# Patient Record
Sex: FEMALE | Race: WHITE | NOT HISPANIC OR LATINO | Employment: FULL TIME | URBAN - METROPOLITAN AREA
[De-identification: names, ages, dates, MRNs, and addresses within clinical notes are randomized per-mention and may not be internally consistent; named-entity substitution may affect disease eponyms.]

---
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
## 2018-01-29 ENCOUNTER — INITIAL PRENATAL (OUTPATIENT)
Dept: OBSTETRICS AND GYNECOLOGY | Facility: CLINIC | Age: 18
End: 2018-01-29

## 2018-01-29 VITALS — WEIGHT: 153 LBS | SYSTOLIC BLOOD PRESSURE: 107 MMHG | DIASTOLIC BLOOD PRESSURE: 64 MMHG | BODY MASS INDEX: 27.1 KG/M2

## 2018-01-29 DIAGNOSIS — O09.299 HX OF PREECLAMPSIA, PRIOR PREGNANCY, CURRENTLY PREGNANT: Primary | ICD-10-CM

## 2018-01-29 DIAGNOSIS — Z34.90 EARLY STAGE OF PREGNANCY: ICD-10-CM

## 2018-01-29 DIAGNOSIS — Z87.59 HISTORY OF SHOULDER DYSTOCIA IN PRIOR PREGNANCY: ICD-10-CM

## 2018-01-29 PROBLEM — D68.51 HETEROZYGOUS FACTOR V LEIDEN AFFECTING PREGNANCY IN THIRD TRIMESTER, ANTEPARTUM (HCC): Status: RESOLVED | Noted: 2017-05-30 | Resolved: 2018-01-29

## 2018-01-29 PROBLEM — O99.330 TOBACCO USE AFFECTING PREGNANCY, ANTEPARTUM: Status: ACTIVE | Noted: 2018-01-29

## 2018-01-29 PROBLEM — L30.9 DERMATITIS: Status: RESOLVED | Noted: 2017-06-25 | Resolved: 2018-01-29

## 2018-01-29 PROBLEM — O99.113 HETEROZYGOUS FACTOR V LEIDEN AFFECTING PREGNANCY IN THIRD TRIMESTER, ANTEPARTUM (HCC): Status: RESOLVED | Noted: 2017-05-30 | Resolved: 2018-01-29

## 2018-01-29 LAB
ALBUMIN SERPL-MCNC: 4.1 G/DL (ref 3.5–5.2)
ALBUMIN/GLOB SERPL: 1.5 G/DL
ALP SERPL-CCNC: 67 U/L (ref 39–117)
ALT SERPL-CCNC: 19 U/L (ref 1–33)
AST SERPL-CCNC: 14 U/L (ref 1–32)
B-HCG UR QL: POSITIVE
BILIRUB SERPL-MCNC: 0.2 MG/DL (ref 0.1–1.2)
BUN SERPL-MCNC: 5 MG/DL (ref 6–20)
BUN/CREAT SERPL: 10.6 (ref 7–25)
CALCIUM SERPL-MCNC: 9.7 MG/DL (ref 8.6–10.5)
CHLORIDE SERPL-SCNC: 104 MMOL/L (ref 98–107)
CO2 SERPL-SCNC: 20 MMOL/L (ref 22–29)
CREAT SERPL-MCNC: 0.47 MG/DL (ref 0.57–1)
GFR SERPLBLD CREATININE-BSD FMLA CKD-EPI: >150 ML/MIN/1.73
GFR SERPLBLD CREATININE-BSD FMLA CKD-EPI: >150 ML/MIN/1.73
GLOBULIN SER CALC-MCNC: 2.7 GM/DL
GLUCOSE SERPL-MCNC: 92 MG/DL (ref 65–99)
INTERNAL NEGATIVE CONTROL: NEGATIVE
INTERNAL POSITIVE CONTROL: POSITIVE
Lab: ABNORMAL
POTASSIUM SERPL-SCNC: 4 MMOL/L (ref 3.5–5.2)
PROT SERPL-MCNC: 6.8 G/DL (ref 6–8.5)
SODIUM SERPL-SCNC: 139 MMOL/L (ref 136–145)

## 2018-01-29 PROCEDURE — 81025 URINE PREGNANCY TEST: CPT | Performed by: OBSTETRICS & GYNECOLOGY

## 2018-01-29 PROCEDURE — 99214 OFFICE O/P EST MOD 30 MIN: CPT | Performed by: OBSTETRICS & GYNECOLOGY

## 2018-01-29 RX ORDER — PRENATAL WITH FERROUS FUM AND FOLIC ACID 3080; 920; 120; 400; 22; 1.84; 3; 20; 10; 1; 12; 200; 27; 25; 2 [IU]/1; [IU]/1; MG/1; [IU]/1; MG/1; MG/1; MG/1; MG/1; MG/1; MG/1; UG/1; MG/1; MG/1; MG/1; MG/1
1 TABLET ORAL DAILY
Qty: 30 TABLET | Refills: 11 | Status: SHIPPED | OUTPATIENT
Start: 2018-01-29 | End: 2018-02-28

## 2018-01-29 RX ORDER — UREA 10 %
LOTION (ML) TOPICAL
COMMUNITY
Start: 2017-12-28 | End: 2018-02-26

## 2018-01-29 RX ORDER — DULOXETIN HYDROCHLORIDE 30 MG/1
CAPSULE, DELAYED RELEASE ORAL
COMMUNITY
Start: 2018-01-17 | End: 2018-02-26

## 2018-01-29 NOTE — PROGRESS NOTES
CC:  Initial OB visit  Patient presents today for initial OB visit.  Her last delivery was 7 months ago.  Patient was delivered at 38 weeks and was noted to have elevated blood pressures upon presentation to labor and delivery.  She was not diagnosed with preeclampsia during labor and was discharged home on postpartum day #2 on no medications.  Patient then re-presented one day later with pulmonary edema.  It was felt that this was related to postpartum preeclampsia.  An echocardiogram was normal and she was ruled out for postpartum cardiomyopathy.  Patient has a history of factor V Leiden mutation but has no history of a personal blood clot and is heterozygous for the gene.  Her last delivery was also complicated by a shoulder dystocia.  In terms of postpartum preeclampsia, discussed recommendations to start 81 mg of aspirin daily.  Will also obtain baseline labs and a baseline 24-hour urine protein.  Blood pressure today is normal.  As far as a previous history of shoulder dystocia, explained to patient that she is at increased risk for a recurrent shoulder dystocia with each pregnancy.  Discussed with her risks and benefits of trial of labor versus primary  section.  Patient states that she desires a primary  with this delivery.  Patient was not on any anticoagulation with her last pregnancy and per ACOG recommendations, anticoagulation is not required with heterozygous factor V Leiden mutation and no prior history of clot.  Patient is unsure regarding her LMP and a dating ultrasound has been ordered.  A/P:  Supervision of pregnancy with unknown LMP with h/o preeclampsia and shoulder dystocia  --OB labs, CMP, and 24 hour urine protein today  --Dating u/s ordered  --Start 81 mg ASA daily  --Followup in 4 weeks  Counseling was given to patient for the following topics: instructions for management, risk factor reductions, prognosis, patient and family education, risks and benefits of treatment  options and importance of treatment compliance . Total time of the encounter was 25 minutes and 15 minutes was spend counseling.

## 2018-01-30 ENCOUNTER — PROCEDURE VISIT (OUTPATIENT)
Dept: OBSTETRICS AND GYNECOLOGY | Facility: CLINIC | Age: 18
End: 2018-01-30

## 2018-01-30 DIAGNOSIS — Z36.89 ENCOUNTER TO ESTABLISH GESTATIONAL AGE USING ULTRASOUND: Primary | ICD-10-CM

## 2018-01-30 DIAGNOSIS — O99.330 TOBACCO USE AFFECTING PREGNANCY, ANTEPARTUM: ICD-10-CM

## 2018-01-30 DIAGNOSIS — D68.51 FACTOR 5 LEIDEN MUTATION, HETEROZYGOUS (HCC): ICD-10-CM

## 2018-01-30 LAB
ABO GROUP BLD: (no result)
BASOPHILS # BLD AUTO: 0 X10E3/UL (ref 0–0.2)
BASOPHILS NFR BLD AUTO: 0 %
BLD GP AB SCN SERPL QL: NEGATIVE
EOSINOPHIL # BLD AUTO: 0.2 X10E3/UL (ref 0–0.4)
EOSINOPHIL NFR BLD AUTO: 3 %
ERYTHROCYTE [DISTWIDTH] IN BLOOD BY AUTOMATED COUNT: 14.6 % (ref 12.3–15.4)
HBV SURFACE AG SERPL QL IA: NEGATIVE
HCT VFR BLD AUTO: 37.3 % (ref 34–46.6)
HGB BLD-MCNC: 12.5 G/DL (ref 11.1–15.9)
HIV 1+2 AB+HIV1 P24 AG SERPL QL IA: NON REACTIVE
IMM GRANULOCYTES # BLD: 0 X10E3/UL (ref 0–0.1)
IMM GRANULOCYTES NFR BLD: 0 %
LYMPHOCYTES # BLD AUTO: 1.7 X10E3/UL (ref 0.7–3.1)
LYMPHOCYTES NFR BLD AUTO: 27 %
MCH RBC QN AUTO: 27.5 PG (ref 26.6–33)
MCHC RBC AUTO-ENTMCNC: 33.5 G/DL (ref 31.5–35.7)
MCV RBC AUTO: 82 FL (ref 79–97)
MONOCYTES # BLD AUTO: 0.5 X10E3/UL (ref 0.1–0.9)
MONOCYTES NFR BLD AUTO: 7 %
NEUTROPHILS # BLD AUTO: 4.1 X10E3/UL (ref 1.4–7)
NEUTROPHILS NFR BLD AUTO: 63 %
PLATELET # BLD AUTO: 243 X10E3/UL (ref 150–379)
RBC # BLD AUTO: 4.54 X10E6/UL (ref 3.77–5.28)
RH BLD: POSITIVE
RPR SER QL: NON REACTIVE
RUBV IGG SERPL IA-ACNC: 1.34 INDEX
WBC # BLD AUTO: 6.5 X10E3/UL (ref 3.4–10.8)

## 2018-01-30 PROCEDURE — 76817 TRANSVAGINAL US OBSTETRIC: CPT | Performed by: OBSTETRICS & GYNECOLOGY

## 2018-01-31 LAB
BACTERIA UR CULT: NORMAL
BACTERIA UR CULT: NORMAL
C TRACH RRNA SPEC QL NAA+PROBE: NEGATIVE
N GONORRHOEA RRNA SPEC QL NAA+PROBE: NEGATIVE
T VAGINALIS RRNA SPEC QL NAA+PROBE: NEGATIVE

## 2018-02-26 ENCOUNTER — ROUTINE PRENATAL (OUTPATIENT)
Dept: OBSTETRICS AND GYNECOLOGY | Facility: CLINIC | Age: 18
End: 2018-02-26

## 2018-02-26 VITALS — SYSTOLIC BLOOD PRESSURE: 127 MMHG | DIASTOLIC BLOOD PRESSURE: 73 MMHG | WEIGHT: 149.2 LBS | BODY MASS INDEX: 26.43 KG/M2

## 2018-02-26 DIAGNOSIS — O26.892 HEADACHE IN PREGNANCY, ANTEPARTUM, SECOND TRIMESTER: Primary | ICD-10-CM

## 2018-02-26 DIAGNOSIS — Z3A.14 14 WEEKS GESTATION OF PREGNANCY: ICD-10-CM

## 2018-02-26 DIAGNOSIS — R51.9 HEADACHE IN PREGNANCY, ANTEPARTUM, SECOND TRIMESTER: Primary | ICD-10-CM

## 2018-02-26 PROCEDURE — 99213 OFFICE O/P EST LOW 20 MIN: CPT | Performed by: OBSTETRICS & GYNECOLOGY

## 2018-02-26 RX ORDER — MAGNESIUM OXIDE 400 MG/1
400 TABLET ORAL DAILY
Qty: 30 TABLET | Refills: 0 | Status: SHIPPED | OUTPATIENT
Start: 2018-02-26 | End: 2018-05-17

## 2018-03-05 LAB
2ND TRIMESTER 4 SCREEN SERPL-IMP: NORMAL
2ND TRIMESTER 4 SCREEN SERPL-IMP: NORMAL
AFP ADJ MOM SERPL: NORMAL
AFP SERPL-MCNC: 24.2 NG/ML
AGE AT DELIVERY: 20 YEARS
FET TS 18 RISK FROM MAT AGE: NORMAL
FET TS 21 RISK FROM MAT AGE: 1163
GA METHOD: NORMAL
GA: 14.1 WEEKS
HCG ADJ MOM SERPL: NORMAL
HCG SERPL-ACNC: NORMAL MIU/ML
IDDM PATIENT QL: NO
INHIBIN A ADJ MOM SERPL: NORMAL
INHIBIN A SERPL-MCNC: 134.46 PG/ML
LABORATORY COMMENT REPORT: NORMAL
MULTIPLE PREGNANCY: NORMAL
NEURAL TUBE DEFECT RISK FETUS: NORMAL %
RESULT: NORMAL
TS 18 RISK FETUS: NORMAL
TS 21 RISK FETUS: NORMAL
U ESTRIOL ADJ MOM SERPL: NORMAL
U ESTRIOL SERPL-MCNC: 0.4 NG/ML

## 2018-03-07 ENCOUNTER — TELEPHONE (OUTPATIENT)
Dept: OBSTETRICS AND GYNECOLOGY | Facility: CLINIC | Age: 18
End: 2018-03-07

## 2018-03-07 NOTE — TELEPHONE ENCOUNTER
----- Message from Radha Noel MD sent at 3/7/2018  4:34 PM EST -----  Let patient know that she had her blood draw for the genetic screen a little too early but she may now come back and have it repeated at any time or we can repeat it at her next visit.

## 2018-03-13 ENCOUNTER — TELEPHONE (OUTPATIENT)
Dept: OBSTETRICS AND GYNECOLOGY | Facility: CLINIC | Age: 18
End: 2018-03-13

## 2018-03-13 NOTE — TELEPHONE ENCOUNTER
Rx sent    ----- Message from Elisa Pena sent at 3/13/2018 10:31 AM EDT -----  Ms Major believes she has an yeast infection and is requesting RX, please advise.

## 2018-03-26 ENCOUNTER — ROUTINE PRENATAL (OUTPATIENT)
Dept: OBSTETRICS AND GYNECOLOGY | Facility: CLINIC | Age: 18
End: 2018-03-26

## 2018-03-26 VITALS — DIASTOLIC BLOOD PRESSURE: 70 MMHG | BODY MASS INDEX: 20.41 KG/M2 | WEIGHT: 115.2 LBS | SYSTOLIC BLOOD PRESSURE: 121 MMHG

## 2018-03-26 DIAGNOSIS — O99.330 TOBACCO USE AFFECTING PREGNANCY, ANTEPARTUM: Primary | ICD-10-CM

## 2018-03-26 DIAGNOSIS — Z3A.18 18 WEEKS GESTATION OF PREGNANCY: ICD-10-CM

## 2018-03-26 PROCEDURE — 99213 OFFICE O/P EST LOW 20 MIN: CPT | Performed by: OBSTETRICS & GYNECOLOGY
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
## 2018-03-28 LAB
2ND TRIMESTER 4 SCREEN SERPL-IMP: NORMAL
2ND TRIMESTER 4 SCREEN SERPL-IMP: NORMAL
AFP ADJ MOM SERPL: 1.19
AFP SERPL-MCNC: 50.7 NG/ML
AGE AT DELIVERY: 20 YEARS
FET TS 18 RISK FROM MAT AGE: NORMAL
FET TS 21 RISK FROM MAT AGE: 1163
GA METHOD: NORMAL
GA: 18.1 WEEKS
HCG ADJ MOM SERPL: 0.86
HCG SERPL-ACNC: NORMAL MIU/ML
IDDM PATIENT QL: NO
INHIBIN A ADJ MOM SERPL: 0.89
INHIBIN A SERPL-MCNC: 147.25 PG/ML
LABORATORY COMMENT REPORT: NORMAL
MULTIPLE PREGNANCY: NORMAL
NEURAL TUBE DEFECT RISK FETUS: 6704 %
RESULT: NORMAL
TS 18 RISK FETUS: NORMAL
TS 21 RISK FETUS: NORMAL
U ESTRIOL ADJ MOM SERPL: 1.68
U ESTRIOL SERPL-MCNC: 2.14 NG/ML

## 2018-03-29 ENCOUNTER — TELEPHONE (OUTPATIENT)
Dept: OBSTETRICS AND GYNECOLOGY | Facility: CLINIC | Age: 18
End: 2018-03-29

## 2018-03-29 NOTE — TELEPHONE ENCOUNTER
----- Message from Radha Noel MD sent at 3/28/2018 10:57 AM EDT -----  Let patient know her genetic screening results were normal

## 2018-04-09 ENCOUNTER — PROCEDURE VISIT (OUTPATIENT)
Dept: OBSTETRICS AND GYNECOLOGY | Facility: CLINIC | Age: 18
End: 2018-04-09

## 2018-04-09 DIAGNOSIS — Z36.89 ENCOUNTER FOR FETAL ANATOMIC SURVEY: Primary | ICD-10-CM

## 2018-04-09 PROCEDURE — 76805 OB US >/= 14 WKS SNGL FETUS: CPT | Performed by: OBSTETRICS & GYNECOLOGY

## 2018-04-23 ENCOUNTER — ROUTINE PRENATAL (OUTPATIENT)
Dept: OBSTETRICS AND GYNECOLOGY | Facility: CLINIC | Age: 18
End: 2018-04-23

## 2018-04-23 VITALS — SYSTOLIC BLOOD PRESSURE: 114 MMHG | WEIGHT: 163 LBS | BODY MASS INDEX: 28.87 KG/M2 | DIASTOLIC BLOOD PRESSURE: 63 MMHG

## 2018-04-23 DIAGNOSIS — O09.299 HX OF PREECLAMPSIA, PRIOR PREGNANCY, CURRENTLY PREGNANT: ICD-10-CM

## 2018-04-23 DIAGNOSIS — Z3A.22 22 WEEKS GESTATION OF PREGNANCY: Primary | ICD-10-CM

## 2018-04-23 PROCEDURE — 99213 OFFICE O/P EST LOW 20 MIN: CPT | Performed by: OBSTETRICS & GYNECOLOGY

## 2018-04-23 NOTE — PROGRESS NOTES
CC:  Pregnancy  Pt feels well. No problems or concerns.  Patient requests refill on aspirin.  Refill sent.  Reviewed anatomy u/s with patient and ultrasound was normal.  Discussed 1 hr gtt at next visit and instructions given.  A/P:  Supervision of pregnancy at 22 weeks with h/o preE  --Refill sent for ASA 81 mg  --F/U in 4 weeks with 1 hr gtt at next visit  Counseling was given to patient for the following topics: diagnostic results, instructions for management and patient and family education . Total time of the encounter was 15 minutes and 10 minutes was spend counseling.

## 2018-05-02 ENCOUNTER — TELEPHONE (OUTPATIENT)
Dept: OBSTETRICS AND GYNECOLOGY | Facility: CLINIC | Age: 18
End: 2018-05-02

## 2018-05-02 NOTE — TELEPHONE ENCOUNTER
She currently has no medical indication related to pregnancy to be taken off of work.  This pregnancy has so far had no issues.  Leg and abdominal pain can be a normal part of pregnancy and we can discuss at her next visit.    ----- Message from Elisa Pena sent at 5/2/2018 12:13 PM EDT -----  Ms Major states that she been having leg and stomach pain due to work, pt is wanting to know if she needed to be taken off work due to her be high risk.

## 2018-05-10 ENCOUNTER — TELEPHONE (OUTPATIENT)
Dept: OBSTETRICS AND GYNECOLOGY | Facility: CLINIC | Age: 18
End: 2018-05-10

## 2018-05-10 NOTE — TELEPHONE ENCOUNTER
Tell her that I have reviewed them and everything from the ED was normal.  She also needs to only go to [facility] ED while she is pregnant.    ----- Message from Elisa Pena sent at 5/10/2018  2:18 PM EDT -----  Pt is requesting for you review ER visit on 5/8/2018, scanned in chart.

## 2018-05-17 ENCOUNTER — HOSPITAL ENCOUNTER (EMERGENCY)
Facility: HOSPITAL | Age: 18
Discharge: PSYCHIATRIC OR SUBSTANCE USE FACILITY | End: 2018-05-17
Attending: EMERGENCY MEDICINE

## 2018-05-17 ENCOUNTER — HOSPITAL ENCOUNTER (INPATIENT)
Facility: HOSPITAL | Age: 18
LOS: 2 days | Discharge: HOME | End: 2018-05-19
Attending: SPECIALIST | Admitting: SPECIALIST

## 2018-05-17 VITALS
WEIGHT: 163 LBS | HEIGHT: 63 IN | TEMPERATURE: 98.3 F | BODY MASS INDEX: 28.88 KG/M2 | RESPIRATION RATE: 16 BRPM | SYSTOLIC BLOOD PRESSURE: 114 MMHG | DIASTOLIC BLOOD PRESSURE: 64 MMHG | HEART RATE: 98 BPM | OXYGEN SATURATION: 98 %

## 2018-05-17 DIAGNOSIS — N39.0 ACUTE UTI: ICD-10-CM

## 2018-05-17 DIAGNOSIS — Z3A.25 PREGNANCY WITH 25 COMPLETED WEEKS GESTATION: ICD-10-CM

## 2018-05-17 DIAGNOSIS — F60.3 BORDERLINE PERSONALITY DISORDER (HCC): ICD-10-CM

## 2018-05-17 DIAGNOSIS — F33.9 DEPRESSION, RECURRENT (HCC): Primary | ICD-10-CM

## 2018-05-17 PROBLEM — F33.2 SEVERE RECURRENT MAJOR DEPRESSION WITHOUT PSYCHOTIC FEATURES: Status: ACTIVE | Noted: 2018-05-17

## 2018-05-17 LAB
AMPHET+METHAMPHET UR QL: POSITIVE
BACTERIA UR QL AUTO: ABNORMAL /HPF
BARBITURATES UR QL SCN: NEGATIVE
BENZODIAZ UR QL SCN: NEGATIVE
BILIRUB UR QL STRIP: NEGATIVE
CANNABINOIDS SERPL QL: NEGATIVE
CHOLEST SERPL-MCNC: 219 MG/DL (ref 0–200)
CLARITY UR: ABNORMAL
COCAINE UR QL: NEGATIVE
COLOR UR: YELLOW
ETHANOL BLD-MCNC: <10 MG/DL (ref 0–10)
ETHANOL UR QL: <0.01 %
GLUCOSE UR STRIP-MCNC: NEGATIVE MG/DL
HDLC SERPL-MCNC: 53 MG/DL (ref 40–60)
HGB UR QL STRIP.AUTO: NEGATIVE
HOLD SPECIMEN: NORMAL
HOLD SPECIMEN: NORMAL
HYALINE CASTS UR QL AUTO: ABNORMAL /LPF
KETONES UR QL STRIP: ABNORMAL
LDLC SERPL CALC-MCNC: 110 MG/DL (ref 0–100)
LDLC/HDLC SERPL: 2.08 {RATIO}
LEUKOCYTE ESTERASE UR QL STRIP.AUTO: ABNORMAL
METHADONE UR QL SCN: NEGATIVE
MUCOUS THREADS URNS QL MICRO: ABNORMAL /HPF
NITRITE UR QL STRIP: NEGATIVE
OPIATES UR QL: NEGATIVE
OXYCODONE UR QL SCN: NEGATIVE
PH UR STRIP.AUTO: 6 [PH] (ref 5–8)
PROT UR QL STRIP: ABNORMAL
RBC # UR: ABNORMAL /HPF
REF LAB TEST METHOD: ABNORMAL
SP GR UR STRIP: >=1.03 (ref 1–1.03)
SQUAMOUS #/AREA URNS HPF: ABNORMAL /HPF
T4 FREE SERPL-MCNC: 1.15 NG/DL (ref 0.93–1.7)
TRIGL SERPL-MCNC: 278 MG/DL (ref 0–150)
TSH SERPL DL<=0.05 MIU/L-ACNC: 1.88 MIU/ML (ref 0.27–4.2)
UROBILINOGEN UR QL STRIP: ABNORMAL
VLDLC SERPL-MCNC: 55.6 MG/DL (ref 5–40)
WBC UR QL AUTO: ABNORMAL /HPF
WHOLE BLOOD HOLD SPECIMEN: NORMAL
WHOLE BLOOD HOLD SPECIMEN: NORMAL

## 2018-05-17 PROCEDURE — 80307 DRUG TEST PRSMV CHEM ANLYZR: CPT | Performed by: NURSE PRACTITIONER

## 2018-05-17 PROCEDURE — 81001 URINALYSIS AUTO W/SCOPE: CPT | Performed by: NURSE PRACTITIONER

## 2018-05-17 PROCEDURE — 84439 ASSAY OF FREE THYROXINE: CPT | Performed by: SPECIALIST

## 2018-05-17 PROCEDURE — 84443 ASSAY THYROID STIM HORMONE: CPT | Performed by: SPECIALIST

## 2018-05-17 PROCEDURE — 80061 LIPID PANEL: CPT | Performed by: SPECIALIST

## 2018-05-17 PROCEDURE — 90791 PSYCH DIAGNOSTIC EVALUATION: CPT | Performed by: SOCIAL WORKER

## 2018-05-17 RX ORDER — CEPHALEXIN 500 MG/1
500 CAPSULE ORAL ONCE
Status: COMPLETED | OUTPATIENT
Start: 2018-05-17 | End: 2018-05-17

## 2018-05-17 RX ORDER — ALUMINA, MAGNESIA, AND SIMETHICONE 2400; 2400; 240 MG/30ML; MG/30ML; MG/30ML
15 SUSPENSION ORAL EVERY 6 HOURS PRN
Status: DISCONTINUED | OUTPATIENT
Start: 2018-05-17 | End: 2018-05-19 | Stop reason: HOSPADM

## 2018-05-17 RX ORDER — CEPHALEXIN 500 MG/1
500 CAPSULE ORAL 2 TIMES DAILY
Qty: 14 CAPSULE | Refills: 0 | Status: SHIPPED | OUTPATIENT
Start: 2018-05-17 | End: 2018-05-19 | Stop reason: HOSPADM

## 2018-05-17 RX ORDER — ACETAMINOPHEN 325 MG/1
650 TABLET ORAL EVERY 4 HOURS PRN
Status: DISCONTINUED | OUTPATIENT
Start: 2018-05-17 | End: 2018-05-19 | Stop reason: HOSPADM

## 2018-05-17 RX ADMIN — CEPHALEXIN 500 MG: 500 CAPSULE ORAL at 19:38
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
## 2018-05-17 NOTE — ED NOTES
Suicide precautions in place. Garage door down in the pts room (exam room #41). This tech is currently sitting with the patient for patient safety. Awaiting evaluation by an ER Provider.      Josseline Springer  05/17/18 9514

## 2018-05-17 NOTE — ED NOTES
Pt provided with turkey sandwich, potato chips, applesauce, and lucrecia mist after discussing with LINDA Stallworth.     Josseline  Springer  05/17/18 1427

## 2018-05-17 NOTE — ED NOTES
Pt reports she took 3 600mg ibuprofen in an attempt to kill herself. Pt is 25 weeks pregnant. Call placed to L&D charge nurse (Gabbie), she states pt should been seen in ER first.     Yolanda Borrero RN  05/17/18 0759

## 2018-05-17 NOTE — ED PROVIDER NOTES
The patient presents complaining of SI that occurred earlier today. The pt states she took 3 prescription Ibuprofen of an unknown dosage at 1100 at home in the attempts to end her life. The pt states she is overwhelmed by stress because she does not have custody of her daughter. Pt denies SI at this time. The pt is currently 25 weeks pregnant. Pt denies chest pain, shortness of breath, abdominal pain, any pelvic or abdominal trauma, fever, pelvic pain and vaginal bleeding.    Physical Exam:  Patient is nontoxic appearing and in NAD. The pt is alert and oriented X 3. The pt has stable vital signs.  BP- 111/66 HR- 88 Temp- 97.6 °F (36.4 °C) (Oral) O2 sat- 97%    Discussed the plan to have Access evaluate the pt. The pt understands and agrees with the plan.    MD ATTESTATION NOTE    The NICOLA and I have discussed this patient's history, physical exam, and treatment plan.  I have reviewed the documentation and personally had a face to face interaction with the patient. I affirm the documentation and agree with the treatment and plan.  The attached note describes my personal findings.    Documentation assistance provided by jessica Matos for Dr. Duff. Information recorded by the jessica was done at my direction and has been verified and validated by me.                 Mc Matos  05/17/18 1412       Harry Duff MD  05/17/18 2946
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
## 2018-05-17 NOTE — ED NOTES
Per Access center,  Pt to be held in ER till sitter available at 11 pm. Charge JOSE CARLOS Lo notified. No room number assigned in access at this time     Julia Valerio RN  05/17/18 7231

## 2018-05-17 NOTE — ED NOTES
Pt to Ed with c/o SI today, pt is 25 weeks pregnant, saw OB 4 weeks ago for checkup, states everything was normal. Pt denies pain, states took 3 ibuprofen this am attempting to end her life. Pt has hx of SI, lots of stress with home life, problems with , lives with sister, does not have custody of 1 yr old but lives in same household.      Jovanna Rico RN  05/17/18 6999

## 2018-05-17 NOTE — PLAN OF CARE
Problem: Suicidal Behavior (Adult)  Goal: Suicidal Behavior is Absent/Minimized/Managed  Outcome: Ongoing (interventions implemented as appropriate)

## 2018-05-17 NOTE — ED NOTES
Pt provided with turkey sandwich, potato chips, applesauce, and lucrecia mist.      Josseline Springer  05/17/18 8887

## 2018-05-18 ENCOUNTER — DOCUMENTATION (OUTPATIENT)
Dept: LABOR AND DELIVERY | Facility: HOSPITAL | Age: 18
End: 2018-05-18

## 2018-05-18 PROBLEM — Z3A.25 25 WEEKS GESTATION OF PREGNANCY: Status: ACTIVE | Noted: 2018-05-18

## 2018-05-18 LAB
BACTERIA UR QL AUTO: ABNORMAL /HPF
BILIRUB UR QL STRIP: NEGATIVE
CLARITY UR: CLEAR
COLOR UR: YELLOW
GLUCOSE UR STRIP-MCNC: NEGATIVE MG/DL
HGB UR QL STRIP.AUTO: NEGATIVE
HYALINE CASTS UR QL AUTO: ABNORMAL /LPF
KETONES UR QL STRIP: NEGATIVE
LEUKOCYTE ESTERASE UR QL STRIP.AUTO: ABNORMAL
NITRITE UR QL STRIP: NEGATIVE
PH UR STRIP.AUTO: 6.5 [PH] (ref 5–8)
PROT UR QL STRIP: NEGATIVE
RBC # UR: ABNORMAL /HPF
REF LAB TEST METHOD: ABNORMAL
SP GR UR STRIP: 1.02 (ref 1–1.03)
SQUAMOUS #/AREA URNS HPF: ABNORMAL /HPF
UROBILINOGEN UR QL STRIP: ABNORMAL
WBC UR QL AUTO: ABNORMAL /HPF

## 2018-05-18 PROCEDURE — 59025 FETAL NON-STRESS TEST: CPT | Performed by: OBSTETRICS & GYNECOLOGY

## 2018-05-18 PROCEDURE — 99251 PR INITL INPATIENT CONSULT NEW/ESTAB PT 20 MIN: CPT | Performed by: OBSTETRICS & GYNECOLOGY

## 2018-05-18 PROCEDURE — 59025 FETAL NON-STRESS TEST: CPT

## 2018-05-18 PROCEDURE — 81001 URINALYSIS AUTO W/SCOPE: CPT | Performed by: SPECIALIST

## 2018-05-18 RX ORDER — ASPIRIN 81 MG/1
81 TABLET, CHEWABLE ORAL DAILY
Status: DISCONTINUED | OUTPATIENT
Start: 2018-05-18 | End: 2018-05-19 | Stop reason: HOSPADM

## 2018-05-18 RX ORDER — CEPHALEXIN 500 MG/1
500 CAPSULE ORAL EVERY 12 HOURS SCHEDULED
Status: DISCONTINUED | OUTPATIENT
Start: 2018-05-18 | End: 2018-05-19 | Stop reason: HOSPADM

## 2018-05-18 RX ORDER — PRENATAL VIT NO.126/IRON/FOLIC 28MG-0.8MG
1 TABLET ORAL DAILY
Status: DISCONTINUED | OUTPATIENT
Start: 2018-05-18 | End: 2018-05-19 | Stop reason: HOSPADM

## 2018-05-18 RX ADMIN — CEPHALEXIN 500 MG: 500 CAPSULE ORAL at 22:23

## 2018-05-18 RX ADMIN — Medication 1 TABLET: at 12:28

## 2018-05-18 RX ADMIN — ASPIRIN 81 MG: 81 TABLET, CHEWABLE ORAL at 10:44

## 2018-05-18 RX ADMIN — CEPHALEXIN 500 MG: 500 CAPSULE ORAL at 10:44

## 2018-05-18 NOTE — PLAN OF CARE
Problem: Patient Care Overview  Goal: Individualization and Mutuality  Outcome: Ongoing (interventions implemented as appropriate)   05/18/18 1016   Personal Strengths/Vulnerabilities   Patient Personal Strengths independent living skills;resourceful     Goal: Interprofessional Rounds/Family Conf  Outcome: Ongoing (interventions implemented as appropriate)   05/18/18 1016   Interdisciplinary Rounds/Family Conf   Participants ;pharmacy;nursing;psychiatrist;social work   Interdisciplinary Rounds/Family Conf   Summary Treatment team met to discuss plan of care. Plan for LOIDA consult and family session.  to assess for additional discharge needs.     Patient/Guardian Signature: __________________________________            Psychiatrist Signature: ______________________________________             Therapist Signature: ________________________________________         Nurse Signature: ___________________________________________          Staff Signature: ____________________________________________            Staff Signature: ____________________________________________          Staff Signature: ____________________________________________          Staff Signature:                                                                                                      Problem: Suicidal Behavior (Adult)  Goal: Suicidal Behavior is Absent/Minimized/Managed  Outcome: Ongoing (interventions implemented as appropriate)   05/17/18 1954 05/18/18 0315   OTHER   Action Step/Short Term Goal (STG) Established 05/17/18 --    Suicidal Behavior is Absent/Minimized/Managed   Suicidal Behavior Managed/Minimized Time Frame for Action Step (STG) --  3 days   Suicidal Behavior Managed/Minimized Action Step (STG) Outcome --  making progress toward outcome

## 2018-05-18 NOTE — NON STRESS TEST
Sweetie Major, a  at 25w5d with an JAMES of 2018, by Last Menstrual Period, was seen at [facility] for a nonstress test.    Chief Complaint: Suicidal ideation    Patient monitored for NST on CMU from [id number] on 18. Fetal Tracing reactive for gestational age. Tracing is on paper in Labor and Delivery.

## 2018-05-18 NOTE — PROGRESS NOTES
LOIDA Consult: Met with pt 1:1 to assess for any LOIDA needs due to pt's positive UDS for methamphetamine.  Pt states she does not drink any alcohol or use any illegal substances.  She reports using aspirin occasionally for headaches, Vicks vapor rub when congested, and she smoke menthol cigarettes.  Pt also reports she used marijuana in high school but has not used in recent years.  Pt declined need for any LOIDA resources at this time.

## 2018-05-18 NOTE — PROGRESS NOTES
Continued Stay Note   [facility]     Patient Name: Sweetie Major  MRN: [id number]  Today's Date: 5/18/2018    Admit Date: 5/17/2018          Discharge Plan     Row Name 05/18/18 1205       Plan    Plan Pt will return home.  Pt will receive a family session and LOIDA consult.  SW will explore outpt providers for continuity of care.    Patient/Family in Agreement with Plan yes    Plan Comments SW met w/pt to discuss treatment and discharge planning.  Pt was able to verify demographic info.  Pt stated that she currently has providers at [facility]; pt sees Griselda, Arnol and LINDA White.  Pt stated that she completed parenting classes online which was ordered by CPS.                Discharge Codes    No documentation.           MIGUEL Carreon
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
## 2018-05-18 NOTE — CONSULTS
OB Consult Note    SUBJECTIVE:   Sweetie Major is a 17 y.o.  at 25w5d admitted to psychiatry for suicide attempt that occurred 18 in the evening.  After seeing her daughter virginia, she took three 600mg Ibuprofen.  She reports that she is feeling much better now.  Reports normal fetal movement, no contractions or vaginal bleeding.  She was previously on Celexa.      Her prenatal care has been with Dr. Wolfe.  It is complicated by factor V Leiden heterozygous mutation with no personal history of a blood clot.  Her last delivery was in 2017 and complicated by a shoulder dystocia.    Past Medical History:   Diagnosis Date   • Anxiety    • Bipolar disorder    • Depression     meds prior to pregnancy   • Factor V Leiden    • Preeclampsia    • Trichimoniasis    • Urinary tract infection    • Urogenital trichomoniasis      Past Surgical History:   Procedure Laterality Date   • ADENOIDECTOMY     • COLONOSCOPY     • EYE SURGERY     • TONSILLECTOMY       Social History   Substance Use Topics   • Smoking status: Current Every Day Smoker     Packs/day: 0.50     Types: Cigarettes     Last attempt to quit: 10/21/2016   • Smokeless tobacco: Never Used   • Alcohol use No     Family History   Problem Relation Age of Onset   • Diabetes Mother    • Hypertension Mother    • Cervical cancer Sister          Allergies   Allergen Reactions   • Hydrocodone Anaphylaxis       Constitutional: negative for chills, fevers  Eyes: negative  Ears, nose, mouth, throat, and face: negative for hearing loss and nasal congestion  Respiratory: negative for asthma and wheezing  Cardiovascular: negative for chest pain and dyspnea  Gastrointestinal: negative for dyspepsia, dysphagia abdominal pain  Genitourinary:negative for urinary incontinence  Integument/breast: negative for breast lump  Hematologic/lymphatic: negative for bleeding  Musculoskeletal:negative for aches  Neurological: negative for numbness/tingling  Behavioral/Psych:  negative for anhedonia  Allergic/Immunologic: negative for rash, allergy          OBJECTIVE:   Vitals:    18 2317 18 2326 18 0620 18 0626   BP:  105/64 (!) 85/49 101/62   BP Location:  Left arm Left arm Right arm   Patient Position:  Lying Lying Sitting   Pulse:  90 96    Resp:  16 16    Temp:  97.8 °F (36.6 °C) 98.5 °F (36.9 °C)    TempSrc:  Oral Oral    SpO2:  97% 97%    Weight: 73.9 kg (163 lb)         Physical Examination: General appearance - alert, well appearing, and in no distress  Mental status - alert, oriented to person, place, and time  Chest - no tachypnea, retractions or cyanosis  Abdomen - soft, gravid, nontender  Pelvic - examination not indicated  Neurological - alert, oriented, normal speech, no focal findings or movement disorder noted  Musculoskeletal - no joint tenderness, deformity or swelling  Extremities - no pedal edema noted  Skin - normal coloration and turgor, no rashes, no suspicious skin lesions noted    ASSESSMENT:   17 y.o.  at 25w5d with history of bipolar disorder and depression, admitted for suicidal ideation and recent suicide attempt.    PLAN:   IUP at 25w5d:   PNV daily   Prenatal care up to date    Counseled patient that while the overall do not recommend use of nonsteroidal anti-inflammatories and pregnancy their use is sometimes implemented in the second trimester.  I doubt that the ingestion of a one time dose had any fetal implications.    Fetal status    NST daily    Factor V Leiden heterozygote:   Continue ASA, encourage mobilization    Recent suicide attempt:   Management per primary team   Patient does have h/o bipolar disorder, depression and anxiety noted in chart. Patient was previously on Celexa.  This medication has some data in pregnancy.  Overall, it is unlikely that it will cause birth defects especially at this gestational age if it needs to be restarted.  Some babies taking SSRIs may exhibit withdrawal-like symptoms such as  jitteriness, increased muscle tone, breathing problems.  There have been some studies suggesting that babies exposed to SSRIs may have a small increased chance for persistent pulmonary hypertension of the  however other studies have contradicted this.  As with all medications in pregnancy one must weigh the risks and benefits of the medication and the disease.   If pharmacotherapy is indicated, we are happy to discuss specific medication and their use in pregnancy.    Thank you for this consult.      Brigid Smith MD

## 2018-05-18 NOTE — ED NOTES
Called lab, who states that they can add urine culture to earlier urine specimen.     Autumn R Gian  05/17/18 2009 Autumn R Gian  05/17/18 2009

## 2018-05-18 NOTE — ED NOTES
Access center called, once sitter available, pt to go to room # 3320-2     Julia Valerio RN  05/17/18 7942

## 2018-05-18 NOTE — PLAN OF CARE
Problem: Patient Care Overview  Goal: Plan of Care Review  Outcome: Ongoing (interventions implemented as appropriate)   05/18/18 0315   OTHER   Outcome Summary Pt arrived on the unit at approximately 2315 this shift. Pt rated anxiety 3/10; depression 4/10. Pt denied SI, HI, and hallucinations. No suicidal gestures noted. Pt able to contract for safety. Sitter remains at pt's bedside r/t safety concerns. Pt appeared to rest well througout the night with no issues noted. Will continue to monitor.    Coping/Psychosocial   Plan of Care Reviewed With patient   Coping/Psychosocial   Patient Agreement with Plan of Care agrees   Plan of Care Review   Progress no change       Problem: Overarching Goals (Adult)  Goal: Adheres to Safety Considerations for Self and Others  Outcome: Ongoing (interventions implemented as appropriate)   05/18/18 0315   Overarching Goals (Adult)   Adheres to Safety Considerations for Self and Others making progress toward outcome     Goal: Optimized Coping Skills in Response to Life Stressors  Outcome: Ongoing (interventions implemented as appropriate)   05/18/18 0315   Overarching Goals (Adult)   Optimized Coping Skills in Response to Life Stressors making progress toward outcome     Goal: Develops/Participates in Therapeutic [place] to Support Successful Transition  Outcome: Ongoing (interventions implemented as appropriate)   05/18/18 0315   Overarching Goals (Adult)   Develops/Participates in Therapeutic [place] to Support Successful Transition making progress toward outcome       Problem: Suicidal Behavior (Adult)  Goal: Suicidal Behavior is Absent/Minimized/Managed  Outcome: Ongoing (interventions implemented as appropriate)   05/18/18 0315   Suicidal Behavior is Absent/Minimized/Managed   Suicidal Behavior Managed/Minimized Time Frame for Action Step (STG) 3 days   Suicidal Behavior Managed/Minimized Action Step (STG) Outcome making progress toward outcome

## 2018-05-18 NOTE — PLAN OF CARE
Problem: Patient Care Overview  Goal: Discharge Needs Assessment  Outcome: Ongoing (interventions implemented as appropriate)   05/18/18 1203 05/18/18 1208   Discharge Needs Assessment   Concerns to be Addressed substance/tobacco abuse/use --    Discharge Coordination/Progress --  Pt will return home. Pt will receive a family session and LOIDA consult. SW will explore outpt providers for continuity of care.
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
## 2018-05-18 NOTE — PLAN OF CARE
"Problem: Patient Care Overview  Goal: Plan of Care Review  Outcome: Ongoing (interventions implemented as appropriate)   05/18/18 1507   OTHER   Outcome Summary Patient has been pleasent and cooperative with medications and programming. She stated feeling \"good\" with no complaints. She denied anxiety, depression, SI/HI and hallucinations. OB/GYN is following patient as she is 26 weeks pregnant and will have a daily fetal nonstress test per L&D staff. Patient denies any pregnancy complications. Will continue to monitor and provide support.    Coping/Psychosocial   Plan of Care Reviewed With patient   Coping/Psychosocial   Patient Agreement with Plan of Care agrees   Plan of Care Review   Progress improving     Goal: Individualization and Mutuality  Outcome: Ongoing (interventions implemented as appropriate)    Goal: Discharge Needs Assessment  Outcome: Ongoing (interventions implemented as appropriate)    Goal: Interprofessional Rounds/Family Conf  Outcome: Ongoing (interventions implemented as appropriate)      Problem: Overarching Goals (Adult)  Goal: Adheres to Safety Considerations for Self and Others  Outcome: Ongoing (interventions implemented as appropriate)   05/18/18 1507   Overarching Goals (Adult)   Adheres to Safety Considerations for Self and Others making progress toward outcome     Intervention: Develop and Maintain Individualized Safety Plan   05/17/18 2345 05/18/18 0900   C-SSRS (Recent)   Wish to be Dead --  no  (contracts to safety)   Suicidal Thoughts --  no   Suicidal Thought with Method No Plan/Intent --  no   Suicidal Intent (without Specific Plan) --  no   Suicide Intent with Specific Plan --  no   Describe Plan (Suicide Intent) --  no   Suicide Behavior --  no   Describe Actions (Suicidal Behavior) within the last three months --    Violence Risk   Feels Like Hurting Others --  no  (contracts to safety )   Previous Attempt to Harm Others no --    Develop and Maintain Individualized Safety " Plan   Safety Measures --  safety rounds completed;suicide assessment completed       Goal: Optimized Coping Skills in Response to Life Stressors  Outcome: Ongoing (interventions implemented as appropriate)   05/18/18 1507   Overarching Goals (Adult)   Optimized Coping Skills in Response to Life Stressors making progress toward outcome     Intervention: Promote Effective Coping Strategies   05/18/18 0900   Coping/Psychosocial Interventions   Supportive Measures positive reinforcement provided;active listening utilized;verbalization of feelings encouraged       Goal: Develops/Participates in Therapeutic Parachute to Support Successful Transition  Outcome: Ongoing (interventions implemented as appropriate)   05/18/18 0315   Overarching Goals (Adult)   Develops/Participates in Therapeutic Parachute to Support Successful Transition making progress toward outcome     Intervention: Foster Therapeutic Parachute   05/18/18 0900   Interventions   Trust Relationship/Rapport care explained;choices provided;emotional support provided;empathic listening provided;questions answered;questions encouraged;reassurance provided;thoughts/feelings acknowledged     Intervention: Mutually Develop Transition Plan   05/18/18 0900   Mutually Develop Transition Plan   Transition Support crisis management plan promoted         Problem: Suicidal Behavior (Adult)  Intervention: Facilitate Resolution of Suicidal Intent   05/18/18 1507   Facilitate Resolution of Suicidal Intent   Mutually Determined Action Steps (Facilitate Resolution of Suicidal Intent) sets future-oriented goal     Intervention: Provide Immediate/Ongoing Protective Physical Environment   05/18/18 1507   Provide Immediate/Ongoing Protective Physical Environment   Mutually Determined Action Steps (Provide Immediate/Ongoing Protective Physical Environment) shares suicidal thoughts       Goal: Suicidal Behavior is Absent/Minimized/Managed  Outcome: Ongoing (interventions implemented as  appropriate)   05/17/18 1954 05/18/18 0315   OTHER   Action Step/Short Term Goal (STG) Established 05/17/18 --    Suicidal Behavior is Absent/Minimized/Managed   Suicidal Behavior Managed/Minimized Time Frame for Action Step (STG) --  3 days   Suicidal Behavior Managed/Minimized Action Step (STG) Outcome --  making progress toward outcome

## 2018-05-19 VITALS
WEIGHT: 163 LBS | OXYGEN SATURATION: 98 % | BODY MASS INDEX: 28.87 KG/M2 | HEART RATE: 80 BPM | DIASTOLIC BLOOD PRESSURE: 57 MMHG | SYSTOLIC BLOOD PRESSURE: 94 MMHG | TEMPERATURE: 98.1 F | RESPIRATION RATE: 16 BRPM

## 2018-05-19 PROCEDURE — 99231 SBSQ HOSP IP/OBS SF/LOW 25: CPT | Performed by: OBSTETRICS & GYNECOLOGY

## 2018-05-19 RX ORDER — PRENATAL VIT NO.126/IRON/FOLIC 28MG-0.8MG
1 TABLET ORAL DAILY
Qty: 30 TABLET | Refills: 1 | Status: SHIPPED | OUTPATIENT
Start: 2018-05-20 | End: 2019-01-02

## 2018-05-19 RX ORDER — CEPHALEXIN 500 MG/1
500 CAPSULE ORAL EVERY 12 HOURS SCHEDULED
Qty: 11 CAPSULE | Refills: 0 | Status: SHIPPED | OUTPATIENT
Start: 2018-05-19 | End: 2018-05-25

## 2018-05-19 RX ADMIN — ASPIRIN 81 MG: 81 TABLET, CHEWABLE ORAL at 08:34

## 2018-05-19 RX ADMIN — Medication 1 TABLET: at 08:34

## 2018-05-19 RX ADMIN — CEPHALEXIN 500 MG: 500 CAPSULE ORAL at 08:34

## 2018-05-19 NOTE — DISCHARGE SUMMARY
DATES OF ADMISSION: 5/17/2018-5 20,018    REASON FOR ADMISSION: The patient is a 17-year-old white female admitted to the crisis management unit with increasing suicidal ideation and an ingestion of ibuprofen.    LABS:  UDS positive for amphetamines    HOSPITAL COURSE:  Patient was admitted to the CMU and placed on suicide precautions.  These were discontinued at the patient's able to promise safety in the hospital.  She was not started on any antidepressant medicine given her pregnancy status was started on prenatal vitamins.  The patient had a positive family therapy session on 5/20/2018 and discharge was ordered on that date.  The patient denied abuse of amphetamines in spite of its positivity for urine drug screen.    FINAL DIAGNOSIS:  Depressive disorder recurrent moderate, borderline personality traits[, 26 weeks intrauterine pregnancy] feel    DISPOSITION ON DISCHARGE:  A full listing of the patient's medications is provided below.  Follow-up will take place to the auspices of community mental health resources.    PROGNOSIS: Sweetie Sandy   Home Medication Instructions JANNA:[id number]    Printed on:05/19/18 9244   Medication Information                      aspirin 81 MG tablet  Take 1 tablet by mouth Daily.             cephalexin (KEFLEX) 500 MG capsule  Take 1 capsule by mouth Every 12 (Twelve) Hours for 11 doses.             Prenatal Vit-Fe Fumarate-FA (PRENATAL, CLASSIC, VITAMIN) 28-0.8 MG tablet tablet  Take 1 tablet by mouth Daily.

## 2018-05-19 NOTE — PLAN OF CARE
Problem: Patient Care Overview  Goal: Plan of Care Review  Outcome: Ongoing (interventions implemented as appropriate)   05/18/18 2256 05/19/18 6039   OTHER   Outcome Summary --  Pt has been pleasant and cooperative with mediciation. Pt denies anxiety, rates depression 2/10, denies pain, SI, HI and hallucinations. Pt is 26 weeks pregnant and denies any complications. Will continue to monitor.   Coping/Psychosocial   Plan of Care Reviewed With patient --    Coping/Psychosocial   Patient Agreement with Plan of Care agrees --    Plan of Care Review   Progress --  improving       Problem: Overarching Goals (Adult)  Goal: Adheres to Safety Considerations for Self and Others  Outcome: Ongoing (interventions implemented as appropriate)    Goal: Optimized Coping Skills in Response to Life Stressors  Outcome: Ongoing (interventions implemented as appropriate)    Goal: Develops/Participates in Therapeutic [place] to Support Successful Transition  Outcome: Ongoing (interventions implemented as appropriate)      Problem: Suicidal Behavior (Adult)  Goal: Suicidal Behavior is Absent/Minimized/Managed  Outcome: Ongoing (interventions implemented as appropriate)

## 2018-05-19 NOTE — PROGRESS NOTES
Patient was about to enter therapy session, so I did not directly see the patient today.  I spoke with patient's nurse states the patient is doing well.  I feel at this point time that most of her need for hospitalization is related to her psychiatric history.  I feel that from an obstetrical standpoint we have little to add at this time.  I do not feel that she needs to continue daily nonstress test as she does not have any medical problem that we would expect because placental insufficiency.  If she were at home at this time we would not be performing daily nonstress test.  I will discontinue these at this point.  We will sign off at this time.  If you have any questions in the future, please do not hesitate to contact us or reconsult.  If the primary team thinks that the initiation of medication such as antidepressant medications are necessary, they may certainly call us for advice regarding safety of specific medications in pregnancy, although, this was well addressed by Dr. Smith's consult note from yesterday.

## 2018-05-19 NOTE — SIGNIFICANT NOTE
Met with pt and mother for family session [id number]. Mother and pt shared of escalation of anger which lead to suicidal thoughts. Mother shared boundaries which pt will need to follow while living at sister's home. These include to do whatever sister and  say, continue to do chores and go to therapy. Mother and pt shared looking forward to going to therapy more often and that CPS worker Nicole is setting pt up with therapy at [facility]. Pt shared was going to [facility] and that ws only once a month and feels needs therapy more often. Mother shared of own anger and need to set limits with pt's behavior. This therapist encouraged pt to focus therapy on anger issues, relationship issues affected by anger and coping skills to stop escalation of anger. Pt eventually agreed to this. Mother shared willing to take pt home and sister is willing to give pt a second chance, but mother also shared would follow doctor's recommendation. Pt denied current thoughts of suicide.

## 2018-06-01 ENCOUNTER — ROUTINE PRENATAL (OUTPATIENT)
Dept: OBSTETRICS AND GYNECOLOGY | Facility: CLINIC | Age: 18
End: 2018-06-01

## 2018-06-01 VITALS — BODY MASS INDEX: 29.23 KG/M2 | WEIGHT: 165 LBS | SYSTOLIC BLOOD PRESSURE: 116 MMHG | DIASTOLIC BLOOD PRESSURE: 80 MMHG

## 2018-06-01 DIAGNOSIS — F33.2 SEVERE RECURRENT MAJOR DEPRESSION WITHOUT PSYCHOTIC FEATURES (HCC): Primary | ICD-10-CM

## 2018-06-01 DIAGNOSIS — Z3A.27 27 WEEKS GESTATION OF PREGNANCY: ICD-10-CM

## 2018-06-01 DIAGNOSIS — Z3A.27 27 WEEKS GESTATION OF PREGNANCY: Primary | ICD-10-CM

## 2018-06-01 LAB — GLUCOSE 1H P 50 G GLC PO SERPL-MCNC: 90 MG/DL (ref 65–139)

## 2018-06-01 PROCEDURE — 99213 OFFICE O/P EST LOW 20 MIN: CPT | Performed by: OBSTETRICS & GYNECOLOGY

## 2018-06-01 RX ORDER — CITALOPRAM 20 MG/1
20 TABLET ORAL DAILY
Qty: 30 TABLET | Refills: 2 | Status: SHIPPED | OUTPATIENT
Start: 2018-06-01 | End: 2018-08-25 | Stop reason: HOSPADM

## 2018-06-01 NOTE — PROGRESS NOTES
CC:  Pregnancy  No problems or concerns.  Patient was admitted 2 weeks ago to the hospital after a suicide attempt.  Patient states that the incident was situational and she denies any thoughts of harm to herself or others since discharge.  Patient has a history of depression.  She is currently seeing a counselor at [facility] in [place].  Patient had an appointment set up at [facility] but states that she missed her appointment and cannot follow-up there until August.  She was not discharged on any depression medication.  She was counseled on risks and benefits of medication.  She's been on Celexa in the past and tolerated it well.  Patient would like to restart Celexa.  A prescription was sent to her pharmacy.  Patient reports good fetal movement.  She is doing her 1 hour glucose test today.  A/P:  Supervision of pregnancy at 27 weeks with depression  --F/U in 2 weeks  --1 hr gtt today  --Started on celexa  Counseling was given to patient for the following topics: instructions for management and patient and family education . Total time of the encounter was 15 minutes and 10 minutes was spend counseling.

## 2018-06-04 ENCOUNTER — TELEPHONE (OUTPATIENT)
Dept: OBSTETRICS AND GYNECOLOGY | Facility: CLINIC | Age: 18
End: 2018-06-04

## 2018-06-05 ENCOUNTER — TELEPHONE (OUTPATIENT)
Dept: OBSTETRICS AND GYNECOLOGY | Facility: CLINIC | Age: 18
End: 2018-06-05

## 2018-06-05 ENCOUNTER — HOSPITAL ENCOUNTER (OUTPATIENT)
Facility: HOSPITAL | Age: 18
Setting detail: OBSERVATION
Discharge: HOME | End: 2018-06-05
Attending: OBSTETRICS & GYNECOLOGY | Admitting: OBSTETRICS & GYNECOLOGY

## 2018-06-05 VITALS
HEART RATE: 78 BPM | TEMPERATURE: 98 F | DIASTOLIC BLOOD PRESSURE: 56 MMHG | SYSTOLIC BLOOD PRESSURE: 109 MMHG | WEIGHT: 175 LBS | BODY MASS INDEX: 31.01 KG/M2 | HEIGHT: 63 IN

## 2018-06-05 PROBLEM — Z34.90 PREGNANCY: Status: ACTIVE | Noted: 2018-06-05

## 2018-06-05 LAB
BILIRUB UR QL STRIP: NEGATIVE
CLARITY UR: CLEAR
COLOR UR: YELLOW
FIBRONECTIN FETAL VAG QL: NEGATIVE
GLUCOSE UR STRIP-MCNC: NEGATIVE MG/DL
HGB UR QL STRIP.AUTO: NEGATIVE
KETONES UR QL STRIP: NEGATIVE
LEUKOCYTE ESTERASE UR QL STRIP.AUTO: NEGATIVE
NITRITE UR QL STRIP: NEGATIVE
PH UR STRIP.AUTO: 7 [PH] (ref 5–8)
PROT UR QL STRIP: NEGATIVE
SP GR UR STRIP: 1.02 (ref 1–1.03)
UROBILINOGEN UR QL STRIP: NORMAL

## 2018-06-05 PROCEDURE — G0378 HOSPITAL OBSERVATION PER HR: HCPCS

## 2018-06-05 PROCEDURE — 81003 URINALYSIS AUTO W/O SCOPE: CPT | Performed by: OBSTETRICS & GYNECOLOGY

## 2018-06-05 PROCEDURE — 59025 FETAL NON-STRESS TEST: CPT | Performed by: OBSTETRICS & GYNECOLOGY

## 2018-06-05 PROCEDURE — 59025 FETAL NON-STRESS TEST: CPT

## 2018-06-05 PROCEDURE — 82731 ASSAY OF FETAL FIBRONECTIN: CPT | Performed by: OBSTETRICS & GYNECOLOGY

## 2018-06-05 NOTE — TELEPHONE ENCOUNTER
Pt has been having a lot of lower back pain, contractions & cramping that started about 3 hours ago. Nothing helps it but walking makes it worse. # [phone]     I called the [place] office and had Ana relay this message to Dr. Noel. Dr. Noel told her to have me instruct the patient to go to [facility] D to be checked for  labor & stress to the patient to go to [facility], not another hospital. I instructed the patient to go to L & D at [facility] and to not go to another hospital per Dr. Noel's instructions. Pt agreed.

## 2018-06-05 NOTE — NON STRESS TEST
Sweetie Major, a  at 28w2d with an JAMES of 2018, by Last Menstrual Period, was seen at [facility] for a nonstress test.    Chief Complaint   Patient presents with   • Back Pain     pelvic pressure       Interpretation A  Nonstress Test Interpretation A: Reactive (18 1600 : Charley Tovar RN)  Comments A: 28 weeks (18 1600 : Charley Tovar RN)        Reason for test: OB Triage  Date of Test: 2018  Time frame of test: 7928-7747  RN NST Interpretation: Reactive

## 2018-06-18 ENCOUNTER — ROUTINE PRENATAL (OUTPATIENT)
Dept: OBSTETRICS AND GYNECOLOGY | Facility: CLINIC | Age: 18
End: 2018-06-18

## 2018-06-18 VITALS — DIASTOLIC BLOOD PRESSURE: 67 MMHG | WEIGHT: 182.8 LBS | BODY MASS INDEX: 32.38 KG/M2 | SYSTOLIC BLOOD PRESSURE: 106 MMHG

## 2018-06-18 DIAGNOSIS — Z3A.30 30 WEEKS GESTATION OF PREGNANCY: ICD-10-CM

## 2018-06-18 DIAGNOSIS — O09.299 HX OF PREECLAMPSIA, PRIOR PREGNANCY, CURRENTLY PREGNANT: Primary | ICD-10-CM

## 2018-06-18 PROCEDURE — 99213 OFFICE O/P EST LOW 20 MIN: CPT | Performed by: OBSTETRICS & GYNECOLOGY

## 2018-06-18 NOTE — PROGRESS NOTES
CC:  Pregnancy  Patient complains today of her pelvic bone popping when she is shifting positions during sleep at night.  Reassurance given to the patient.  Discussed comfort measures during sleep.  Discussed recommendations for tdap and patient would like to wait to get the vaccine at another visit.  Patient was started on Celexa at her previous visit and states that this has helped with her moods.  Patient reports good fetal movement.  A/P:  Supervision of pregnancy at 30 weeks with h/o preeclampsia  --F/U in 2 weeks with growth u/s  Counseling was given to patient for the following topics: instructions for management and patient and family education . Total time of the encounter was 15 minutes and 10 minutes was spend counseling.

## 2018-07-11 ENCOUNTER — PROCEDURE VISIT (OUTPATIENT)
Dept: OBSTETRICS AND GYNECOLOGY | Facility: CLINIC | Age: 18
End: 2018-07-11

## 2018-07-11 ENCOUNTER — ROUTINE PRENATAL (OUTPATIENT)
Dept: OBSTETRICS AND GYNECOLOGY | Facility: CLINIC | Age: 18
End: 2018-07-11

## 2018-07-11 VITALS — DIASTOLIC BLOOD PRESSURE: 73 MMHG | SYSTOLIC BLOOD PRESSURE: 124 MMHG | WEIGHT: 191.8 LBS | BODY MASS INDEX: 33.98 KG/M2

## 2018-07-11 DIAGNOSIS — Z36.89 ENCOUNTER FOR ULTRASOUND TO CHECK FETAL GROWTH: Primary | ICD-10-CM

## 2018-07-11 DIAGNOSIS — O09.299 HX OF PREECLAMPSIA, PRIOR PREGNANCY, CURRENTLY PREGNANT: Primary | ICD-10-CM

## 2018-07-11 DIAGNOSIS — O09.299 HX OF PREECLAMPSIA, PRIOR PREGNANCY, CURRENTLY PREGNANT: ICD-10-CM

## 2018-07-11 DIAGNOSIS — Z87.59 HISTORY OF SHOULDER DYSTOCIA IN PRIOR PREGNANCY: ICD-10-CM

## 2018-07-11 DIAGNOSIS — Z3A.33 33 WEEKS GESTATION OF PREGNANCY: ICD-10-CM

## 2018-07-11 PROCEDURE — 76816 OB US FOLLOW-UP PER FETUS: CPT | Performed by: OBSTETRICS & GYNECOLOGY

## 2018-07-11 PROCEDURE — 99213 OFFICE O/P EST LOW 20 MIN: CPT | Performed by: OBSTETRICS & GYNECOLOGY

## 2018-07-11 NOTE — PROGRESS NOTES
CC:  Pregnancy  Patient complains of pelvic pressure and low back pain.  Discussed conservative management options to help with discomfort.  Patient reports good fetal movement.  Growth ultrasound was reviewed today and shows appropriate interval growth.  Patient desires a primary  section due to history of shoulder dystocia.  Will schedule for .  Patient was given information today on  section.  Discussed recommendations for tdap and she desires.  A/P:  Supervision of pregnancy at 33 weeks with h/o shoulder dystocia  --Will schedule primary c section  --Tdap given today  --F/U in 2 weeks  Counseling was given to patient for the following topics: diagnostic results, instructions for management and patient and family education . Total time of the encounter was 15 minutes and 10 minutes was spend counseling.

## 2018-07-11 NOTE — PATIENT INSTRUCTIONS
Delivery   birth, or  delivery, is the surgical delivery of a baby through an incision in the abdomen and the uterus. This may be referred to as a . This procedure may be scheduled ahead of time, or it may be done in an emergency situation.  Tell a health care provider about:  · Any allergies you have.  · All medicines you are taking, including vitamins, herbs, eye drops, creams, and over-the-counter medicines.  · Any problems you or family members have had with anesthetic medicines.  · Any blood disorders you have.  · Any surgeries you have had.  · Any medical conditions you have.  · Whether you or any members of your family have a history of deep vein thrombosis (DVT) or pulmonary embolism (PE).  What are the risks?  Generally, this is a safe procedure. However, problems may occur, including:  · Infection.  · Bleeding.  · Allergic reactions to medicines.  · Damage to other structures or organs.  · Blood clots.  · Injury to your baby.    What happens before the procedure?  · Follow instructions from your health care provider about eating or drinking restrictions.  · Follow instructions from your health care provider about bathing before your procedure to help reduce your risk of infection.  · If you know that you are going to have a  delivery, do not shave your pubic area. Shaving before the procedure may increase your risk of infection.  · Ask your health care provider about:  ? Changing or stopping your regular medicines. This is especially important if you are taking diabetes medicines or blood thinners.  ? Your pain management plan. This is especially important if you plan to breastfeed your baby.  ? How long you will be in the hospital after the procedure.  ? Any concerns you may have about receiving blood products if you need them during the procedure.  ? Cord blood banking, if you plan to collect your baby’s umbilical cord blood.  · You may also want to ask your  health care provider:  ? Whether you will be able to hold or breastfeed your baby while you are still in the operating room.  ? Whether your baby can stay with you immediately after the procedure and during your recovery.  ? Whether a family member or a person of your choice can go with you into the operating room and stay with you during the procedure, immediately after the procedure, and during your recovery.  · Plan to have someone drive you home when you are discharged from the hospital.  What happens during the procedure?  · Fetal monitors will be placed on your abdomen to monitor your heart rate and your baby's heart rate.  · Depending on the reason for your  delivery, you may have a physical exam or additional testing, such as an ultrasound.  · An IV tube will be inserted into one of your veins.  · You may have your blood or urine tested.  · You will be given antibiotic medicine to help prevent infection.  · You may be given a special warming gown to wear to keep your temperature stable.  · Hair may be removed from your pubic area.  · The skin of your pubic area and lower abdomen will be cleaned with a germ-killing solution (antiseptic).  · A catheter may be inserted into your bladder through your urethra. This drains your urine during the procedure.  · You may be given one or more of the following:  ? A medicine to numb the area (local anesthetic).  ? A medicine to make you fall asleep (general anesthetic).  ? A medicine (regional anesthetic) that is injected into your back or through a small thin tube placed in your back (spinal anesthetic or epidural anesthetic). This numbs everything below the injection site and allows you to stay awake during your procedure. If this makes you feel nauseous, tell your health care provider. Medicines will be available to help reduce any nausea you may feel.  · An incision will be made in your abdomen, and then in your uterus.  · If you are awake during your  procedure, you may feel tugging and pulling in your abdomen, but you should not feel pain. If you feel pain, tell your health care provider immediately.  · Your baby will be removed from your uterus. You may feel more pressure or pushing while this happens.  · Immediately after birth, your baby will be dried and kept warm. You may be able to hold and breastfeed your baby. The umbilical cord may be clamped and cut during this time.  · Your placenta will be removed from your uterus.  · Your incisions will be closed with stitches (sutures). Staples, skin glue, or adhesive strips may also be applied to the incision in your abdomen.  · Bandages (dressings) will be placed over the incision in your abdomen.  The procedure may vary among health care providers and hospitals.  What happens after the procedure?  · Your blood pressure, heart rate, breathing rate, and blood oxygen level will be monitored often until the medicines you were given have worn off.  · You may continue to receive fluids and medicines through an IV tube.  · You will have some pain. Medicines will be available to help control your pain.  · To help prevent blood clots:  ? You may be given medicines.  ? You may have to wear compression stockings or devices.  ? You will be encouraged to walk around when you are able.  · Hospital staff will encourage and support bonding with your baby. Your hospital may allow you and your baby to stay in the same room (rooming in) during your hospital stay to encourage successful breastfeeding.  · You may be encouraged to cough and breathe deeply often. This helps to prevent lung problems.  · If you have a catheter draining your urine, it will be removed as soon as possible after your procedure.  This information is not intended to replace advice given to you by your health care provider. Make sure you discuss any questions you have with your health care provider.  Document Released: 12/18/2006 Document Revised: 05/25/2017  Document Reviewed: 09/27/2016  [employer] Patient Education © 2018 [employer]

## 2018-07-12 PROCEDURE — 90715 TDAP VACCINE 7 YRS/> IM: CPT | Performed by: OBSTETRICS & GYNECOLOGY

## 2018-07-12 PROCEDURE — 90471 IMMUNIZATION ADMIN: CPT | Performed by: OBSTETRICS & GYNECOLOGY

## 2018-07-16 ENCOUNTER — HOSPITAL ENCOUNTER (OUTPATIENT)
Facility: HOSPITAL | Age: 18
Setting detail: OBSERVATION
Discharge: HOME | End: 2018-07-16
Attending: OBSTETRICS & GYNECOLOGY | Admitting: OBSTETRICS & GYNECOLOGY

## 2018-07-16 ENCOUNTER — TELEPHONE (OUTPATIENT)
Dept: OBSTETRICS AND GYNECOLOGY | Facility: CLINIC | Age: 18
End: 2018-07-16

## 2018-07-16 VITALS
DIASTOLIC BLOOD PRESSURE: 71 MMHG | RESPIRATION RATE: 18 BRPM | SYSTOLIC BLOOD PRESSURE: 111 MMHG | BODY MASS INDEX: 34.02 KG/M2 | WEIGHT: 192 LBS | TEMPERATURE: 98.3 F | HEIGHT: 63 IN | HEART RATE: 108 BPM

## 2018-07-16 PROBLEM — Z34.90 PREGNANCY: Status: RESOLVED | Noted: 2018-06-05 | Resolved: 2018-07-16

## 2018-07-16 PROBLEM — O23.43 URINARY TRACT INFECTION IN MOTHER DURING THIRD TRIMESTER OF PREGNANCY: Status: ACTIVE | Noted: 2018-07-16

## 2018-07-16 LAB
A1 MICROGLOB PLACENTAL VAG QL: NEGATIVE
BACTERIA UR QL AUTO: ABNORMAL /HPF
BILIRUB UR QL STRIP: NEGATIVE
CLARITY UR: ABNORMAL
COLOR UR: YELLOW
GLUCOSE UR STRIP-MCNC: NEGATIVE MG/DL
HGB UR QL STRIP.AUTO: NEGATIVE
HYALINE CASTS UR QL AUTO: ABNORMAL /LPF
KETONES UR QL STRIP: NEGATIVE
LEUKOCYTE ESTERASE UR QL STRIP.AUTO: ABNORMAL
NITRITE UR QL STRIP: NEGATIVE
PH UR STRIP.AUTO: 7 [PH] (ref 5–8)
PROT UR QL STRIP: NEGATIVE
RBC # UR: ABNORMAL /HPF
REF LAB TEST METHOD: ABNORMAL
SP GR UR STRIP: 1.02 (ref 1–1.03)
SQUAMOUS #/AREA URNS HPF: ABNORMAL /HPF
UROBILINOGEN UR QL STRIP: ABNORMAL
WBC UR QL AUTO: ABNORMAL /HPF

## 2018-07-16 PROCEDURE — 87086 URINE CULTURE/COLONY COUNT: CPT | Performed by: OBSTETRICS & GYNECOLOGY

## 2018-07-16 PROCEDURE — G0378 HOSPITAL OBSERVATION PER HR: HCPCS

## 2018-07-16 PROCEDURE — 59025 FETAL NON-STRESS TEST: CPT

## 2018-07-16 PROCEDURE — 84112 EVAL AMNIOTIC FLUID PROTEIN: CPT | Performed by: OBSTETRICS & GYNECOLOGY

## 2018-07-16 PROCEDURE — 81001 URINALYSIS AUTO W/SCOPE: CPT | Performed by: OBSTETRICS & GYNECOLOGY

## 2018-07-16 PROCEDURE — 59025 FETAL NON-STRESS TEST: CPT | Performed by: OBSTETRICS & GYNECOLOGY

## 2018-07-16 PROCEDURE — 99214 OFFICE O/P EST MOD 30 MIN: CPT | Performed by: OBSTETRICS & GYNECOLOGY

## 2018-07-16 RX ORDER — NITROFURANTOIN 25; 75 MG/1; MG/1
100 CAPSULE ORAL EVERY 12 HOURS SCHEDULED
Qty: 14 CAPSULE | Refills: 0 | Status: SHIPPED | OUTPATIENT
Start: 2018-07-16 | End: 2018-07-23

## 2018-07-16 NOTE — NON STRESS TEST
Sweetie Major, a  at 34w1d with an JAMES of 2018, by Last Menstrual Period, was seen at [facility] for a nonstress test.    Chief Complaint   Patient presents with   • Abdominal Pain     pain in left side started abouat 2 pm today, states that she has had contractions since 28 weeks and has some leakage of fluid after voiding       Patient Active Problem List   Diagnosis   • Factor 5 Leiden mutation, heterozygous (CMS/HCC)   • Hx of preeclampsia, prior pregnancy, currently pregnant   • History of shoulder dystocia in prior pregnancy   • Tobacco use affecting pregnancy, antepartum   • Tobacco abuse   • Pelvic pain in female   • Mood disorder (CMS/HCC)   • Factor V Leiden mutation (CMS/HCC)   • Depression   • Anxiety   • 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

## 2018-07-16 NOTE — TELEPHONE ENCOUNTER
She can try increasing hydration or a warm bath to see if this helps but if she is having more than 4 painful contractions in an hour, she should go to L&D.  She is at the point in pregnancy where she is going to have irregular contractions and these can be painful, but they should not be regular.    Pt states that she has been having contractions that are very intense but not close together. She states that they take her breath away. She also said it is worse and the pain intensifies if she lays on her left side. Please advise.

## 2018-07-16 NOTE — PROGRESS NOTES
Chief complaint: Contractions, side pain, leakage of fluid when urinating  History of present illness: The patient presents to labor and delivery today with a number of complaints.  I spoke with the patient, her main complaint was with some mild lower abdominal pains consistent with contractions.  She had also reported a one-time episode of having a sharp pain in her left side when lying on her left side.  That pain happened at home and has since resolved.  She also reports that she has some fluid leaking out when she attempts to use the restroom.  She denies dysuria or hematuria.  She reports normal fetal movement.    Obstetric History       T1      L1     SAB0   TAB0   Ectopic0   Molar0   Multiple0   Live Births1       # Outcome Date GA Lbr Thom/2nd Weight Sex Delivery Anes PTL Lv   2 Current            1 Term 17 38w5d 25:00 / 03:16 3695 g (8 lb 2.3 oz) F Vag-Spont EPI N HUBER      Name: MAXWELL VALEROPAOLA      Complications: Shoulder Dystocia      Apgar1:  5                Apgar5: 8        Past Surgical History:   Procedure Laterality Date   • ADENOIDECTOMY     • COLONOSCOPY     • EYE SURGERY     • TONSILLECTOMY       Past Medical History:   Diagnosis Date   • Anxiety    • Bipolar disorder (CMS/[facility])    • Depression     meds prior to pregnancy   • Factor V Leiden (CMS/[facility])    • Preeclampsia    • Trichimoniasis    • Urinary tract infection    • Urogenital trichomoniasis      Family History   Problem Relation Age of Onset   • Diabetes Mother    • Hypertension Mother    • Cervical cancer Sister      Social History   Substance Use Topics   • Smoking status: Current Every Day Smoker     Packs/day: 0.50     Types: Cigarettes     Last attempt to quit: 10/21/2016   • Smokeless tobacco: Never Used   • Alcohol use No     No current facility-administered medications on file prior to encounter.      Current Outpatient Prescriptions on File Prior to Encounter   Medication Sig   • aspirin 81 MG tablet Take 1  "tablet by mouth Daily.   • citalopram (CELEXA) 20 MG tablet Take 1 tablet by mouth Daily.   • Prenatal Vit-Fe Fumarate-FA (PRENATAL, CLASSIC, VITAMIN) 28-0.8 MG tablet tablet Take 1 tablet by mouth Daily.     Allergies   Allergen Reactions   • Hydrocodone Anaphylaxis     ROS:  General: No fever or chills  Constitutional: No weight loss or gain, no hair loss  HENT: No headache, no hearing loss, no tinnitus  Eyes: normal vision, no eye pain  Lungs: No cough, no shortness of breath  Heart: No chest pain, no palpitations  Abdomen: No nausea, vomiting, constipation or diarrhea  : No dysuria, no hematuria  Skin: No rashes  Lymph: No swelling  Neuro: No parathesia, no weakness  Psych: Normal though content, no hallucinations, no SI/HI    PE:  Vitals:    07/16/18 1725   BP: 111/71   BP Location: Right arm   Patient Position: Sitting   Pulse: 108   Resp: 18   Temp: 98.3 °F (36.8 °C)   TempSrc: Oral   Weight: 87.1 kg (192 lb)   Height: 160 cm (63\")   Gen: No acute distress, awake and oriented times three  Abdomen: soft, nontender, non distended, normoactive bowel sounds, No CVA tenderness  Cervix: Fingertip dilated, 40% effaced, -2 station per the nurse.  No change after one hour.  Psych: Good judgement and insight, normal affect and mood   Neurologic: Cranial nerves II through XII intact, no gross deficits    Lab Results (last 24 hours)     Procedure Component Value Units Date/Time    Urinalysis With Culture If Indicated - Urine, Clean Catch [[id number]]  (Abnormal) Collected:  07/16/18 1804    Specimen:  Urine from Urine, Clean Catch Updated:  07/16/18 1840     Color, UA Yellow     Appearance, UA Turbid (A)     pH, UA 7.0     Specific Gravity, UA 1.017     Glucose, UA Negative     Ketones, UA Negative     Bilirubin, UA Negative     Blood, UA Negative     Protein, UA Negative     Leuk Esterase, UA Moderate (2+) (A)     Nitrite, UA Negative     Urobilinogen, UA 0.2 E.U./dL    Urinalysis, Microscopic Only - Urine, Clean Catch " [[id number]]  (Abnormal) Collected:  18    Specimen:  Urine from Urine, Clean Catch Updated:  18     RBC, UA 0-2 /HPF      WBC, UA 6-12 (A) /HPF      Bacteria, UA 4+ (A) /HPF      Squamous Epithelial Cells, UA 7-12 (A) /HPF      Hyaline Casts, UA None Seen /LPF      Methodology Manual Light Microscopy    Urine Culture - Urine, [[id number]] Collected:  18    Specimen:  Urine from Urine, Clean Catch Updated:  18    PAMG-1, Rapid Assay For ROM - Amniotic Fluid, Amniotic Sac [[id number]]  (Normal) Collected:  18    Specimen:  Amniotic Fluid from Amniotic Sac Updated:  18     Rupture of Membranes Negative          NST: 135/reactive/moderate variability/no decelerations  Tocometry: Irregular contractions    Hospital course: Patient is contractions have improved on the monitor.  Her cervix is made no change in labor and delivery.  Laboratory evaluations for rupture membranes are negative.    Assessment:  1.  17-year-old  2 para 1 at 34 and one sevenths weeks gestational age  2.  Suspect urinary tract infection  3.   contractions without cervical change  4.  History of shoulder dystocia    Plan:  1.  I believe the patient is stable for discharge home.  She has no signs of rupture membranes at this time.  She was having some contractions, which have improved with hydration.  Additionally, her urinalysis in labor and delivery today appears consistent with a urinary tract infection.  We will begin empiric treatment with Macrobid, which I have sent to her pharmacy.  We will follow up urine cultures and adjust therapy as necessary.  She can follow up with her OB/GYN as scheduled.   labor cautions were discussed with the patient.  All questions answered.

## 2018-07-18 LAB — BACTERIA SPEC AEROBE CULT: NO GROWTH

## 2018-07-19 ENCOUNTER — TELEPHONE (OUTPATIENT)
Dept: OBSTETRICS AND GYNECOLOGY | Facility: CLINIC | Age: 18
End: 2018-07-19

## 2018-07-19 NOTE — TELEPHONE ENCOUNTER
----- Message from Radha Noel MD sent at 7/18/2018  9:45 AM EDT -----  Let patient know her urine culture from the hospital was negative for infection and she can stop her antibiotic

## 2018-07-23 ENCOUNTER — ROUTINE PRENATAL (OUTPATIENT)
Dept: OBSTETRICS AND GYNECOLOGY | Facility: CLINIC | Age: 18
End: 2018-07-23

## 2018-07-23 VITALS — SYSTOLIC BLOOD PRESSURE: 116 MMHG | DIASTOLIC BLOOD PRESSURE: 71 MMHG | WEIGHT: 192 LBS | BODY MASS INDEX: 34.01 KG/M2

## 2018-07-23 DIAGNOSIS — Z87.59 HISTORY OF SHOULDER DYSTOCIA IN PRIOR PREGNANCY: Primary | ICD-10-CM

## 2018-07-23 DIAGNOSIS — Z3A.35 35 WEEKS GESTATION OF PREGNANCY: ICD-10-CM

## 2018-07-23 PROBLEM — O23.43 URINARY TRACT INFECTION IN MOTHER DURING THIRD TRIMESTER OF PREGNANCY: Status: RESOLVED | Noted: 2018-07-16 | Resolved: 2018-07-23

## 2018-07-23 PROBLEM — Z3A.25 25 WEEKS GESTATION OF PREGNANCY: Status: RESOLVED | Noted: 2018-05-18 | Resolved: 2018-07-23

## 2018-07-23 PROCEDURE — 99213 OFFICE O/P EST LOW 20 MIN: CPT | Performed by: OBSTETRICS & GYNECOLOGY

## 2018-07-23 NOTE — PROGRESS NOTES
CC:  Pregnancy  No problems or concerns.  Patient reports good fetal movement.  Discussed with patient the date of her primary  along with time.  Preoperative instructions were reviewed with her.  GBS swab was collected today.  A/P:  Supervision of pregnancy at 35 weeks with h/o shoulder dystocia  --Primary c section scheduled on   --GBS collected  --F/U weekly  Counseling was given to patient for the following topics: instructions for management and patient and family education . Total time of the encounter was 15 minutes and 10 minutes was spend counseling.

## 2018-07-27 LAB — B-HEM STREP SPEC QL CULT: NEGATIVE

## 2018-08-01 ENCOUNTER — ROUTINE PRENATAL (OUTPATIENT)
Dept: OBSTETRICS AND GYNECOLOGY | Facility: CLINIC | Age: 18
End: 2018-08-01

## 2018-08-01 VITALS — DIASTOLIC BLOOD PRESSURE: 76 MMHG | WEIGHT: 199 LBS | BODY MASS INDEX: 35.25 KG/M2 | SYSTOLIC BLOOD PRESSURE: 124 MMHG

## 2018-08-01 DIAGNOSIS — Z34.83 ENCOUNTER FOR SUPERVISION OF OTHER NORMAL PREGNANCY IN THIRD TRIMESTER: ICD-10-CM

## 2018-08-01 DIAGNOSIS — M79.606 PREGNANCY RELATED LEG PAIN IN THIRD TRIMESTER, ANTEPARTUM: ICD-10-CM

## 2018-08-01 DIAGNOSIS — Z87.59 HISTORY OF SHOULDER DYSTOCIA IN PRIOR PREGNANCY: Primary | ICD-10-CM

## 2018-08-01 DIAGNOSIS — O26.893 PREGNANCY RELATED LEG PAIN IN THIRD TRIMESTER, ANTEPARTUM: ICD-10-CM

## 2018-08-01 PROCEDURE — 99213 OFFICE O/P EST LOW 20 MIN: CPT | Performed by: OBSTETRICS & GYNECOLOGY

## 2018-08-01 NOTE — PROGRESS NOTES
Ob follow up    Sweetie Hernandez is a 17 y.o.  36w3d patient being seen today for her obstetrical visit. Patient reports leg pain with numbness at night. . Fetal movement: normal.      ROS - Denies leaking fluid, vaginal bleeding and notes good fetal movement.     /76   Wt 90.3 kg (199 lb)   LMP 2017   BMI 35.25 kg/m²     FHT:  156 BPM    Uterine Size: 34 cm   Presentations: cephalic   Pelvic Exam:     Dilation: deferred    Effacement: deferred    Station:  deferred                 Assessment    1) Pregnancy at 36w3d  2) Fetal status reassuring   3) GBS status - negative  4) Prior Shoulder dystocia, for Primary  at 39 weeks   5) Leg pain in pregnancy reviewed.   Does have leiden factor V   Check venous doppler - Tiesha's sign negative     Plan    Labor warnings   [facility] [place]        Kelvin Strong MD   2018  1:21 PM

## 2018-08-03 ENCOUNTER — HOSPITAL ENCOUNTER (OUTPATIENT)
Dept: CARDIOLOGY | Facility: HOSPITAL | Age: 18
Discharge: HOME | End: 2018-08-03
Attending: OBSTETRICS & GYNECOLOGY | Admitting: OBSTETRICS & GYNECOLOGY

## 2018-08-03 LAB
BH CV LOWER VASCULAR LEFT COMMON FEMORAL AUGMENT: NORMAL
BH CV LOWER VASCULAR LEFT COMMON FEMORAL COMPETENT: NORMAL
BH CV LOWER VASCULAR LEFT COMMON FEMORAL COMPRESS: NORMAL
BH CV LOWER VASCULAR LEFT COMMON FEMORAL PHASIC: NORMAL
BH CV LOWER VASCULAR LEFT COMMON FEMORAL SPONT: NORMAL
BH CV LOWER VASCULAR RIGHT COMMON FEMORAL AUGMENT: NORMAL
BH CV LOWER VASCULAR RIGHT COMMON FEMORAL COMPETENT: NORMAL
BH CV LOWER VASCULAR RIGHT COMMON FEMORAL COMPRESS: NORMAL
BH CV LOWER VASCULAR RIGHT COMMON FEMORAL PHASIC: NORMAL
BH CV LOWER VASCULAR RIGHT COMMON FEMORAL SPONT: NORMAL
BH CV LOWER VASCULAR RIGHT DISTAL FEMORAL COMPRESS: NORMAL
BH CV LOWER VASCULAR RIGHT GASTRONEMIUS COMPRESS: NORMAL
BH CV LOWER VASCULAR RIGHT GREATER SAPH AK COMPRESS: NORMAL
BH CV LOWER VASCULAR RIGHT GREATER SAPH BK COMPRESS: NORMAL
BH CV LOWER VASCULAR RIGHT MID FEMORAL AUGMENT: NORMAL
BH CV LOWER VASCULAR RIGHT MID FEMORAL COMPETENT: NORMAL
BH CV LOWER VASCULAR RIGHT MID FEMORAL COMPRESS: NORMAL
BH CV LOWER VASCULAR RIGHT MID FEMORAL PHASIC: NORMAL
BH CV LOWER VASCULAR RIGHT MID FEMORAL SPONT: NORMAL
BH CV LOWER VASCULAR RIGHT PERONEAL COMPRESS: NORMAL
BH CV LOWER VASCULAR RIGHT POPLITEAL AUGMENT: NORMAL
BH CV LOWER VASCULAR RIGHT POPLITEAL COMPETENT: NORMAL
BH CV LOWER VASCULAR RIGHT POPLITEAL COMPRESS: NORMAL
BH CV LOWER VASCULAR RIGHT POPLITEAL PHASIC: NORMAL
BH CV LOWER VASCULAR RIGHT POPLITEAL SPONT: NORMAL
BH CV LOWER VASCULAR RIGHT POSTERIOR TIBIAL COMPRESS: NORMAL
BH CV LOWER VASCULAR RIGHT PROXIMAL FEMORAL COMPRESS: NORMAL
BH CV LOWER VASCULAR RIGHT SAPHENOFEMORAL JUNCTION AUGMENT: NORMAL
BH CV LOWER VASCULAR RIGHT SAPHENOFEMORAL JUNCTION COMPRESS: NORMAL
BH CV LOWER VASCULAR RIGHT SAPHENOFEMORAL JUNCTION PHASIC: NORMAL
BH CV LOWER VASCULAR RIGHT SAPHENOFEMORAL JUNCTION SPONT: NORMAL

## 2018-08-03 PROCEDURE — 93971 EXTREMITY STUDY: CPT

## 2018-08-06 ENCOUNTER — TELEPHONE (OUTPATIENT)
Dept: OBSTETRICS AND GYNECOLOGY | Facility: CLINIC | Age: 18
End: 2018-08-06

## 2018-08-06 ENCOUNTER — ROUTINE PRENATAL (OUTPATIENT)
Dept: OBSTETRICS AND GYNECOLOGY | Facility: CLINIC | Age: 18
End: 2018-08-06

## 2018-08-06 VITALS — DIASTOLIC BLOOD PRESSURE: 73 MMHG | WEIGHT: 201.8 LBS | SYSTOLIC BLOOD PRESSURE: 126 MMHG | BODY MASS INDEX: 35.75 KG/M2

## 2018-08-06 DIAGNOSIS — Z3A.37 37 WEEKS GESTATION OF PREGNANCY: ICD-10-CM

## 2018-08-06 DIAGNOSIS — Z87.59 HISTORY OF SHOULDER DYSTOCIA IN PRIOR PREGNANCY: Primary | ICD-10-CM

## 2018-08-06 PROCEDURE — 99213 OFFICE O/P EST LOW 20 MIN: CPT | Performed by: OBSTETRICS & GYNECOLOGY

## 2018-08-06 NOTE — TELEPHONE ENCOUNTER
----- Message from Kelvin Strong MD sent at 8/6/2018  2:11 PM EDT -----  Yolanda, check for blood clot negative. Please let her know. Thanks Dr. Strong

## 2018-08-06 NOTE — PROGRESS NOTES
CC:  Pregnancy  Pt c/o leaking fluid that has been happening for a few days.  Patient denies any contractions or bleeding.  She reports good fetal movement.  A sterile speculum exam was performed and she has an increase in white vaginal discharge but no pooling of clear fluid.  Fluid is negative nitrazine and negative for ferning.  Explained to patient that she has a normal increase in physiological discharge.  Labor precautions reviewed and advised that she should go to labor and delivery if she feels that her membranes are ruptured.  She is scheduled for primary  in 2 weeks.  A/P:  Supervision of pregnancy at 37 weeks with h/o shoulder dystocia  --Negative for rupture of membranes  --F/U next week  --Primary c/section in 2 weeks  Counseling was given to patient for the following topics: instructions for management and patient and family education . Total time of the encounter was 15 minutes and 10 minutes was spend counseling.

## 2018-08-10 ENCOUNTER — DOCUMENTATION (OUTPATIENT)
Dept: OBSTETRICS AND GYNECOLOGY | Facility: CLINIC | Age: 18
End: 2018-08-10

## 2018-08-11 NOTE — PROGRESS NOTES
Pt calls with complaints of cough and congestion wondering what medications to take. Pt reports symptoms started about two to three days ago. She took a dose of Tylenol yesterday. Does not know if she has a temperature bc she doesn’t have a thermometer. Denies sick contacts. Has a headache for last three days and lower extremity edema. Is worried bc of her history of preeclampsia. Reports normal BP in the office. Has not tried Tylenol t love to help with the headache. I reviewed safe OTC medications to treat symptoms and encouraged her to get a thermometer. If she has a fever, she should come for evaluation. If her headache doesn’t get better with medication or visual changes or RUQ pain arise recommend coming to Ob triage for evaluation. Pt expressed understanding.

## 2018-08-20 DIAGNOSIS — O09.299 H/O SHOULDER DYSTOCIA IN PRIOR PREGNANCY, CURRENTLY PREGNANT: Primary | ICD-10-CM

## 2018-08-20 RX ORDER — SODIUM CHLORIDE, SODIUM LACTATE, POTASSIUM CHLORIDE, CALCIUM CHLORIDE 600; 310; 30; 20 MG/100ML; MG/100ML; MG/100ML; MG/100ML
125 INJECTION, SOLUTION INTRAVENOUS CONTINUOUS
Status: CANCELLED | OUTPATIENT
Start: 2018-08-20

## 2018-08-20 RX ORDER — MISOPROSTOL 100 UG/1
800 TABLET ORAL AS NEEDED
Status: CANCELLED | OUTPATIENT
Start: 2018-08-20

## 2018-08-20 RX ORDER — OXYTOCIN 10 [USP'U]/ML
999 INJECTION, SOLUTION INTRAMUSCULAR; INTRAVENOUS ONCE
Status: CANCELLED | OUTPATIENT
Start: 2018-08-20

## 2018-08-20 RX ORDER — OXYTOCIN 10 [USP'U]/ML
125 INJECTION, SOLUTION INTRAMUSCULAR; INTRAVENOUS CONTINUOUS PRN
Status: CANCELLED | OUTPATIENT
Start: 2018-08-20

## 2018-08-20 RX ORDER — CARBOPROST TROMETHAMINE 250 UG/ML
250 INJECTION, SOLUTION INTRAMUSCULAR AS NEEDED
Status: CANCELLED | OUTPATIENT
Start: 2018-08-20

## 2018-08-20 RX ORDER — METHYLERGONOVINE MALEATE 0.2 MG/ML
200 INJECTION INTRAVENOUS ONCE AS NEEDED
Status: CANCELLED | OUTPATIENT
Start: 2018-08-20

## 2018-08-20 RX ORDER — SODIUM CHLORIDE 0.9 % (FLUSH) 0.9 %
1-10 SYRINGE (ML) INJECTION AS NEEDED
Status: CANCELLED | OUTPATIENT
Start: 2018-08-20

## 2018-08-20 RX ORDER — OXYTOCIN 10 [USP'U]/ML
250 INJECTION, SOLUTION INTRAMUSCULAR; INTRAVENOUS CONTINUOUS
Status: CANCELLED | OUTPATIENT
Start: 2018-08-20 | End: 2018-08-20

## 2018-08-20 RX ORDER — LIDOCAINE HYDROCHLORIDE 10 MG/ML
5 INJECTION, SOLUTION EPIDURAL; INFILTRATION; INTRACAUDAL; PERINEURAL AS NEEDED
Status: CANCELLED | OUTPATIENT
Start: 2018-08-20

## 2018-08-21 ENCOUNTER — ANESTHESIA (OUTPATIENT)
Dept: LABOR AND DELIVERY | Facility: HOSPITAL | Age: 18
End: 2018-08-21

## 2018-08-21 ENCOUNTER — HOSPITAL ENCOUNTER (INPATIENT)
Facility: HOSPITAL | Age: 18
LOS: 4 days | Discharge: HOME | End: 2018-08-25
Attending: OBSTETRICS & GYNECOLOGY | Admitting: OBSTETRICS & GYNECOLOGY

## 2018-08-21 ENCOUNTER — ANESTHESIA EVENT (OUTPATIENT)
Dept: LABOR AND DELIVERY | Facility: HOSPITAL | Age: 18
End: 2018-08-21

## 2018-08-21 DIAGNOSIS — Z98.891 S/P CESAREAN SECTION: Primary | ICD-10-CM

## 2018-08-21 DIAGNOSIS — D68.51 FACTOR 5 LEIDEN MUTATION, HETEROZYGOUS (HCC): ICD-10-CM

## 2018-08-21 DIAGNOSIS — O09.299 H/O SHOULDER DYSTOCIA IN PRIOR PREGNANCY, CURRENTLY PREGNANT: ICD-10-CM

## 2018-08-21 LAB
ABO GROUP BLD: NORMAL
BLD GP AB SCN SERPL QL: NEGATIVE
DEPRECATED RDW RBC AUTO: 47.3 FL (ref 37–54)
ERYTHROCYTE [DISTWIDTH] IN BLOOD BY AUTOMATED COUNT: 15.7 % (ref 11.7–13)
EXPIRATION DATE: NORMAL
HCT VFR BLD AUTO: 31.9 % (ref 35.6–45.5)
HGB BLD-MCNC: 9.6 G/DL (ref 11.9–15.5)
Lab: NORMAL
MCH RBC QN AUTO: 24.9 PG (ref 26.9–32)
MCHC RBC AUTO-ENTMCNC: 30.1 G/DL (ref 32.4–36.3)
MCV RBC AUTO: 82.6 FL (ref 80.5–98.2)
PLATELET # BLD AUTO: 251 10*3/MM3 (ref 140–500)
PMV BLD AUTO: 10.6 FL (ref 6–12)
PROT UR STRIP-MCNC: NEGATIVE MG/DL
RBC # BLD AUTO: 3.86 10*6/MM3 (ref 3.9–5.2)
RH BLD: POSITIVE
T&S EXPIRATION DATE: NORMAL
WBC NRBC COR # BLD: 11.41 10*3/MM3 (ref 4.5–10.7)

## 2018-08-21 PROCEDURE — 25010000003 CEFAZOLIN IN DEXTROSE 2-4 GM/100ML-% SOLUTION: Performed by: OBSTETRICS & GYNECOLOGY

## 2018-08-21 PROCEDURE — 25010000002 MORPHINE PER 10 MG: Performed by: NURSE ANESTHETIST, CERTIFIED REGISTERED

## 2018-08-21 PROCEDURE — 59514 CESAREAN DELIVERY ONLY: CPT | Performed by: OBSTETRICS & GYNECOLOGY

## 2018-08-21 PROCEDURE — 25010000002 HYDROMORPHONE PER 4 MG: Performed by: NURSE ANESTHETIST, CERTIFIED REGISTERED

## 2018-08-21 PROCEDURE — 63710000001 PROMETHAZINE PER 12.5 MG: Performed by: OBSTETRICS & GYNECOLOGY

## 2018-08-21 PROCEDURE — 85027 COMPLETE CBC AUTOMATED: CPT | Performed by: OBSTETRICS & GYNECOLOGY

## 2018-08-21 PROCEDURE — 86901 BLOOD TYPING SEROLOGIC RH(D): CPT | Performed by: OBSTETRICS & GYNECOLOGY

## 2018-08-21 PROCEDURE — 25010000002 FENTANYL CITRATE (PF) 100 MCG/2ML SOLUTION: Performed by: NURSE ANESTHETIST, CERTIFIED REGISTERED

## 2018-08-21 PROCEDURE — 86900 BLOOD TYPING SEROLOGIC ABO: CPT | Performed by: OBSTETRICS & GYNECOLOGY

## 2018-08-21 PROCEDURE — 81002 URINALYSIS NONAUTO W/O SCOPE: CPT | Performed by: OBSTETRICS & GYNECOLOGY

## 2018-08-21 PROCEDURE — 25010000002 PHENYLEPHRINE PER 1 ML: Performed by: NURSE ANESTHETIST, CERTIFIED REGISTERED

## 2018-08-21 PROCEDURE — 86850 RBC ANTIBODY SCREEN: CPT | Performed by: OBSTETRICS & GYNECOLOGY

## 2018-08-21 PROCEDURE — 25010000002 ONDANSETRON PER 1 MG: Performed by: ANESTHESIOLOGY

## 2018-08-21 PROCEDURE — 88307 TISSUE EXAM BY PATHOLOGIST: CPT

## 2018-08-21 PROCEDURE — 90791 PSYCH DIAGNOSTIC EVALUATION: CPT

## 2018-08-21 RX ORDER — SODIUM CHLORIDE 0.9 % (FLUSH) 0.9 %
1-10 SYRINGE (ML) INJECTION AS NEEDED
Status: DISCONTINUED | OUTPATIENT
Start: 2018-08-21 | End: 2018-08-21 | Stop reason: HOSPADM

## 2018-08-21 RX ORDER — MORPHINE SULFATE 1 MG/ML
INJECTION, SOLUTION EPIDURAL; INTRATHECAL; INTRAVENOUS AS NEEDED
Status: DISCONTINUED | OUTPATIENT
Start: 2018-08-21 | End: 2018-08-21 | Stop reason: SURG

## 2018-08-21 RX ORDER — EPHEDRINE SULFATE 50 MG/ML
INJECTION, SOLUTION INTRAVENOUS AS NEEDED
Status: DISCONTINUED | OUTPATIENT
Start: 2018-08-21 | End: 2018-08-21 | Stop reason: SURG

## 2018-08-21 RX ORDER — BUPIVACAINE HYDROCHLORIDE 7.5 MG/ML
INJECTION, SOLUTION EPIDURAL; RETROBULBAR AS NEEDED
Status: DISCONTINUED | OUTPATIENT
Start: 2018-08-21 | End: 2018-08-21 | Stop reason: SURG

## 2018-08-21 RX ORDER — OXYTOCIN-SODIUM CHLORIDE 0.9% IV SOLN 30 UNIT/500ML 30-0.9/5 UT/ML-%
999 SOLUTION INTRAVENOUS ONCE
Status: COMPLETED | OUTPATIENT
Start: 2018-08-21 | End: 2018-08-21

## 2018-08-21 RX ORDER — FENTANYL CITRATE 50 UG/ML
INJECTION, SOLUTION INTRAMUSCULAR; INTRAVENOUS AS NEEDED
Status: DISCONTINUED | OUTPATIENT
Start: 2018-08-21 | End: 2018-08-21 | Stop reason: SURG

## 2018-08-21 RX ORDER — LIDOCAINE HYDROCHLORIDE 10 MG/ML
5 INJECTION, SOLUTION EPIDURAL; INFILTRATION; INTRACAUDAL; PERINEURAL AS NEEDED
Status: DISCONTINUED | OUTPATIENT
Start: 2018-08-21 | End: 2018-08-21 | Stop reason: HOSPADM

## 2018-08-21 RX ORDER — ONDANSETRON 2 MG/ML
4 INJECTION INTRAMUSCULAR; INTRAVENOUS ONCE AS NEEDED
Status: DISCONTINUED | OUTPATIENT
Start: 2018-08-21 | End: 2018-08-25 | Stop reason: HOSPADM

## 2018-08-21 RX ORDER — OXYTOCIN-SODIUM CHLORIDE 0.9% IV SOLN 30 UNIT/500ML 30-0.9/5 UT/ML-%
125 SOLUTION INTRAVENOUS CONTINUOUS PRN
Status: COMPLETED | OUTPATIENT
Start: 2018-08-21 | End: 2018-08-21

## 2018-08-21 RX ORDER — ONDANSETRON 2 MG/ML
4 INJECTION INTRAMUSCULAR; INTRAVENOUS ONCE AS NEEDED
Status: COMPLETED | OUTPATIENT
Start: 2018-08-21 | End: 2018-08-21

## 2018-08-21 RX ORDER — PHYTONADIONE 2 MG/ML
INJECTION, EMULSION INTRAMUSCULAR; INTRAVENOUS; SUBCUTANEOUS
Status: DISPENSED
Start: 2018-08-21 | End: 2018-08-21

## 2018-08-21 RX ORDER — CALCIUM CARBONATE 200(500)MG
1 TABLET,CHEWABLE ORAL EVERY 6 HOURS PRN
Status: DISCONTINUED | OUTPATIENT
Start: 2018-08-21 | End: 2018-08-25 | Stop reason: HOSPADM

## 2018-08-21 RX ORDER — IBUPROFEN 800 MG/1
800 TABLET ORAL EVERY 8 HOURS PRN
Status: DISCONTINUED | OUTPATIENT
Start: 2018-08-21 | End: 2018-08-25 | Stop reason: HOSPADM

## 2018-08-21 RX ORDER — ACETAMINOPHEN 500 MG
1000 TABLET ORAL ONCE
Status: COMPLETED | OUTPATIENT
Start: 2018-08-21 | End: 2018-08-21

## 2018-08-21 RX ORDER — OXYTOCIN-SODIUM CHLORIDE 0.9% IV SOLN 30 UNIT/500ML 30-0.9/5 UT/ML-%
250 SOLUTION INTRAVENOUS CONTINUOUS
Status: DISPENSED | OUTPATIENT
Start: 2018-08-21 | End: 2018-08-21

## 2018-08-21 RX ORDER — FAMOTIDINE 10 MG/ML
20 INJECTION, SOLUTION INTRAVENOUS ONCE AS NEEDED
Status: COMPLETED | OUTPATIENT
Start: 2018-08-21 | End: 2018-08-21

## 2018-08-21 RX ORDER — PRENATAL VIT NO.126/IRON/FOLIC 28MG-0.8MG
1 TABLET ORAL DAILY
Status: DISCONTINUED | OUTPATIENT
Start: 2018-08-21 | End: 2018-08-25 | Stop reason: HOSPADM

## 2018-08-21 RX ORDER — DIPHENHYDRAMINE HCL 25 MG
25 CAPSULE ORAL EVERY 4 HOURS PRN
Status: DISCONTINUED | OUTPATIENT
Start: 2018-08-21 | End: 2018-08-25 | Stop reason: HOSPADM

## 2018-08-21 RX ORDER — ERYTHROMYCIN 5 MG/G
OINTMENT OPHTHALMIC
Status: DISPENSED
Start: 2018-08-21 | End: 2018-08-21

## 2018-08-21 RX ORDER — CEFAZOLIN SODIUM 2 G/100ML
2 INJECTION, SOLUTION INTRAVENOUS ONCE
Status: COMPLETED | OUTPATIENT
Start: 2018-08-21 | End: 2018-08-21

## 2018-08-21 RX ORDER — CITALOPRAM 20 MG/1
20 TABLET ORAL DAILY
Status: DISCONTINUED | OUTPATIENT
Start: 2018-08-21 | End: 2018-08-22

## 2018-08-21 RX ORDER — MORPHINE SULFATE 2 MG/ML
2 INJECTION, SOLUTION INTRAMUSCULAR; INTRAVENOUS
Status: DISCONTINUED | OUTPATIENT
Start: 2018-08-21 | End: 2018-08-25 | Stop reason: HOSPADM

## 2018-08-21 RX ORDER — DOCUSATE SODIUM 100 MG/1
100 CAPSULE, LIQUID FILLED ORAL 2 TIMES DAILY PRN
Status: DISCONTINUED | OUTPATIENT
Start: 2018-08-21 | End: 2018-08-25 | Stop reason: HOSPADM

## 2018-08-21 RX ORDER — METHYLERGONOVINE MALEATE 0.2 MG/ML
200 INJECTION INTRAVENOUS ONCE AS NEEDED
Status: DISCONTINUED | OUTPATIENT
Start: 2018-08-21 | End: 2018-08-21 | Stop reason: HOSPADM

## 2018-08-21 RX ORDER — DIPHENHYDRAMINE HYDROCHLORIDE 50 MG/ML
25 INJECTION INTRAMUSCULAR; INTRAVENOUS EVERY 4 HOURS PRN
Status: DISCONTINUED | OUTPATIENT
Start: 2018-08-21 | End: 2018-08-25 | Stop reason: HOSPADM

## 2018-08-21 RX ORDER — ACETAMINOPHEN 325 MG/1
650 TABLET ORAL EVERY 4 HOURS PRN
Status: DISCONTINUED | OUTPATIENT
Start: 2018-08-21 | End: 2018-08-25 | Stop reason: HOSPADM

## 2018-08-21 RX ORDER — HYDROXYZINE 50 MG/1
50 TABLET, FILM COATED ORAL EVERY 6 HOURS PRN
Status: DISCONTINUED | OUTPATIENT
Start: 2018-08-21 | End: 2018-08-25 | Stop reason: HOSPADM

## 2018-08-21 RX ORDER — OXYCODONE HYDROCHLORIDE AND ACETAMINOPHEN 5; 325 MG/1; MG/1
2 TABLET ORAL EVERY 4 HOURS PRN
Status: DISCONTINUED | OUTPATIENT
Start: 2018-08-21 | End: 2018-08-25 | Stop reason: HOSPADM

## 2018-08-21 RX ORDER — NALOXONE HCL 0.4 MG/ML
0.4 VIAL (ML) INJECTION
Status: ACTIVE | OUTPATIENT
Start: 2018-08-21 | End: 2018-08-22

## 2018-08-21 RX ORDER — CARBOPROST TROMETHAMINE 250 UG/ML
250 INJECTION, SOLUTION INTRAMUSCULAR AS NEEDED
Status: DISCONTINUED | OUTPATIENT
Start: 2018-08-21 | End: 2018-08-21 | Stop reason: HOSPADM

## 2018-08-21 RX ORDER — ONDANSETRON 2 MG/ML
4 INJECTION INTRAMUSCULAR; INTRAVENOUS EVERY 6 HOURS PRN
Status: DISCONTINUED | OUTPATIENT
Start: 2018-08-21 | End: 2018-08-25 | Stop reason: HOSPADM

## 2018-08-21 RX ORDER — PROMETHAZINE HYDROCHLORIDE 12.5 MG/1
12.5 TABLET ORAL EVERY 4 HOURS PRN
Status: DISCONTINUED | OUTPATIENT
Start: 2018-08-21 | End: 2018-08-25 | Stop reason: HOSPADM

## 2018-08-21 RX ORDER — PROMETHAZINE HYDROCHLORIDE 25 MG/ML
12.5 INJECTION, SOLUTION INTRAMUSCULAR; INTRAVENOUS EVERY 4 HOURS PRN
Status: DISCONTINUED | OUTPATIENT
Start: 2018-08-21 | End: 2018-08-25 | Stop reason: HOSPADM

## 2018-08-21 RX ORDER — ZOLPIDEM TARTRATE 5 MG/1
5 TABLET ORAL NIGHTLY PRN
Status: DISCONTINUED | OUTPATIENT
Start: 2018-08-21 | End: 2018-08-25 | Stop reason: HOSPADM

## 2018-08-21 RX ORDER — ONDANSETRON 4 MG/1
4 TABLET, FILM COATED ORAL EVERY 8 HOURS PRN
Status: DISCONTINUED | OUTPATIENT
Start: 2018-08-21 | End: 2018-08-25 | Stop reason: HOSPADM

## 2018-08-21 RX ORDER — SODIUM CHLORIDE, SODIUM LACTATE, POTASSIUM CHLORIDE, CALCIUM CHLORIDE 600; 310; 30; 20 MG/100ML; MG/100ML; MG/100ML; MG/100ML
125 INJECTION, SOLUTION INTRAVENOUS CONTINUOUS
Status: DISCONTINUED | OUTPATIENT
Start: 2018-08-21 | End: 2018-08-21

## 2018-08-21 RX ORDER — MISOPROSTOL 200 UG/1
800 TABLET ORAL AS NEEDED
Status: DISCONTINUED | OUTPATIENT
Start: 2018-08-21 | End: 2018-08-21 | Stop reason: HOSPADM

## 2018-08-21 RX ADMIN — FAMOTIDINE 20 MG: 10 INJECTION INTRAVENOUS at 07:07

## 2018-08-21 RX ADMIN — SODIUM CHLORIDE, POTASSIUM CHLORIDE, SODIUM LACTATE AND CALCIUM CHLORIDE 999 ML/HR: 600; 310; 30; 20 INJECTION, SOLUTION INTRAVENOUS at 05:18

## 2018-08-21 RX ADMIN — OXYTOCIN 999 ML/HR: 10 INJECTION, SOLUTION INTRAMUSCULAR; INTRAVENOUS at 07:55

## 2018-08-21 RX ADMIN — EPHEDRINE SULFATE 10 MG: 50 INJECTION INTRAMUSCULAR; INTRAVENOUS; SUBCUTANEOUS at 08:05

## 2018-08-21 RX ADMIN — MORPHINE SULFATE 0.3 MG: 1 INJECTION EPIDURAL; INTRATHECAL; INTRAVENOUS at 07:34

## 2018-08-21 RX ADMIN — EPHEDRINE SULFATE 5 MG: 50 INJECTION INTRAMUSCULAR; INTRAVENOUS; SUBCUTANEOUS at 07:43

## 2018-08-21 RX ADMIN — SODIUM CHLORIDE, POTASSIUM CHLORIDE, SODIUM LACTATE AND CALCIUM CHLORIDE 125 ML/HR: 600; 310; 30; 20 INJECTION, SOLUTION INTRAVENOUS at 06:27

## 2018-08-21 RX ADMIN — ONDANSETRON 4 MG: 2 INJECTION INTRAMUSCULAR; INTRAVENOUS at 07:06

## 2018-08-21 RX ADMIN — CEFAZOLIN SODIUM 2 G: 2 INJECTION, SOLUTION INTRAVENOUS at 07:26

## 2018-08-21 RX ADMIN — ACETAMINOPHEN 1000 MG: 500 TABLET, FILM COATED ORAL at 07:06

## 2018-08-21 RX ADMIN — PHENYLEPHRINE HYDROCHLORIDE 100 MCG: 10 INJECTION INTRAVENOUS at 07:43

## 2018-08-21 RX ADMIN — OXYTOCIN 125 ML/HR: 10 INJECTION, SOLUTION INTRAMUSCULAR; INTRAVENOUS at 08:55

## 2018-08-21 RX ADMIN — FENTANYL CITRATE 10 MCG: 50 INJECTION INTRAMUSCULAR; INTRAVENOUS at 07:34

## 2018-08-21 RX ADMIN — PHENYLEPHRINE HYDROCHLORIDE 100 MCG: 10 INJECTION INTRAVENOUS at 07:38

## 2018-08-21 RX ADMIN — BUPIVACAINE HYDROCHLORIDE 1.6 ML: 7.5 INJECTION, SOLUTION EPIDURAL; RETROBULBAR at 07:34

## 2018-08-21 RX ADMIN — HYDROMORPHONE HYDROCHLORIDE 0.5 MG: 1 INJECTION, SOLUTION INTRAMUSCULAR; INTRAVENOUS; SUBCUTANEOUS at 09:15

## 2018-08-21 RX ADMIN — PROMETHAZINE HYDROCHLORIDE 12.5 MG: 12.5 TABLET ORAL at 11:16

## 2018-08-21 RX ADMIN — EPHEDRINE SULFATE 5 MG: 50 INJECTION INTRAMUSCULAR; INTRAVENOUS; SUBCUTANEOUS at 07:36

## 2018-08-21 NOTE — NURSING NOTE
I spoke with Dr. Noel at 1557 about conversation with [facility] [[email or web address] Dr. Noel agrees that no one on one sitter is necessary if no current thoughts of harming herself or baby. Access informed me that a note was being placed and for the on call physician to address tomorrow. I passed along to Dr. Noel to read note left by [facility] [[email or web address]].

## 2018-08-21 NOTE — OP NOTE
Section Procedure Note    Indications: Previous shoulder dystocia    Pre-operative Diagnosis: 1. 39 week 2 day pregnancy.  2. Previous shoulder dystocia  3. Desires primary  section  4. Factor V Leiden Mutation, heterozygous  5. Tobacco abuse    Post-operative Diagnosis: same    Surgeon: Radha Noel MD     Assistants: aRfael Carbone MD    Anesthesia: Spinal anesthesia    Procedure Details   The patient was seen in the Holding Room. The risks, benefits, complications, treatment options, and expected outcomes were discussed with the patient.  The patient concurred with the proposed plan, giving informed consent.  The site of surgery properly noted/marked. The patient was taken to Operating Room # 1, identified as Sweetie Hernandez and the procedure verified as  Delivery. A Time Out was held and the above information confirmed.    After induction of anesthesia, the patient was draped and prepped in the usual sterile manner. A Pfannenstiel incision was made and carried down through the subcutaneous tissue to the fascia. Fascial incision was made and extended transversely. The fascia was  from the underlying rectus tissue superiorly and inferiorly. The peritoneum was identified and entered. Peritoneal incision was extended longitudinally. The utero-vesical peritoneal reflection was incised transversely and the bladder flap was bluntly freed from the lower uterine segment. A low transverse uterine incision was made. Delivered from vertex presentation was a  3458 gram male with Apgar scores of 8 at one minute and 9 at five minutes. After the umbilical cord was clamped and cut cord blood was obtained for evaluation. The placenta was removed intact and appeared normal. Uterus was exteriorized.  The uterine outline, tubes and ovaries appeared normal. The uterine incision was closed with running locked sutures of 0 Vicryl.  A second imbricating layer was placed using 0 Vicryl in a running  fashion.  A figure-of-eight stitch was placed at the left apex for further hemostasis.  Hemostasis was observed.  Uterus was returned to the patient's abdomen.  The hysterotomy was revisualized and remained hemostatic.  The peritoneum was reapproximated with 0 Vicryl in a running fashion.  The fascia was then reapproximated with running sutures of 0 Vicryl.  The subcutaneous layer was reapproximated with 3-0 Vicryl in a running fashion.  The skin was reapproximated with 4-0 Monocryl and Dermabond.    Instrument, sponge, and needle counts were correct prior the abdominal closure and at the conclusion of the case.     Findings:  Normal pelvic anatomy    Estimated Blood Loss:  800 mL           Drains: Murray           Specimens: Placenta           Implants: None           Complications:  None; patient tolerated the procedure well.           Disposition: PACU - hemodynamically stable.           Condition: stable    Attending Attestation: I was present and scrubbed for the entire procedure.
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
## 2018-08-21 NOTE — H&P
[facility]  Obstetric History and Physical    Chief Complaint   Patient presents with   • Scheduled      previous birth shoulder dystocia       Subjective     Patient is a 18 y.o. female  currently at 39w2d, who presents for scheduled primary  section due to previous history of shoulder dystocia.  Pregnancy has also been complicated by Factor V Leiden, but no personal history of clot, and tobacco abuse.    The following portions of the patients history were reviewed and updated as appropriate: current medications, allergies, past medical history, past surgical history, past family history, past social history and problem list .       Prenatal Information:  Prenatal Results     Initial Prenatal Labs     Test Value Reference Range Date Time    Hemoglobin 12.5 g/dL 11.1 - 15.9 g/dL 18 1334    Hematocrit 37.3 % 34.0 - 46.6 % 18 1334    Platelets 251 10*3/mm3 140 - 500 10*3/mm3 18 0549    Rubella IgG 1.34 index Immune >0.99 index 18 1334    Hepatitis B SAg Negative  Negative 18 1334    Hepatitis C Ab        RPR Non Reactive  Non Reactive 18 1334    ABO O   18 0518    Rh Positive   18 0518    Antibody Screen Negative  Negative 18 1334    HIV Non Reactive  Non Reactive 18 1334    Urine Culture No growth   18 1804    Gonorrhea Negative  Negative 18 0223    Chlamydia Negative  Negative 18 0223    TSH 1.880 mIU/mL 0.270 - 4.200 mIU/mL 18 1337          2nd and 3rd Trimester     Test Value Reference Range Date Time    Hemoglobin (repeated) 9.6 g/dL (L) 11.9 - 15.5 g/dL 18 0549    Hematocrit (repeated) 31.9 % (L) 35.6 - 45.5 % 18 0549    GCT 90 mg/dL 65 - 139 mg/dL 18 1406    Antibody Screen (repeated) Negative   18 0518    GTT Fasting        GTT 1 Hr        GTT 2 Hr        GTT 3 Hr        Group B Strep Negative  Negative 18 0303          Drug Screening     Test Value Reference Range Date  Time    Amphetamine Screen        Barbiturate Screen Negative  Negative 05/17/18 1335    Benzodiazepine Screen Negative  Negative 05/17/18 1335    Methadone Screen Negative  Negative 05/17/18 1335    Phencyclidine Screen        Opiates Screen Negative  Negative 05/17/18 1335    THC Screen Negative  Negative 05/17/18 1335    Cocaine Screen Negative  Negative 05/17/18 1335    Propoxyphene Screen        Buprenorphine Screen        Methamphetamine Screen        Oxycodone Screen Negative  Negative 05/17/18 1335    Tryicyclic Antidepressants Screen              Other (Risk screening)     Test Value Reference Range Date Time    Varicella IgG        Parvovirus IgG        CMV IgG        Cystic Fibrosis        Hemoglobin electrophoresis        NIPT        MSAFP-4 *Screen Negative*   03/26/18 1140    AFP (for NTD only)                  External Prenatal Results     Pregnancy Outside Results - Transcribed From Office Records - See Scanned Records For Details     Test Value Date Time    Hgb 9.6 g/dL (L) 08/21/18 0549    Hct 31.9 % (L) 08/21/18 0549    ABO O  08/21/18 0518    Rh Positive  08/21/18 0518    Antibody Screen Negative  08/21/18 0518    Glucose Fasting GTT       Glucose Tolerance Test 1 hour       Glucose Tolerance Test 3 hour       Gonorrhea (discrete) Negative  01/29/18 0223    Chlamydia (discrete) Negative  01/29/18 0223    RPR Non Reactive  01/29/18 1334    VDRL       Syphilis Antibody       Rubella 1.34 index 01/29/18 1334    HBsAg Negative  01/29/18 1334    Herpes Simplex Virus PCR       Herpes Simplex VIrus Culture       HIV Non Reactive  01/29/18 1334    Hep C RNA Quant PCR       Hep C Antibody       AFP 50.7 ng/mL 03/26/18 1140    Group B Strep Negative  07/23/18 0303    GBS Susceptibility to Clindamycin       GBS Susceptibility to Erythromycin       Fetal Fibronectin Negative  06/05/18 1524    Genetic Testing, Maternal Blood             Drug Screening     Test Value Date Time    Urine Drug Screen        Amphetamine Screen       Barbiturate Screen Negative  18 1335    Benzodiazepine Screen Negative  18 1335    Methadone Screen Negative  18 1335    Phencyclidine Screen       Opiates Screen Negative  18 1335    THC Screen Negative  18 1335    Cocaine Screen       Propoxyphene Screen       Buprenorphine Screen       Methamphetamine Screen       Oxycodone Screen Negative  18 1335    Tricyclic Antidepressants Screen                    Past OB History:     Obstetric History       T1      L1     SAB0   TAB0   Ectopic0   Molar0   Multiple0   Live Births1       # Outcome Date GA Lbr Thom/2nd Weight Sex Delivery Anes PTL Lv   2 Current            1 Term 17 38w5d 25:00 / 03:16 3695 g (8 lb 2.3 oz) F Vag-Spont EPI N HUBER      Name: GRACE VALERO      Complications: Shoulder Dystocia      Apgar1:  5                Apgar5: 8          Past Medical History: Past Medical History:   Diagnosis Date   • Anxiety    • Bipolar disorder (CMS/[facility])    • Depression     meds prior to pregnancy   • Factor V Leiden (CMS/[facility])    • Preeclampsia    • Trichimoniasis    • Urinary tract infection    • Urogenital trichomoniasis       Past Surgical History Past Surgical History:   Procedure Laterality Date   • ADENOIDECTOMY     • COLONOSCOPY     • EYE SURGERY     • TONSILLECTOMY        Family History: Family History   Problem Relation Age of Onset   • Diabetes Mother    • Hypertension Mother       Social History:  reports that she has been smoking Cigarettes.  She has been smoking about 1.00 pack per day. She has never used smokeless tobacco.   reports that she does not drink alcohol.   reports that she does not use drugs.        General ROS: Pertinent items are noted in HPI, all other systems reviewed and negative    Objective       Vital Signs Range for the last 24 hours  Temperature: Temp:  [98.3 °F (36.8 °C)] 98.3 °F (36.8 °C)   Temp Source: Temp src: Oral   BP: BP: (114)/(67) 114/67    Pulse: Heart Rate:  [90] 90   Respirations: Resp:  [20] 20   SPO2: SpO2:  [99 %] 99 %   O2 Amount (l/min):     O2 Devices     Weight: Weight:  [93.4 kg (206 lb)] 93.4 kg (206 lb)     Physical Examination: General appearance - alert, well appearing, and in no distress  Chest - clear to auscultation, no wheezes, rales or rhonchi, symmetric air entry  Heart - normal rate and regular rhythm  Abdomen - gravid, soft, nontender, fundal height c/w dates  Extremities - peripheral pulses normal, no pedal edema, no clubbing or cyanosis    Presentation:    Cervix: Exam by:     Dilation:     Effacement:     Station:         Fetal Heart Rate Assessment   Method: Fetal HR Assessment Method: external   Beats/min: Fetal HR (beats/min): 130   Baseline: Fetal Heart Baseline Rate: normal range   Variability: Fetal HR Variability: moderate (amplitude range 6 to 25 bpm)   Accels: Fetal HR Accelerations: greater than/equal to 15 bpm, lasting at least 15 seconds   Decels: Fetal HR Decelerations: absent   Tracing Category:       Uterine Assessment   Method: Method: external tocotransducer   Frequency (min): Contraction Frequency (Minutes): 8-9   Ctx Count in 10 min: Contractions in 10 Minutes: 1   Duration:     Intensity: Contraction Intensity: mild by palpation   Intensity by IUPC:     Resting Tone: Uterine Resting Tone: soft by palpation   Resting Tone by IUPC:     [place] Units:         Assessment/Plan     Principal Problem:    H/O shoulder dystocia in prior pregnancy, currently pregnant  Active Problems:    Factor 5 Leiden mutation, heterozygous (CMS/HCC)    Tobacco use affecting pregnancy, antepartum        Assessment:  1.  Intrauterine pregnancy at 39w2d gestation with reassuring fetal status.    2.  Previous history of shoulder dystocia  3.  Obstetrical history significant for Factor V Leiden mutation and tobacco abuse.  4.  GBS status:   Strep Gp B Culture   Date Value Ref Range Status   07/23/2018 Negative Negative Final      Comment:     Centers for Disease Control and Prevention (CDC) and American Congress  of Obstetricians and Gynecologists (ACOG) guidelines for prevention of   group B streptococcal (GBS) disease specify co-collection of  a vaginal and rectal swab specimen to maximize sensitivity of GBS  detection. Per the CDC and ACOG, swabbing both the lower vagina and  rectum substantially increases the yield of detection compared with  sampling the vagina alone.  Penicillin G, ampicillin, or cefazolin are indicated for intrapartum  prophylaxis of  GBS colonization. Reflex susceptibility  testing should be performed prior to use of clindamycin only on GBS  isolates from penicillin-allergic women who are considered a high risk  for anaphylaxis. Treatment with vancomycin without additional testing  is warranted if resistance to clindamycin is noted.         Plan:  1. Primary  scheduled.  Risks and benefits of primary  section versus trial of labor were discussed with patient.  She understands that she is at increased risk for shoulder dystocia given her previous history of shoulder dystocia.  She has elected to proceed with primary  section.  Risks discussed with her including risk for infection, bleeding, damage to surrounding structures, need for additional surgery if injury occurs, risk of anesthesia including blood clots, heart attack, and stroke.  Patient understands all the surgical risks and agrees to proceed.  2. Plan of care has been reviewed with patient.  3.  Risks, benefits of treatment plan have been discussed.  4.  All questions have been answered.        Radha Noel MD  2018  7:16 AM

## 2018-08-21 NOTE — ADDENDUM NOTE
Addendum  created 08/21/18 0956 by Nan Ji CRNA    Anesthesia Intra Blocks edited, Sign clinical note

## 2018-08-21 NOTE — PLAN OF CARE
Problem: Patient Care Overview  Goal: Plan of Care Review  Outcome: Ongoing (interventions implemented as appropriate)   18 0850   Coping/Psychosocial   Plan of Care Reviewed With patient   Plan of Care Review   Progress improving     Goal: Individualization and Mutuality  Outcome: Ongoing (interventions implemented as appropriate)   18 0850   Individualization   Patient Specific Preferences bottle feeding, pain control   Mutuality/Individual Preferences   What Anxieties, Fears, Concerns, or Questions Do You Have About Your Care? none     Goal: Discharge Needs Assessment  Outcome: Ongoing (interventions implemented as appropriate)   18 0850   Discharge Needs Assessment   Concerns to be Addressed denies needs/concerns at this time   Patient/Family Anticipates Transition to home with family   Patient/Family Anticipated Services at Transition none   Transportation Concerns car, none   Transportation Anticipated family or friend will provide   Anticipated Changes Related to Illness none   Equipment Needed After Discharge none   Disability   Equipment Currently Used at Home none     Goal: Interprofessional Rounds/Family Conf  Outcome: Ongoing (interventions implemented as appropriate)   18 0850   Interdisciplinary Rounds/Family Conf   Participants nursing;patient;physician       Problem: Suicide Risk (Adult)  Goal: Identify Related Risk Factors and Signs and Symptoms  Outcome: Ongoing (interventions implemented as appropriate)   18 0850   Suicide Risk (Adult)   Related Risk Factors (Suicide Risk) multiple stressors;mental health diagnosis     Goal: Strength-Based Wellness/Recovery  Outcome: Ongoing (interventions implemented as appropriate)      Problem:  Delivery (Adult,Obstetrics,Pediatric)  Goal: Signs and Symptoms of Listed Potential Problems Will be Absent, Minimized or Managed ( Delivery)  Outcome: Outcome(s) achieved Date Met: 18 0850   Goal/Outcome  Evaluation   Problems Assessed ( Delivery) all   Problems Present ( Delivery) none       Problem: Fall Risk,  (Adult,Obstetrics,Pediatric)  Goal: Identify Related Risk Factors and Signs and Symptoms   18 0850   Fall Risk,  (Adult,Obstetrics,Pediatric)   Related Risk Factors (Fall Risk, ) regional anesthesia   Signs and Symptoms (Fall Risk, ) presence of fall risk factors

## 2018-08-21 NOTE — LACTATION NOTE
Pt states she wants to try breastfeeding.  Reviewed safety of L3 with pt.  She denies questions/concerns.  Declines assistance with feeding.  Pt states milk did come in with first although she did not breastfeed.  Reviewed diet and feeding patterns.  Encouraged pt to call LC for assistance with latching.

## 2018-08-21 NOTE — ANESTHESIA PROCEDURE NOTES
Intrathecal Block    Patient location during procedure: OB  Indication:at surgeon's request, post-op pain management and procedure for pain  Performed By  Anesthesiologist: THERESA MARCELO  CRNA: ZACHARY CARRION  Preanesthetic Checklist  Completed: patient identified, site marked, surgical consent, pre-op evaluation, timeout performed, IV checked, risks and benefits discussed and monitors and equipment checked  Additional Notes  Pt sitting, chloroprep used, sterile technique (gloves/mask/drape). L5-S1 level, 24G Sprotte used with spinal tray. Negative paresthesia. Positive CSF aspirated, free flowing. Negative blood aspirated.       Intrathecal Block Prep:  Pt Position:sitting  Sterile Tech:sterile barrier, gloves, cap and mask  Prep:chloraprep  Monitoring:blood pressure monitoring, continuous pulse oximetry and EKG  Intrathecal Block Procedure:  Approach:midline  Guidance:landmark technique and palpation technique  Location:L4-L5  Needle Type:Sprotte  Needle Gauge:24 G  Placement of Needle Event: cerebrospinal fluid  Fluid Appearance:clear  Post Assessment:  Patient Tolerance:patient tolerated the procedure well with no apparent complications  Complications:no

## 2018-08-21 NOTE — ANESTHESIA POSTPROCEDURE EVALUATION
"Patient: Sweetie Hernandez    Procedure Summary     Date:  18 Room / Location:   [facility]  /  [facility]    Anesthesia Start:  728 Anesthesia Stop:  836    Procedure:   SECTION PRIMARY (N/A Abdomen) Diagnosis:       History of shoulder dystocia in prior pregnancy      (History of shoulder dystocia in prior pregnancy [Z87.59])    Surgeon:  Radha Noel MD Provider:  Vladimir Aviles MD    Anesthesia Type:  spinal ASA Status:  2          Anesthesia Type: spinal  Last vitals  BP   129/67 (18 0940)   Temp   36.8 °C (98.3 °F) (18 09)   Pulse   77 (18 0945)   Resp   17 (18)     SpO2   100 % (1845)     Post Anesthesia Care and Evaluation    Patient location during evaluation: bedside  Patient participation: complete - patient participated  Level of consciousness: awake and alert  Pain management: adequate  Airway patency: patent  Anesthetic complications: No anesthetic complications    Cardiovascular status: acceptable  Respiratory status: acceptable  Hydration status: acceptable    Comments: /67   Pulse 77   Temp 36.8 °C (98.3 °F) (Oral)   Resp 17   Ht 160 cm (62.99\")   Wt 93.4 kg (206 lb)   LMP 2017   SpO2 100%   Breastfeeding? Unknown   BMI 36.50 kg/m²       "

## 2018-08-21 NOTE — ANESTHESIA PREPROCEDURE EVALUATION
Anesthesia Evaluation     Patient summary reviewed and Nursing notes reviewed   NPO Solid Status: > 8 hours  NPO Liquid Status: > 8 hours           Airway   Mallampati: II  TM distance: >3 FB  Neck ROM: full  no difficulty expected  Dental - normal exam     Pulmonary - negative pulmonary ROS and normal exam   Cardiovascular - normal exam    (+) hypertension,       Neuro/Psych  (+) psychiatric history Anxiety and Depression,     GI/Hepatic/Renal/Endo    (+) obesity, morbid obesity,      Musculoskeletal (-) negative ROS    Abdominal  - normal exam   Substance History - negative use     OB/GYN    (+) Pregnant, Preeclampsia, pregnancy induced hypertension        Other - negative ROS                       Anesthesia Plan    ASA 2     spinal     Anesthetic plan and risks discussed with patient and spouse/significant other.

## 2018-08-22 LAB
BASOPHILS # BLD AUTO: 0.01 10*3/MM3 (ref 0–0.2)
BASOPHILS NFR BLD AUTO: 0.1 % (ref 0–1.5)
DEPRECATED RDW RBC AUTO: 48 FL (ref 37–54)
EOSINOPHIL # BLD AUTO: 0.15 10*3/MM3 (ref 0–0.7)
EOSINOPHIL NFR BLD AUTO: 1.3 % (ref 0.3–6.2)
ERYTHROCYTE [DISTWIDTH] IN BLOOD BY AUTOMATED COUNT: 16 % (ref 11.7–13)
HCT VFR BLD AUTO: 29 % (ref 35.6–45.5)
HGB BLD-MCNC: 8.8 G/DL (ref 11.9–15.5)
IMM GRANULOCYTES # BLD: 0.09 10*3/MM3 (ref 0–0.03)
IMM GRANULOCYTES NFR BLD: 0.8 % (ref 0–0.5)
LYMPHOCYTES # BLD AUTO: 1.7 10*3/MM3 (ref 0.9–4.8)
LYMPHOCYTES NFR BLD AUTO: 14.6 % (ref 19.6–45.3)
MCH RBC QN AUTO: 25.1 PG (ref 26.9–32)
MCHC RBC AUTO-ENTMCNC: 30.3 G/DL (ref 32.4–36.3)
MCV RBC AUTO: 82.6 FL (ref 80.5–98.2)
MONOCYTES # BLD AUTO: 0.87 10*3/MM3 (ref 0.2–1.2)
MONOCYTES NFR BLD AUTO: 7.4 % (ref 5–12)
NEUTROPHILS # BLD AUTO: 8.95 10*3/MM3 (ref 1.9–8.1)
NEUTROPHILS NFR BLD AUTO: 76.6 % (ref 42.7–76)
PLATELET # BLD AUTO: 210 10*3/MM3 (ref 140–500)
PMV BLD AUTO: 10.7 FL (ref 6–12)
RBC # BLD AUTO: 3.51 10*6/MM3 (ref 3.9–5.2)
WBC NRBC COR # BLD: 11.68 10*3/MM3 (ref 4.5–10.7)

## 2018-08-22 PROCEDURE — 85025 COMPLETE CBC W/AUTO DIFF WBC: CPT | Performed by: OBSTETRICS & GYNECOLOGY

## 2018-08-22 PROCEDURE — 99232 SBSQ HOSP IP/OBS MODERATE 35: CPT | Performed by: OBSTETRICS & GYNECOLOGY

## 2018-08-22 RX ORDER — MEDROXYPROGESTERONE ACETATE 150 MG/ML
150 INJECTION, SUSPENSION INTRAMUSCULAR ONCE
Status: COMPLETED | OUTPATIENT
Start: 2018-08-22 | End: 2018-08-25

## 2018-08-22 RX ORDER — DULOXETIN HYDROCHLORIDE 20 MG/1
20 CAPSULE, DELAYED RELEASE ORAL DAILY
Status: DISCONTINUED | OUTPATIENT
Start: 2018-08-22 | End: 2018-08-25 | Stop reason: HOSPADM

## 2018-08-22 RX ADMIN — IBUPROFEN 800 MG: 800 TABLET ORAL at 19:39

## 2018-08-22 RX ADMIN — DOCUSATE SODIUM 100 MG: 100 CAPSULE, LIQUID FILLED ORAL at 14:50

## 2018-08-22 RX ADMIN — OXYCODONE HYDROCHLORIDE AND ACETAMINOPHEN 2 TABLET: 5; 325 TABLET ORAL at 14:50

## 2018-08-22 RX ADMIN — IBUPROFEN 800 MG: 800 TABLET ORAL at 22:50

## 2018-08-22 RX ADMIN — OXYCODONE HYDROCHLORIDE AND ACETAMINOPHEN 1 TABLET: 5; 325 TABLET ORAL at 06:23

## 2018-08-22 RX ADMIN — IBUPROFEN 800 MG: 800 TABLET ORAL at 06:22

## 2018-08-22 RX ADMIN — Medication 1 TABLET: at 14:49

## 2018-08-22 RX ADMIN — OXYCODONE HYDROCHLORIDE AND ACETAMINOPHEN 2 TABLET: 5; 325 TABLET ORAL at 21:15

## 2018-08-22 RX ADMIN — DULOXETINE 20 MG: 20 CAPSULE, DELAYED RELEASE ORAL at 19:50

## 2018-08-22 NOTE — PROGRESS NOTES
Reviewed chart Interviewed pt the father of baby present pt wanted him to stay.   Pt with her baby and appeared nurturing and baby was content to lay on pt's stomach.  Pt said she was not feeling depressed or experiencing anxiety w/ no SI.  Consulted with Anahi during report who stated that they had no concerns for pt.

## 2018-08-22 NOTE — PROGRESS NOTES
"Subjective:    Cc:  Postpartum    Postpartum Day 1:     The patient feels well.  Pain is well controlled with current medications. The baby is well.  Urinary output is adequate. The patient is ambulating well. The patient is tolerating a normal diet. Flatus denies been passed.  Patient is requesting Depo Provera for contraception.  She was on Cymbalta in past for depression and would like to resume.  She is unsure of dose.  Finally, patient reports history of Factor V mutation.  However, she was not on LMWH during pregnancy.  During her last pregnancy, she was on baby ASA and took Lovenox for only one week postpartum.      Objective:    Vital signs in last 24 hours:  Blood pressure 103/68, pulse 109, temperature 98.8 °F (37.1 °C), temperature source Oral, resp. rate 18, height 160 cm (62.99\"), weight 93.4 kg (206 lb), last menstrual period 11/19/2017, SpO2 95 %, unknown if currently breastfeeding.      General:    alert, appears stated age and cooperative   Uterine Fundus:   firm   Incision:  healing well, no significant drainage, no dehiscence, no significant erythema     Labs    Rh + / Male infant    WBC   Date Value Ref Range Status   08/22/2018 11.68 (H) 4.50 - 10.70 10*3/mm3 Final     RBC   Date Value Ref Range Status   08/22/2018 3.51 (L) 3.90 - 5.20 10*6/mm3 Final     Hemoglobin   Date Value Ref Range Status   08/22/2018 8.8 (L) 11.9 - 15.5 g/dL Final     Hematocrit   Date Value Ref Range Status   08/22/2018 29.0 (L) 35.6 - 45.5 % Final     MCV   Date Value Ref Range Status   08/22/2018 82.6 80.5 - 98.2 fL Final     MCH   Date Value Ref Range Status   08/22/2018 25.1 (L) 26.9 - 32.0 pg Final     MCHC   Date Value Ref Range Status   08/22/2018 30.3 (L) 32.4 - 36.3 g/dL Final     RDW   Date Value Ref Range Status   08/22/2018 16.0 (H) 11.7 - 13.0 % Final     RDW-SD   Date Value Ref Range Status   08/22/2018 48.0 37.0 - 54.0 fl Final     MPV   Date Value Ref Range Status   08/22/2018 10.7 6.0 - 12.0 fL Final "     Platelets   Date Value Ref Range Status   08/22/2018 210 140 - 500 10*3/mm3 Final     Neutrophil %   Date Value Ref Range Status   08/22/2018 76.6 (H) 42.7 - 76.0 % Final     Lymphocyte %   Date Value Ref Range Status   08/22/2018 14.6 (L) 19.6 - 45.3 % Final     Monocyte %   Date Value Ref Range Status   08/22/2018 7.4 5.0 - 12.0 % Final     Eosinophil %   Date Value Ref Range Status   08/22/2018 1.3 0.3 - 6.2 % Final     Basophil %   Date Value Ref Range Status   08/22/2018 0.1 0.0 - 1.5 % Final     Immature Grans %   Date Value Ref Range Status   08/22/2018 0.8 (H) 0.0 - 0.5 % Final     Neutrophils, Absolute   Date Value Ref Range Status   08/22/2018 8.95 (H) 1.90 - 8.10 10*3/mm3 Final     Lymphocytes, Absolute   Date Value Ref Range Status   08/22/2018 1.70 0.90 - 4.80 10*3/mm3 Final     Monocytes, Absolute   Date Value Ref Range Status   08/22/2018 0.87 0.20 - 1.20 10*3/mm3 Final     Eosinophils, Absolute   Date Value Ref Range Status   08/22/2018 0.15 0.00 - 0.70 10*3/mm3 Final     Basophils, Absolute   Date Value Ref Range Status   08/22/2018 0.01 0.00 - 0.20 10*3/mm3 Final     Immature Grans, Absolute   Date Value Ref Range Status   08/22/2018 0.09 (H) 0.00 - 0.03 10*3/mm3 Final     Assessment/Plan:.     Postpartum Day #1  - History of depression.  Patient was placed on Celexa but wants to change to Cymbalta.  Patient does not know dose.  Will start at lowest level.  - Contraception.  Patient wants to start on Depo Provera and this will be administered prior to discharge.  - C/S.  Incision healing well.  Discussed pain control.  - Circumcision.  Discussed method of circumcision.  Discussed elective nature of procedure.  Discussed risks including bleeding, infection and aesthetic issues.  Patient voices understanding and agrees to proceed.      Jed Duron MD     I spent greater than 30 minutes floor time/face time/chart review and greater than 20 minutes in face to face  counseling/coordination of care.
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
## 2018-08-23 PROCEDURE — 25010000002 ENOXAPARIN PER 10 MG: Performed by: OBSTETRICS & GYNECOLOGY

## 2018-08-23 PROCEDURE — 99232 SBSQ HOSP IP/OBS MODERATE 35: CPT | Performed by: OBSTETRICS & GYNECOLOGY

## 2018-08-23 RX ADMIN — IBUPROFEN 800 MG: 800 TABLET ORAL at 21:47

## 2018-08-23 RX ADMIN — Medication 1 TABLET: at 08:07

## 2018-08-23 RX ADMIN — OXYCODONE HYDROCHLORIDE AND ACETAMINOPHEN 2 TABLET: 5; 325 TABLET ORAL at 08:07

## 2018-08-23 RX ADMIN — DOCUSATE SODIUM 100 MG: 100 CAPSULE, LIQUID FILLED ORAL at 08:07

## 2018-08-23 RX ADMIN — OXYCODONE HYDROCHLORIDE AND ACETAMINOPHEN 2 TABLET: 5; 325 TABLET ORAL at 15:04

## 2018-08-23 RX ADMIN — OXYCODONE HYDROCHLORIDE AND ACETAMINOPHEN 1 TABLET: 5; 325 TABLET ORAL at 21:47

## 2018-08-23 RX ADMIN — IBUPROFEN 800 MG: 800 TABLET ORAL at 15:56

## 2018-08-23 RX ADMIN — ENOXAPARIN SODIUM 40 MG: 40 INJECTION SUBCUTANEOUS at 15:56

## 2018-08-23 RX ADMIN — DULOXETINE 20 MG: 20 CAPSULE, DELAYED RELEASE ORAL at 15:57

## 2018-08-23 RX ADMIN — IBUPROFEN 800 MG: 800 TABLET ORAL at 08:07

## 2018-08-23 NOTE — PROGRESS NOTES
Continued Stay Note  [facility]     Patient Name: Sweetie Hernandez  MRN: [id number]  Today's Date: 8/23/2018    Admit Date: 8/21/2018          Discharge Plan     Row Name 08/23/18 1543       Plan    Plan Home    Plan Comments ECO received on Baby Dev Hernandez and placed on Baby's chart along with a letter from [facility] CPS Worker Gilberto Choi outlining D/C plan.  CCP  will follow to assist as needed and will continue to follow for baby's cord blood results.                 Discharge Codes    No documentation.       Expected Discharge Date and Time     Expected Discharge Date Expected Discharge Time    Aug 21, 2018             SONA Reardon

## 2018-08-23 NOTE — PROGRESS NOTES
Section Progress Note    Assessment/Plan     Status post  section: Doing well postoperatively.     1) Postpartum care immediate after delivery - Doing well.   2) Anemia postpartum - Hg to 8.8 grams, asymptomatic - encouraged po iron   3) PP depression on cymbalta reports doing well.   4) Desires Depo-provera prior to discharge.   5) Factor V leiden  On SCDs, but now that 24 hours postpartum, would at least do Lovenox in hospital     Rh status: O positive   Rubella: immune  Gender: Male    Subjective     Postpartum Day 2:  Delivery    The patient feels well. The patient denies emotional concerns. Pain is well controlled with current medications. The baby is well.Urinary output is adequate. The patient is ambulating well. The patient is tolerating a normal diet. Patient reports flatus.    Objective     Vital signs in last 24 hours:  Temp:  [98 °F (36.7 °C)-98.4 °F (36.9 °C)] 98 °F (36.7 °C)  Heart Rate:  [] 85  Resp:  [16-18] 18  BP: (107-130)/(70-87) 130/87      General:    alert, appears stated age and cooperative   Bowel Sounds:  active   Lochia:  appropriate   Uterine Fundus:   firm   Incision:  healing well, no significant drainage, no dehiscence, no significant erythema   DVT Evaluation:  No evidence of DVT seen on physical exam.     Lab Results   Component Value Date    WBC 11.68 (H) 2018    HGB 8.8 (L) 2018    HCT 29.0 (L) 2018    MCV 82.6 2018     2018         Kelvin Strong MD  2018  9:47 AM

## 2018-08-23 NOTE — CONSULTS
Reviewed chart and spoke to pt JOSE CARLOS Zaman who has no concerns for the pt at this time.  Interviewed pt who was cooperative and answered questions easily. Pt said she suffered from post partum depression with her first child. Pt states she is not feeling anxious or depressed nor any SI or HI. Encouraged ans supported pt when discussing her baby and custody order. She stated she was good with the arrangement.

## 2018-08-24 PROCEDURE — 25010000002 ENOXAPARIN PER 10 MG: Performed by: OBSTETRICS & GYNECOLOGY

## 2018-08-24 PROCEDURE — 99232 SBSQ HOSP IP/OBS MODERATE 35: CPT | Performed by: OBSTETRICS & GYNECOLOGY

## 2018-08-24 RX ADMIN — IBUPROFEN 800 MG: 800 TABLET ORAL at 14:02

## 2018-08-24 RX ADMIN — DULOXETINE 20 MG: 20 CAPSULE, DELAYED RELEASE ORAL at 11:03

## 2018-08-24 RX ADMIN — DOCUSATE SODIUM 100 MG: 100 CAPSULE, LIQUID FILLED ORAL at 09:12

## 2018-08-24 RX ADMIN — IBUPROFEN 800 MG: 800 TABLET ORAL at 06:16

## 2018-08-24 RX ADMIN — ENOXAPARIN SODIUM 40 MG: 40 INJECTION SUBCUTANEOUS at 14:03

## 2018-08-24 RX ADMIN — OXYCODONE HYDROCHLORIDE AND ACETAMINOPHEN 1 TABLET: 5; 325 TABLET ORAL at 23:06

## 2018-08-24 RX ADMIN — OXYCODONE HYDROCHLORIDE AND ACETAMINOPHEN 2 TABLET: 5; 325 TABLET ORAL at 06:16

## 2018-08-24 RX ADMIN — IBUPROFEN 800 MG: 800 TABLET ORAL at 23:05

## 2018-08-24 RX ADMIN — OXYCODONE HYDROCHLORIDE AND ACETAMINOPHEN 2 TABLET: 5; 325 TABLET ORAL at 14:02

## 2018-08-24 RX ADMIN — Medication 1 TABLET: at 09:12

## 2018-08-24 NOTE — PROGRESS NOTES
Continued Stay Note  [facility]     Patient Name: Sweetie Hernandez  MRN: [id number]  Today's Date: 8/24/2018    Admit Date: 8/21/2018          Discharge Plan     Row Name 08/24/18 1331       Plan    Plan Home and Pt can transportation home with baby and sister Ivett Ribeiro    Plan Comments Per mom plan will be to D/C tomorrow 8/25/18.   Baby will be D/C'ed to mom's sister Ivett Ribeiro per Emergency Custody Order (ECO) .   ECO and Letter from [facility] CPS on baby's chart.   Also  2 [facility] forms needing to be signed by  Ivett Jefallison for hospital record  also placed in front of Baby's chart.   Per CPS worker Gilberto Choi plan is for Ivett to transport both mom and baby upon D/C.  [facility]  faxed cord blood results to [facility] CPS attention Gilberto Choi to # [phone].                Discharge Codes    No documentation.       Expected Discharge Date and Time     Expected Discharge Date Expected Discharge Time    Aug 21, 2018             SONA Reardon

## 2018-08-24 NOTE — PROGRESS NOTES
Section Progress Note    Assessment/Plan     Status post  section: Doing well postoperatively.     Continue current care.  Factor V Leiden heterozygote: Given obesity,  delivery will continue Lovenox prophylaxis for 6 weeks postpartum. Patient reports that she is comfortable giving herself the injections and that her sister is a nurse and can assist her in this.  She was counseled on risks and benefits and agrees with plan.  Depression, on Cymbalta.  Reports mood stable  Desires DMPA prior to discharge   Acute on chronic anemia: hemodynamically stable and asymptomatic.  Encourage Iron  Desires to stay today as infant is staying for monitoring  Rh status: O positive  Rubella: Immune  Gender: Male, s/p circumcision    Subjective     Postpartum Day 3:  Delivery    The patient feels well. The patient denies emotional concerns. Pain is well controlled with current medications. The baby is overall doing well. The patient is ambulating well. The patient is tolerating a normal diet. Patient reports flatus.    Objective     Vital signs in last 24 hours:  Temp:  [98.1 °F (36.7 °C)-99.1 °F (37.3 °C)] 98.1 °F (36.7 °C)  Heart Rate:  [84-91] 84  Resp:  [16-18] 18  BP: (103-120)/(68-80) 111/73      General:    alert, appears stated age and cooperative   CV: RRR, no m/r/g   Lungs: CTAB, no wheezes, no respiratory distress   Bowel Sounds:  active   Lochia:  appropriate   Uterine Fundus:   firm   Incision:  healing well, no significant drainage, no dehiscence, no significant erythema   DVT Evaluation:  No evidence of DVT seen on physical exam.     Lab Results   Component Value Date    WBC 11.68 (H) 2018    HGB 8.8 (L) 2018    HCT 29.0 (L) 2018    MCV 82.6 2018     2018         Brigid Smith MD  2018  8:36 AM

## 2018-08-24 NOTE — PROGRESS NOTES
Met w/ patient, reviewed chart and spoke w/  ms Chavez and RN.  Patient is pleasant and has been cooperative w/ care. States will be w/ sister for a while and then return to [place], [place] and go to mental [facility] otpt therapy at [facility].  She has been restarted on Cymbalta and is pleased w/ that.  No further need for [facility] involvement. Will sign off.

## 2018-08-25 VITALS
RESPIRATION RATE: 16 BRPM | OXYGEN SATURATION: 95 % | SYSTOLIC BLOOD PRESSURE: 124 MMHG | DIASTOLIC BLOOD PRESSURE: 81 MMHG | BODY MASS INDEX: 36.5 KG/M2 | HEART RATE: 86 BPM | WEIGHT: 206 LBS | HEIGHT: 63 IN | TEMPERATURE: 98.6 F

## 2018-08-25 PROBLEM — Z30.013 ENCOUNTER FOR INITIAL PRESCRIPTION OF INJECTABLE CONTRACEPTIVE: Status: ACTIVE | Noted: 2018-08-25

## 2018-08-25 PROCEDURE — 99239 HOSP IP/OBS DSCHRG MGMT >30: CPT | Performed by: OBSTETRICS & GYNECOLOGY

## 2018-08-25 PROCEDURE — 25010000002 ENOXAPARIN PER 10 MG: Performed by: OBSTETRICS & GYNECOLOGY

## 2018-08-25 RX ORDER — OXYCODONE HYDROCHLORIDE AND ACETAMINOPHEN 5; 325 MG/1; MG/1
1-2 TABLET ORAL EVERY 6 HOURS PRN
Qty: 30 TABLET | Refills: 0 | Status: SHIPPED | OUTPATIENT
Start: 2018-08-25 | End: 2018-09-01

## 2018-08-25 RX ORDER — PSEUDOEPHEDRINE HCL 30 MG
100 TABLET ORAL 2 TIMES DAILY PRN
Qty: 30 CAPSULE | Refills: 1 | Status: SHIPPED | OUTPATIENT
Start: 2018-08-25 | End: 2019-01-02

## 2018-08-25 RX ORDER — IBUPROFEN 800 MG/1
800 TABLET ORAL EVERY 8 HOURS PRN
Qty: 30 TABLET | Refills: 1 | Status: SHIPPED | OUTPATIENT
Start: 2018-08-25 | End: 2019-01-02

## 2018-08-25 RX ORDER — DULOXETIN HYDROCHLORIDE 20 MG/1
20 CAPSULE, DELAYED RELEASE ORAL DAILY
Qty: 30 CAPSULE | Refills: 0 | Status: SHIPPED | OUTPATIENT
Start: 2018-08-26 | End: 2018-09-17 | Stop reason: SDUPTHER

## 2018-08-25 RX ORDER — FERROUS SULFATE 325(65) MG
325 TABLET ORAL
Qty: 30 TABLET | Refills: 1 | Status: SHIPPED | OUTPATIENT
Start: 2018-08-25 | End: 2018-12-10

## 2018-08-25 RX ADMIN — Medication 1 TABLET: at 08:42

## 2018-08-25 RX ADMIN — DULOXETINE 20 MG: 20 CAPSULE, DELAYED RELEASE ORAL at 08:42

## 2018-08-25 RX ADMIN — IBUPROFEN 800 MG: 800 TABLET ORAL at 08:42

## 2018-08-25 RX ADMIN — ENOXAPARIN SODIUM 40 MG: 40 INJECTION SUBCUTANEOUS at 14:17

## 2018-08-25 RX ADMIN — OXYCODONE HYDROCHLORIDE AND ACETAMINOPHEN 1 TABLET: 5; 325 TABLET ORAL at 08:42

## 2018-08-25 RX ADMIN — MEDROXYPROGESTERONE ACETATE 150 MG: 150 INJECTION, SUSPENSION, EXTENDED RELEASE INTRAMUSCULAR at 12:48

## 2018-08-25 NOTE — DISCHARGE SUMMARY
Date of Discharge:  2018    Discharge Diagnosis: Status post primary low transverse  delivery, postoperative day #4  2.  History of postpartum depression  3.  Request contraception with Depo-Provera  4.  Factor V Leiden mutation  5.  History of prior shoulder dystocia, delivered    Presenting Problem/History of Present Illness  H/O shoulder dystocia in prior pregnancy, currently pregnant [O09.299]     Hospital Course  Patient is a 18 y.o. female presented at 39 weeks of gestation for primary  section secondary to history of a prior shoulder dystocia.  Please see operative report for details.  Her postpartum course was uncomplicated.  She was started on Cymbalta in the postpartum setting because of a history of postpartum depression.  She was also started on prophylactic Lovenox in the postpartum setting because of factor V Leiden mutation and maternal obesity.  This we continued until 6 weeks postpartum.  By postoperative day #4, she was meeting all milestones for discharge.  Extensive discharge instructions were given to the patient who verbalized understanding.  All of her questions are answered..      Procedures Performed  Procedure(s):   SECTION PRIMARY       Consults:   Consults     No orders found from 2018 to 2018.          Pertinent Test Results:         Invalid input(s): POTASSIUM, LABRCNTIP, CRCL, LABRCNTIP      CrCl cannot be calculated (Patient's most recent lab result is older than the maximum 30 days allowed.).    Results from last 7 days  Lab Units 18  0422 18  0549   WBC 10*3/mm3 11.68* 11.41*   HEMOGLOBIN g/dL 8.8* 9.6*   PLATELETS 10*3/mm3 210 251               Condition on Discharge:  good    Vital Signs  Temp:  [98.1 °F (36.7 °C)-98.9 °F (37.2 °C)] 98.6 °F (37 °C)  Heart Rate:  [73-89] 86  Resp:  [16-17] 16  BP: (103-128)/(66-82) 124/81    Physical Exam:   See progress notes    Discharge Disposition  Home or Self Care    Discharge Medications      Discharge Medications      New Medications      Instructions Start Date   docusate sodium 100 MG capsule   100 mg, Oral, 2 Times Daily PRN      DULoxetine 20 MG capsule  Commonly known as:  CYMBALTA   20 mg, Oral, Daily      enoxaparin 40 MG/0.4ML solution syringe  Commonly known as:  LOVENOX   40 mg, Subcutaneous, Every 24 Hours      ferrous sulfate 325 (65 FE) MG tablet   325 mg, Oral, Daily With Breakfast      ibuprofen 800 MG tablet  Commonly known as:  ADVIL,MOTRIN   800 mg, Oral, Every 8 Hours PRN      oxyCODONE-acetaminophen 5-325 MG per tablet  Commonly known as:  PERCOCET   1-2 tablets, Oral, Every 6 Hours PRN         Continue These Medications      Instructions Start Date   prenatal (CLASSIC) vitamin 28-0.8 MG tablet tablet   1 tablet, Oral, Daily         Stop These Medications    aspirin 81 MG tablet     citalopram 20 MG tablet  Commonly known as:  CELEXA            Discharge Diet:   Diet Instructions     Diet: Regular       Discharge Diet:  Regular          Activity at Discharge:  Gradually resume light activities over the next 1-2 weeks.  No heavy lifting over 10 pounds for the next 6 weeks.  You may do a normal amount of walking around the house.  You may go up and down stairs as needed.  No strenuous exercise or strenuous activities for the next 6 weeks.  You may resume driving when you're not in any pain or taking narcotic pain medication.  This typically last 1-2 weeks, but may be longer for some patients.  It is normal to have light to moderate vaginal bleeding for up to 6 weeks after delivery.  You may shower.  No submerging in a bathtub for 6 weeks after delivery.  Do not resume sexual intercourse for 6 weeks.  Call the office or return to the hospital for temperature greater than 100.5°F, heavy vaginal bleeding soaking a pad an hour, severe abdominal or vaginal pain not improved with pain medication, persistent nausea and vomiting, wound breakdown, or any other medical concerns.  Follow-up  with your OB/GYN in 2 weeks.  Please call the office to make an appointment.      Follow-up Appointments  No future appointments.  Additional Instructions for the Follow-ups that You Need to Schedule     Discharge Follow-up with Specified Provider: Dr. Noel; 2 Weeks    As directed      To:  Dr. Noel    Follow Up:  2 Weeks               Test Results Pending at Discharge       Rowdy Snyder MD  08/25/18  11:15 AM    Time: Greater than 30 minutes was spent in discharge

## 2018-08-25 NOTE — LACTATION NOTE
Discharge today.  Pt has been exclusively formula feeding.  Pt denies questions/concerns.  LC offered advice on breastfeeding or drying up milk supply based on what the pt desires and she asked for advice on drying up milk supply.  Reviewed breast care to encouraged drying milk.

## 2018-08-25 NOTE — PROGRESS NOTES
CC: Patient is postoperative day number 4 status post primary .  S: Patient without complaints.  No events overnight.  She is tolerating a regular diet.  Passing flatus.  She has had a bowel movement.  She is ambulating and voiding without difficulty.  She is bottle feeding.    O:   Vitals:    18 0752 18 1550 18 2300 18 0730   BP: 111/73 103/66 128/82 124/81   BP Location: Right arm Right arm Left arm Right arm   Patient Position: Sitting Sitting Sitting Sitting   Pulse: 84 73 89 86   Resp:    Temp: 98.1 °F (36.7 °C) 98.1 °F (36.7 °C) 98.9 °F (37.2 °C) 98.6 °F (37 °C)   TempSrc: Oral Oral Oral Oral   SpO2:       Weight:       Height:       General: No acute distress, awake and oriented ×3  Fundus: Firm, nontender, below the umbilicus  Abdomen: Soft, nontender, nondistended  Incision: Clean, dry, intact with sutures  Extremities: No calf tenderness, no Homans sign, trace lower extremity edema    Rh+, rubella immune, male infant status post circumcision          Invalid input(s): POTASSIUM, LABRCNTIP, CRCL, LABRCNTIP      CrCl cannot be calculated (Patient's most recent lab result is older than the maximum 30 days allowed.).    Results from last 7 days  Lab Units 18  0422 18  0549   WBC 10*3/mm3 11.68* 11.41*   HEMOGLOBIN g/dL 8.8* 9.6*   PLATELETS 10*3/mm3 210 251             Scheduled Meds:  DULoxetine 20 mg Oral Daily   enoxaparin 40 mg Subcutaneous Q24H   medroxyPROGESTERone 150 mg Intramuscular Once   prenatal (CLASSIC) vitamin 1 tablet Oral Daily     Continuous Infusions:   PRN Meds:.•  acetaminophen  •  acetaminophen  •  all purpose nipple ointment  •  calcium carbonate  •  diphenhydrAMINE **OR** diphenhydrAMINE **OR** diphenhydrAMINE  •  diphenhydrAMINE  •  docusate sodium  •  hydrocortisone  •  hydrOXYzine  •  ibuprofen  •  Morphine  •  ondansetron  •  ondansetron  •  ondansetron  •  oxyCODONE-acetaminophen  •  promethazine  •  promethazine  •  zolpidem      Assessment:  1.  18-year-old  2 para 2 status post primary low transverse  delivery, postoperative day #4, doing well  2.  Factor V Leiden mutation, on prophylactic Lovenox  3.  History of postpartum depression, currently on Cymbalta and doing well  4.  Desires contraception with Depo-Provera  5.  Anemia of pregnancy, delivered, asymptomatic    Plan:  1.  Patient is meeting all milestones for discharge at this time.  Extensive discharge instructions given to the patient who verbalized understanding.  She will receive a Depo-Provera prior to discharge as per her request.  Given her factor V Leiden mutation and obesity, is recommended that she take prophylactic Lovenox daily for 6 weeks.  This will be prescribed.  We will also send her home on Cymbalta.  She will need follow-up with Dr. Noel in 2 weeks.

## 2018-08-25 NOTE — PLAN OF CARE
Problem: Patient Care Overview  Goal: Plan of Care Review  Outcome: Ongoing (interventions implemented as appropriate)   18   Coping/Psychosocial   Plan of Care Reviewed With patient;significant other   Plan of Care Review   Progress improving   OTHER   Outcome Summary appears stable. pain controlled with po meds. has been followed by all appropriate resources/agencies (CPS, Access, .s) Significant other in room at present. No c/o or concerns voiced. No questions at this time.        Problem: Suicide Risk (Adult)  Goal: Identify Related Risk Factors and Signs and Symptoms  Outcome: Ongoing (interventions implemented as appropriate)   18   Suicide Risk (Adult)   Related Risk Factors (Suicide Risk) previous suicide attempt   Signs and Symptoms (Suicide Risk) labile/flat affect       Problem: Postpartum ( Delivery) (Adult,Obstetrics,Pediatric)  Goal: Signs and Symptoms of Listed Potential Problems Will be Absent, Minimized or Managed (Postpartum)  Outcome: Ongoing (interventions implemented as appropriate)

## 2018-09-10 ENCOUNTER — OFFICE VISIT (OUTPATIENT)
Dept: OBSTETRICS AND GYNECOLOGY | Facility: CLINIC | Age: 18
End: 2018-09-10

## 2018-09-10 VITALS
WEIGHT: 176.8 LBS | SYSTOLIC BLOOD PRESSURE: 126 MMHG | HEART RATE: 83 BPM | BODY MASS INDEX: 31.33 KG/M2 | DIASTOLIC BLOOD PRESSURE: 71 MMHG

## 2018-09-10 DIAGNOSIS — Z30.42 ENCOUNTER FOR SURVEILLANCE OF INJECTABLE CONTRACEPTIVE: ICD-10-CM

## 2018-09-10 PROCEDURE — 99213 OFFICE O/P EST LOW 20 MIN: CPT | Performed by: OBSTETRICS & GYNECOLOGY

## 2018-09-10 RX ORDER — MEDROXYPROGESTERONE ACETATE 150 MG/ML
150 INJECTION, SUSPENSION INTRAMUSCULAR
Qty: 1 ML | Refills: 3 | Status: SHIPPED | OUTPATIENT
Start: 2018-09-10 | End: 2019-01-02

## 2018-09-10 NOTE — PROGRESS NOTES
Subjective   Sweetie Hernandez is a 18 y.o. female here for incision check    History of Present Illness   Patient is 3 weeks status post primary  section at term for history of shoulder dystocia.  She reports that she has been doing well since surgery.  She has no complaints today.  She is bottle feeding.  She states she is not requiring any pain medication.  She is voiding without difficulty and having normal bowel movements.  Patient is currently on Cymbalta for history of depression and states moods have been stable.    The following portions of the patient's history were reviewed and updated as appropriate: allergies, current medications, past family history, past medical history, past social history, past surgical history and problem list.    Review of Systems   Gastrointestinal: Negative for abdominal pain.   Genitourinary: Negative for pelvic pain.   All other systems reviewed and are negative.      Objective   Physical Exam   Constitutional: She appears well-developed and well-nourished.   Cardiovascular: Normal rate and regular rhythm.    Pulmonary/Chest: Effort normal and breath sounds normal.   Abdominal: Soft. She exhibits no distension. There is no tenderness.   Incision intact, clean, with no drainage.   Neurological: She is alert.   Psychiatric: She has a normal mood and affect.   Vitals reviewed.        Assessment/Plan   Sweetie was seen today for postpartum care.    Diagnoses and all orders for this visit:    Routine postpartum follow-up    Encounter for surveillance of injectable contraceptive  -     medroxyPROGESTERone (DEPO-PROVERA) 150 MG/ML injection; Inject 1 mL into the appropriate muscle as directed by prescriber Every 3 (Three) Months.    Patient's incision is healing well.  Discussed continued postoperative restrictions.  Patient received Depo-Provera on , prior to discharge.  She would like to continue on Depo-Provera.  She will follow-up in 3 weeks for postpartum physical and  will need her next depo injection at the end of November.

## 2018-09-17 ENCOUNTER — TELEPHONE (OUTPATIENT)
Dept: OBSTETRICS AND GYNECOLOGY | Facility: CLINIC | Age: 18
End: 2018-09-17

## 2018-09-17 RX ORDER — DULOXETIN HYDROCHLORIDE 20 MG/1
20 CAPSULE, DELAYED RELEASE ORAL DAILY
Qty: 30 CAPSULE | Refills: 6 | Status: SHIPPED | OUTPATIENT
Start: 2018-09-17 | End: 2020-03-02

## 2018-09-17 NOTE — TELEPHONE ENCOUNTER
Rx sent    ----- Message from Cynthia Reyer sent at 9/17/2018 10:26 AM EDT -----  Pt is requesting a refill on her DULoxetine (CYMBALTA) 20 MG capsule. RX # in chart, call back # [phone]

## 2018-10-08 ENCOUNTER — POSTPARTUM VISIT (OUTPATIENT)
Dept: OBSTETRICS AND GYNECOLOGY | Facility: CLINIC | Age: 18
End: 2018-10-08

## 2018-10-08 VITALS
BODY MASS INDEX: 30.65 KG/M2 | WEIGHT: 173 LBS | HEART RATE: 69 BPM | DIASTOLIC BLOOD PRESSURE: 70 MMHG | SYSTOLIC BLOOD PRESSURE: 114 MMHG

## 2018-10-08 PROBLEM — Z30.013 ENCOUNTER FOR INITIAL PRESCRIPTION OF INJECTABLE CONTRACEPTIVE: Status: RESOLVED | Noted: 2018-08-25 | Resolved: 2018-10-08

## 2018-10-08 PROBLEM — O99.330 TOBACCO USE AFFECTING PREGNANCY, ANTEPARTUM: Status: RESOLVED | Noted: 2018-01-29 | Resolved: 2018-10-08

## 2018-10-08 PROBLEM — O09.299 HX OF PREECLAMPSIA, PRIOR PREGNANCY, CURRENTLY PREGNANT: Status: RESOLVED | Noted: 2018-01-29 | Resolved: 2018-10-08

## 2018-10-08 PROBLEM — O09.299 H/O SHOULDER DYSTOCIA IN PRIOR PREGNANCY, CURRENTLY PREGNANT: Status: RESOLVED | Noted: 2018-08-21 | Resolved: 2018-10-08

## 2018-10-08 PROCEDURE — 99213 OFFICE O/P EST LOW 20 MIN: CPT | Performed by: OBSTETRICS & GYNECOLOGY

## 2018-10-08 NOTE — PROGRESS NOTES
Subjective   Sweetie Hernandez is a 18 y.o. female here for 6 week post partum exam.     History of Present Illness   Patient is a 18-year-old female who is 6 weeks status post primary  section at term for history of shoulder dystocia.  Patient reports that she is doing well.  She denies any pain.  She has bottlefeeding only.  She is having normal bowel movements and voiding without difficulty.  Patient received a Depo-Provera shot prior to leaving the hospital.  She is planning to continue on the depo shot.  She is currently on Cymbalta for history of depression and is also seeing a counselor.  She states that her moods are stable.    The following portions of the patient's history were reviewed and updated as appropriate: allergies, current medications, past family history, past medical history, past social history, past surgical history and problem list.    Review of Systems   Gastrointestinal: Negative for abdominal pain.   Genitourinary: Negative for pelvic pain.   All other systems reviewed and are negative.      Objective   Physical Exam  Physical Exam   Constitutional: She appears well-developed and well-nourished.   Cardiovascular: Normal rate and regular rhythm.    Pulmonary/Chest: Effort normal and breath sounds normal. Right breast exhibits no inverted nipple, no mass, no nipple discharge, no skin change and no tenderness. Left breast exhibits no inverted nipple, no mass, no nipple discharge, no skin change and no tenderness.   Abdominal: Soft. She exhibits no distension. There is no tenderness.  Incision well healed.  Genitourinary: Vagina normal and uterus normal. There is no rash, tenderness, lesion or injury on the right labia. There is no rash, tenderness, lesion or injury on the left labia. Cervix exhibits no motion tenderness, no discharge and no friability. Right adnexum displays no mass, no tenderness and no fullness. Left adnexum displays no mass, no tenderness and no fullness.    Neurological: She is alert.   Psychiatric: She has a normal mood and affect.   Vitals reviewed.      Assessment/Plan   Sweetie was seen today for postpartum care.    Diagnoses and all orders for this visit:    Routine postpartum follow-up    Patient is doing well postpartum.  She is to continue Depo-Provera injections every 12 weeks.  She may follow-up in 1 year for next annual exam or sooner if needed.

## 2018-10-26 ENCOUNTER — OFFICE VISIT CONVERTED (OUTPATIENT)
Dept: FAMILY MEDICINE CLINIC | Facility: CLINIC | Age: 18
End: 2018-10-26
Attending: NURSE PRACTITIONER

## 2018-10-26 ENCOUNTER — TRANSCRIBE ORDERS (OUTPATIENT)
Dept: ADMINISTRATIVE | Facility: HOSPITAL | Age: 18
End: 2018-10-26

## 2018-10-26 DIAGNOSIS — R10.11 RUQ PAIN: Primary | ICD-10-CM

## 2018-11-02 ENCOUNTER — HOSPITAL ENCOUNTER (OUTPATIENT)
Dept: ULTRASOUND IMAGING | Facility: HOSPITAL | Age: 18
Discharge: HOME | End: 2018-11-02
Admitting: NURSE PRACTITIONER

## 2018-11-02 DIAGNOSIS — R10.11 RUQ PAIN: ICD-10-CM

## 2018-11-02 PROCEDURE — 76705 ECHO EXAM OF ABDOMEN: CPT

## 2018-11-13 ENCOUNTER — OFFICE VISIT CONVERTED (OUTPATIENT)
Dept: FAMILY MEDICINE CLINIC | Facility: CLINIC | Age: 18
End: 2018-11-13
Attending: NURSE PRACTITIONER

## 2018-11-19 ENCOUNTER — CLINICAL SUPPORT (OUTPATIENT)
Dept: OBSTETRICS AND GYNECOLOGY | Facility: CLINIC | Age: 18
End: 2018-11-19

## 2018-11-19 DIAGNOSIS — Z30.42 ENCOUNTER FOR MANAGEMENT AND INJECTION OF DEPO-PROVERA: Primary | ICD-10-CM

## 2018-11-19 PROCEDURE — 96372 THER/PROPH/DIAG INJ SC/IM: CPT | Performed by: OBSTETRICS & GYNECOLOGY

## 2018-11-19 RX ORDER — MEDROXYPROGESTERONE ACETATE 150 MG/ML
150 INJECTION, SUSPENSION INTRAMUSCULAR ONCE
Status: COMPLETED | OUTPATIENT
Start: 2018-11-19 | End: 2018-11-19

## 2018-11-19 RX ADMIN — MEDROXYPROGESTERONE ACETATE 150 MG: 150 INJECTION, SUSPENSION INTRAMUSCULAR at 13:51

## 2018-11-19 NOTE — PROGRESS NOTES
CC: Pt here for Depo injection    Lot#:[id number]  Exp:01/2021  NDC:[address]-7    Pt tolerated injection

## 2018-12-10 ENCOUNTER — LAB (OUTPATIENT)
Dept: LAB | Facility: HOSPITAL | Age: 18
End: 2018-12-10

## 2018-12-10 ENCOUNTER — CONSULT (OUTPATIENT)
Dept: ONCOLOGY | Facility: CLINIC | Age: 18
End: 2018-12-10

## 2018-12-10 VITALS
HEIGHT: 64 IN | DIASTOLIC BLOOD PRESSURE: 82 MMHG | OXYGEN SATURATION: 97 % | RESPIRATION RATE: 16 BRPM | HEART RATE: 59 BPM | TEMPERATURE: 100 F | BODY MASS INDEX: 28.87 KG/M2 | SYSTOLIC BLOOD PRESSURE: 124 MMHG | WEIGHT: 169.1 LBS

## 2018-12-10 DIAGNOSIS — Z30.018 HORMONAL CONTRACEPTIVE: ICD-10-CM

## 2018-12-10 DIAGNOSIS — D50.0 IRON DEFICIENCY ANEMIA DUE TO CHRONIC BLOOD LOSS: ICD-10-CM

## 2018-12-10 DIAGNOSIS — D68.2 FACTOR V DEFICIENCY (HCC): Primary | ICD-10-CM

## 2018-12-10 DIAGNOSIS — D68.51 FACTOR 5 LEIDEN MUTATION, HETEROZYGOUS (HCC): Primary | ICD-10-CM

## 2018-12-10 LAB
BASOPHILS # BLD AUTO: 0.04 10*3/MM3 (ref 0–0.1)
BASOPHILS NFR BLD AUTO: 0.6 % (ref 0–1.1)
DEPRECATED RDW RBC AUTO: 40.9 FL (ref 37–49)
EOSINOPHIL # BLD AUTO: 0.33 10*3/MM3 (ref 0–0.36)
EOSINOPHIL NFR BLD AUTO: 4.8 % (ref 1–5)
ERYTHROCYTE [DISTWIDTH] IN BLOOD BY AUTOMATED COUNT: 14 % (ref 11.7–14.5)
HCT VFR BLD AUTO: 39.5 % (ref 34–45)
HGB BLD-MCNC: 13.1 G/DL (ref 11.5–14.9)
IMM GRANULOCYTES # BLD: 0.05 10*3/MM3 (ref 0–0.03)
IMM GRANULOCYTES NFR BLD: 0.7 % (ref 0–0.5)
LYMPHOCYTES # BLD AUTO: 2.05 10*3/MM3 (ref 1–3.5)
LYMPHOCYTES NFR BLD AUTO: 30 % (ref 20–49)
MCH RBC QN AUTO: 27.1 PG (ref 27–33)
MCHC RBC AUTO-ENTMCNC: 33.2 G/DL (ref 32–35)
MCV RBC AUTO: 81.8 FL (ref 83–97)
MONOCYTES # BLD AUTO: 0.47 10*3/MM3 (ref 0.25–0.8)
MONOCYTES NFR BLD AUTO: 6.9 % (ref 4–12)
NEUTROPHILS # BLD AUTO: 3.9 10*3/MM3 (ref 1.5–7)
NEUTROPHILS NFR BLD AUTO: 57 % (ref 39–75)
NRBC BLD MANUAL-RTO: 0 /100 WBC (ref 0–0)
PLATELET # BLD AUTO: 279 10*3/MM3 (ref 150–375)
PMV BLD AUTO: 10.9 FL (ref 8.9–12.1)
RBC # BLD AUTO: 4.83 10*6/MM3 (ref 3.9–5)
WBC NRBC COR # BLD: 6.84 10*3/MM3 (ref 4–10)

## 2018-12-10 PROCEDURE — 85025 COMPLETE CBC W/AUTO DIFF WBC: CPT | Performed by: INTERNAL MEDICINE

## 2018-12-10 PROCEDURE — 99205 OFFICE O/P NEW HI 60 MIN: CPT | Performed by: INTERNAL MEDICINE

## 2018-12-10 PROCEDURE — 36415 COLL VENOUS BLD VENIPUNCTURE: CPT | Performed by: INTERNAL MEDICINE

## 2018-12-10 RX ORDER — FERROUS SULFATE 325(65) MG
325 TABLET ORAL EVERY OTHER DAY
Start: 2018-12-10 | End: 2019-03-04

## 2018-12-11 ENCOUNTER — TELEPHONE (OUTPATIENT)
Dept: OBSTETRICS AND GYNECOLOGY | Facility: CLINIC | Age: 18
End: 2018-12-11

## 2018-12-11 NOTE — TELEPHONE ENCOUNTER
----- Message from Jeff Corbett CMA sent at 12/11/2018  1:44 PM EST -----  Patients benefits came back.   Device in in my cabinet.  She can be scheduled.

## 2018-12-11 NOTE — TELEPHONE ENCOUNTER
Per Jeff, pt signed consent for for Nexplanon and we have a Nexplanon device for her. If this is what she is wanting she can schedule when she is on her period or 2 weeks abstinent. If Paragard is what she wants instead, she will need to come sign a new consent form. I left a message with a female to have her call me back.

## 2018-12-11 NOTE — TELEPHONE ENCOUNTER
Pt called to see what the status was of her [employer] benefits. Her phone hasn't been working so she listed another telephone number we can reach her at.

## 2019-01-02 ENCOUNTER — PROCEDURE VISIT (OUTPATIENT)
Dept: OBSTETRICS AND GYNECOLOGY | Facility: CLINIC | Age: 19
End: 2019-01-02

## 2019-01-02 VITALS
DIASTOLIC BLOOD PRESSURE: 70 MMHG | BODY MASS INDEX: 31.1 KG/M2 | HEIGHT: 62 IN | HEART RATE: 68 BPM | SYSTOLIC BLOOD PRESSURE: 112 MMHG | WEIGHT: 169 LBS

## 2019-01-02 DIAGNOSIS — Z30.017 NEXPLANON INSERTION: Primary | ICD-10-CM

## 2019-01-02 LAB
B-HCG UR QL: NEGATIVE
INTERNAL NEGATIVE CONTROL: NEGATIVE
INTERNAL POSITIVE CONTROL: POSITIVE
Lab: NORMAL

## 2019-01-02 PROCEDURE — 11981 INSERTION DRUG DLVR IMPLANT: CPT | Performed by: OBSTETRICS & GYNECOLOGY

## 2019-01-02 PROCEDURE — 81025 URINE PREGNANCY TEST: CPT | Performed by: OBSTETRICS & GYNECOLOGY

## 2019-01-02 NOTE — PROGRESS NOTES
Nexplanon Insertion:    Pre Dx: 1) Nexplanon Insertion   Post Dx: 1) Nexplanon Insertion     Risks, benefits, alternatives explained. All questions answered. Consents signed.LOT# [id number], exp 11/2020. NDC# 3242-3392-61  Patient placed on examined table. Nexplanon to be inserted into nondominant arm. (right)The area for insertion prepped with betadine in a sterile fashion. About 3 mL of 1% lidocaine.     The insertion site was identified approximately 6-8 cm proximal to the elbow crease in the underside of the upper arm overlying the groove between the biceps and triceps muscles. The skin at the insertion site was stretched by my thumb and index finger. Then inserted the needle tip, bevel side up, at an angle not greater than 20° to the skin surface, just until the skin was penetrated. The applicator was then lowered so that it was parallel to the arm. To minimize the chance of deep incision, the skin was tented upwards with the tip of the needle. The needle was then gently inserted to the full length. Then fixed the device in place on the arm with one hand. I then slowly and carefully retracted the needle cannula back along the length of the device after pushing the release button. The obturator was seen in the device to determine that the nexplanon had been released.     Both the patient and I were easily able to feel the device under the skin. Steri-Strips were applied at the site, and the arm was gently wrapped with gauze.    There were no complications.   The patient tolerated the procedure well.    She was given a reminder card. She was advised to use condoms as a backup method for at least a week after insertion.    Expectations, warning signs and limitations reviewed.     Kelvin Strong MD  1/2/2019  2:55 PM

## 2019-01-28 ENCOUNTER — TRANSCRIBE ORDERS (OUTPATIENT)
Dept: ADMINISTRATIVE | Facility: HOSPITAL | Age: 19
End: 2019-01-28

## 2019-01-28 ENCOUNTER — OFFICE VISIT CONVERTED (OUTPATIENT)
Dept: FAMILY MEDICINE CLINIC | Facility: CLINIC | Age: 19
End: 2019-01-28
Attending: NURSE PRACTITIONER

## 2019-01-28 DIAGNOSIS — Z87.820 HISTORY OF CONCUSSION: ICD-10-CM

## 2019-01-28 DIAGNOSIS — R51.9 DAILY HEADACHE: Primary | ICD-10-CM

## 2019-01-31 ENCOUNTER — HOSPITAL ENCOUNTER (OUTPATIENT)
Dept: CT IMAGING | Facility: HOSPITAL | Age: 19
Discharge: HOME | End: 2019-01-31
Admitting: NURSE PRACTITIONER

## 2019-01-31 DIAGNOSIS — Z87.820 HISTORY OF CONCUSSION: ICD-10-CM

## 2019-01-31 DIAGNOSIS — R51.9 DAILY HEADACHE: ICD-10-CM

## 2019-01-31 PROCEDURE — 70450 CT HEAD/BRAIN W/O DYE: CPT

## 2019-03-04 ENCOUNTER — OFFICE VISIT (OUTPATIENT)
Dept: OBSTETRICS AND GYNECOLOGY | Facility: CLINIC | Age: 19
End: 2019-03-04

## 2019-03-04 VITALS
DIASTOLIC BLOOD PRESSURE: 74 MMHG | WEIGHT: 174.6 LBS | BODY MASS INDEX: 31.93 KG/M2 | SYSTOLIC BLOOD PRESSURE: 124 MMHG | HEART RATE: 74 BPM

## 2019-03-04 DIAGNOSIS — Z30.013 ENCOUNTER FOR INITIAL PRESCRIPTION OF INJECTABLE CONTRACEPTIVE: Primary | ICD-10-CM

## 2019-03-04 DIAGNOSIS — Z30.46 NEXPLANON REMOVAL: ICD-10-CM

## 2019-03-04 PROCEDURE — 99213 OFFICE O/P EST LOW 20 MIN: CPT | Performed by: OBSTETRICS & GYNECOLOGY

## 2019-03-04 PROCEDURE — 11982 REMOVE DRUG IMPLANT DEVICE: CPT | Performed by: OBSTETRICS & GYNECOLOGY

## 2019-03-04 RX ORDER — MEDROXYPROGESTERONE ACETATE 150 MG/ML
150 INJECTION, SUSPENSION INTRAMUSCULAR
Qty: 1 ML | Refills: 3 | Status: SHIPPED | OUTPATIENT
Start: 2019-03-04 | End: 2019-08-21

## 2019-03-04 NOTE — PROGRESS NOTES
Subjective   Sweetie Hernandez is a 18 y.o. female here for Nexplanon issues.  Pt c/o  weight gain and hormone levels are irregular     History of Present Illness   Patient is a 18-year-old that presents complaining of mood swings with her Nexplanon.  Patient had it placed in January.  Prior to the Nexplanon, she had been on Depo-Provera and has tolerated it well with no side effects.  Patient has a history of depression with suicidal attempts in the past.  Patient states she is concerned that her mood swings related to her Nexplanon will cause her to relapse with her depression.    The following portions of the patient's history were reviewed and updated as appropriate: allergies, current medications, past family history, past medical history, past social history, past surgical history and problem list.    Review of Systems   Psychiatric/Behavioral: Positive for depressed mood. Negative for suicidal ideas.   All other systems reviewed and are negative.      Objective   Physical Exam   Constitutional: She appears well-developed and well-nourished.   Musculoskeletal:   Nexplanon in place in right upper extremity   Neurological: She is alert.   Psychiatric: She has a normal mood and affect.   Vitals reviewed.        Assessment/Plan   Sweetie was seen today for follow-up.    Diagnoses and all orders for this visit:    Encounter for initial prescription of injectable contraceptive  -     medroxyPROGESTERone (DEPO-PROVERA) 150 MG/ML injection; Inject 1 mL into the appropriate muscle as directed by prescriber Every 3 (Three) Months.    Nexplanon removal    Patient would like to restart Depo-Provera and have her Nexplanon removed.  She had no side effects with the Depo-Provera and denied any worsening of her moods.  Her Nexplanon was removed today and she will return this week for her first elbow injection.  She may follow-up in October for annual exam or sooner if needed.    Procedure: Nexplanon removal  Preoperative  diagnosis: Desires removal  Postoperative diagnosis: Same  Indications: Patient desires removal of her Nexplanon due to worsening depression since it was inserted.  Anesthesia: 1% lidocaine with epinephrine  Pathology: None  Estimated blood loss: Less than 5 mL  Complications: None    Procedure in detail:  The risks, benefits, and alternatives to remove the device have been discussed with the patient at length. All of her questions are answered. She is aware of the need for alternative means of contraception if desired. Verbal informed consent is obtained.  Able to easily palpate the device in the nondominant right arm. Additional imaging was not required. The device feels freely mobile and easily accessible. She was placed in the examining table in a supine position with her left arm elevated at her side. The site at the distal tip of the device is marked with a pen. The skin over this site is prepped with Betadine. 5 mL of 1% lidocaine with epinephrine was injected.  Downward pressure was applied on the end of the implant nearest the axilla, and a 2-3 mm incision was made in a longitudinal direction of the arm at the tip of the implant closest to the elbow. The implant was then pushed gently toward the incision until the tip was visible. The fibrous capsule was opened with blunt dissection using a hemostat. The implant was grasp with a hemostat and was easily removed intact. It measured a  full 4 cm in length.  The skin was cleansed and dried. A Steri-Strip was applied. The arm was gently wrapped with Kerlex gauze. The patient had normal strength and sensation in her left extremity. There was no significant bleeding. There were no complications.  The patient tolerated the procedure well.  The patient was advised about proper care of the dressings. She was advised to call or return for complications such as fever, signs of infection, increased pain, or any other concerns.

## 2019-04-03 ENCOUNTER — OFFICE VISIT CONVERTED (OUTPATIENT)
Dept: FAMILY MEDICINE CLINIC | Facility: CLINIC | Age: 19
End: 2019-04-03
Attending: NURSE PRACTITIONER

## 2019-04-09 ENCOUNTER — OFFICE VISIT CONVERTED (OUTPATIENT)
Dept: SURGERY | Facility: CLINIC | Age: 19
End: 2019-04-09
Attending: SURGERY

## 2019-04-09 ENCOUNTER — CONVERSION ENCOUNTER (OUTPATIENT)
Dept: SURGERY | Facility: CLINIC | Age: 19
End: 2019-04-09

## 2019-04-12 ENCOUNTER — OFFICE VISIT CONVERTED (OUTPATIENT)
Dept: FAMILY MEDICINE CLINIC | Facility: CLINIC | Age: 19
End: 2019-04-12
Attending: NURSE PRACTITIONER

## 2019-04-12 ENCOUNTER — CONVERSION ENCOUNTER (OUTPATIENT)
Dept: FAMILY MEDICINE CLINIC | Facility: CLINIC | Age: 19
End: 2019-04-12

## 2019-04-12 ENCOUNTER — HOSPITAL ENCOUNTER (OUTPATIENT)
Dept: FAMILY MEDICINE CLINIC | Facility: CLINIC | Age: 19
Discharge: HOME | End: 2019-04-12
Attending: NURSE PRACTITIONER

## 2019-04-12 LAB
BASOPHILS # BLD AUTO: 0.04 10*3/UL (ref 0–0.2)
BASOPHILS NFR BLD AUTO: 0.5 % (ref 0–3)
CONV ABS IMM GRAN: 0.02 10*3/UL (ref 0–0.2)
CONV IMMATURE GRAN: 0.3 % (ref 0–1.8)
DEPRECATED RDW RBC AUTO: 41.3 FL (ref 36.4–46.3)
EOSINOPHIL # BLD AUTO: 0.4 10*3/UL (ref 0–0.7)
EOSINOPHIL # BLD AUTO: 5.1 % (ref 0–7)
ERYTHROCYTE [DISTWIDTH] IN BLOOD BY AUTOMATED COUNT: 13 % (ref 11.7–14.4)
FERRITIN SERPL-MCNC: 18 NG/ML (ref 10–200)
HBA1C MFR BLD: 14 G/DL (ref 12–16)
HCT VFR BLD AUTO: 43 % (ref 37–47)
IRON SATN MFR SERPL: 14 % (ref 20–55)
IRON SERPL-MCNC: 66 UG/DL (ref 60–170)
LYMPHOCYTES # BLD AUTO: 2.32 10*3/UL (ref 1–5)
MCH RBC QN AUTO: 28.5 PG (ref 27–31)
MCHC RBC AUTO-ENTMCNC: 32.6 G/DL (ref 33–37)
MCV RBC AUTO: 87.6 FL (ref 81–99)
MONOCYTES # BLD AUTO: 0.53 10*3/UL (ref 0.2–1.2)
MONOCYTES NFR BLD AUTO: 6.7 % (ref 3–10)
NEUTROPHILS # BLD AUTO: 4.58 10*3/UL (ref 2–8)
NEUTROPHILS NFR BLD AUTO: 58 % (ref 30–85)
NRBC CBCN: 0 % (ref 0–0.7)
PLATELET # BLD AUTO: 248 10*3/UL (ref 130–400)
PMV BLD AUTO: 11.1 FL (ref 9.4–12.3)
RBC # BLD AUTO: 4.91 10*6/UL (ref 4.2–5.4)
TIBC SERPL-MCNC: 458 UG/DL (ref 245–450)
TRANSFERRIN SERPL-MCNC: 320 MG/DL (ref 250–380)
VARIANT LYMPHS NFR BLD MANUAL: 29.4 % (ref 20–45)
WBC # BLD AUTO: 7.89 10*3/UL (ref 4.8–10.8)

## 2019-04-15 ENCOUNTER — TRANSCRIBE ORDERS (OUTPATIENT)
Dept: ADMINISTRATIVE | Facility: HOSPITAL | Age: 19
End: 2019-04-15

## 2019-04-15 DIAGNOSIS — N92.6 IRREGULAR MENSTRUAL CYCLE: Primary | ICD-10-CM

## 2019-04-17 ENCOUNTER — HOSPITAL ENCOUNTER (OUTPATIENT)
Dept: SURGERY | Facility: HOSPITAL | Age: 19
Setting detail: HOSPITAL OUTPATIENT SURGERY
Discharge: HOME | End: 2019-04-17
Attending: SURGERY

## 2019-04-19 ENCOUNTER — TRANSCRIBE ORDERS (OUTPATIENT)
Dept: ADMINISTRATIVE | Facility: HOSPITAL | Age: 19
End: 2019-04-19

## 2019-04-19 DIAGNOSIS — N92.6 IRREGULAR MENSTRUAL CYCLE: Primary | ICD-10-CM

## 2019-04-22 ENCOUNTER — HOSPITAL ENCOUNTER (OUTPATIENT)
Dept: ULTRASOUND IMAGING | Facility: HOSPITAL | Age: 19
Discharge: HOME | End: 2019-04-22
Admitting: NURSE PRACTITIONER

## 2019-04-22 DIAGNOSIS — N92.6 IRREGULAR MENSTRUAL CYCLE: ICD-10-CM

## 2019-04-22 PROCEDURE — 76830 TRANSVAGINAL US NON-OB: CPT

## 2019-04-22 PROCEDURE — 76857 US EXAM PELVIC LIMITED: CPT

## 2019-04-26 NOTE — PROGRESS NOTES
Subjective     REASON FOR CONSULTATION:  Provide an opinion on any further workup or treatment on:    Factor V Leiden mutation                       REQUESTING PHYSICIAN: Adelina Ortiz A*    RECORDS OBTAINED: Records of the patients history including those obtained from the referring provider were reviewed and summarized in detail.    HISTORY OF PRESENT ILLNESS:    JACQUES Hernandez is a 18 y.o. patient who was referred for evaluation of factor V Leiden mutation.  She was diagnosed with a factor V Leiden mutation at the age of 9.  She started having menstrual cycles at the age of 8.  Due to family history of factor V Leiden, she had the test done at the age of 9.  She had problems related to her menstrual cycles with heavy bleeding early on.  She was placed on oral contraceptives for a brief period of time.  She was placed on Depo-Provera at some point.      Patient began pregnant in  and delivered in 2017.  He had preeclampsia.  She delivered vaginally on 17.  She was placed on Lovenox prophylactically for 3 days afterwards.  She did not have problems with thrombosis.  She was placed on Pristiq and she took birth control pills.  However, she had postpartum depression and had suicidal ideations.    The patient became pregnant again in late .  The delivered via  on 2018.  She did well.  She was placed on Lovenox prophylactically for 6 weeks and did not have problems with bleeding.  She was placed on Cymbalta.     The patient desired contraception after she gave birth.  She was given Depo-Provera in 2018 and she received a second dose of 2018.  The menstrual cycles stopped in 2018.      Patient reports fatigue.  She reports occasional dizziness.  No lower extremity pain or swelling.    Patient continues to smoke.      REVIEW OF SYSTEMS:  Review of Systems   Constitutional: Positive for appetite change and fatigue. Negative for chills, fever and  unexpected weight change.   HENT: Negative for mouth sores, nosebleeds, sore throat and voice change.    Eyes: Negative for visual disturbance.   Respiratory: Positive for cough. Negative for shortness of breath.    Cardiovascular: Positive for chest pain. Negative for leg swelling.   Gastrointestinal: Positive for abdominal pain and nausea. Negative for blood in stool, constipation, diarrhea and vomiting.   Endocrine: Positive for cold intolerance.   Genitourinary: Negative for dysuria, frequency and hematuria.   Musculoskeletal: Positive for back pain. Negative for arthralgias and joint swelling.   Skin: Negative for rash.   Neurological: Positive for dizziness and headaches. Negative for numbness.   Hematological: Negative for adenopathy. Does not bruise/bleed easily.   Psychiatric/Behavioral: Negative for dysphoric mood. The patient is not nervous/anxious.        Past Medical History:   Diagnosis Date   • Anxiety    • Bipolar disorder (CMS/[facility])    • Depression     meds prior to pregnancy   • Factor V Leiden (CMS/[facility])    • Preeclampsia    • Seasonal allergies    • Trichimoniasis    • Urinary tract infection    • Urogenital trichomoniasis        Past Surgical History:   Procedure Laterality Date   • ADENOIDECTOMY     • COLONOSCOPY     • EYE SURGERY     • TONSILLECTOMY         Social History     Socioeconomic History   • Marital status:      Spouse name: Not on file   • Number of children: Not on file   • Years of education: Not on file   • Highest education level: Not on file   Social Needs   • Financial resource strain: Not on file   • Food insecurity - worry: Not on file   • Food insecurity - inability: Not on file   • Transportation needs - medical: Not on file   • Transportation needs - non-medical: Not on file   Occupational History     Employer: [employer]   Tobacco Use   • Smoking status: Current Every Day Smoker     Packs/day: 1.00     Types: Cigarettes     Last attempt to quit:  "10/21/2016     Years since quittin.1   • Smokeless tobacco: Never Used   Substance and Sexual Activity   • Alcohol use: No   • Drug use: No   • Sexual activity: Yes     Partners: Male     Birth control/protection: None   Other Topics Concern   • Not on file   Social History Narrative   • Not on file       Cancer-related family history is not on file.    MEDICATIONS:    Current Outpatient Medications:   •  DULoxetine (CYMBALTA) 20 MG capsule, Take 1 capsule by mouth Daily., Disp: 30 capsule, Rfl: 6  •  ibuprofen (ADVIL,MOTRIN) 800 MG tablet, Take 1 tablet by mouth Every 8 (Eight) Hours As Needed for Mild Pain ., Disp: 30 tablet, Rfl: 1  •  medroxyPROGESTERone (DEPO-PROVERA) 150 MG/ML injection, Inject 1 mL into the appropriate muscle as directed by prescriber Every 3 (Three) Months., Disp: 1 mL, Rfl: 3  •  docusate sodium 100 MG capsule, Take 100 mg by mouth 2 (Two) Times a Day As Needed for Constipation., Disp: 30 capsule, Rfl: 1  •  ferrous sulfate 325 (65 FE) MG tablet, Take 1 tablet by mouth Daily With Breakfast., Disp: 30 tablet, Rfl: 1  •  Prenatal Vit-Fe Fumarate-FA (PRENATAL, CLASSIC, VITAMIN) 28-0.8 MG tablet tablet, Take 1 tablet by mouth Daily., Disp: 30 tablet, Rfl: 1     ALLERGIES:  Allergies   Allergen Reactions   • Hydrocodone Anaphylaxis        Objective   VITAL SIGNS:  Vitals:    12/10/18 1514   BP: 124/82   Pulse: 59   Resp: 16   Temp: 100 °F (37.8 °C)   TempSrc: Oral   SpO2: 97%   Weight: 76.7 kg (169 lb 1.6 oz)   Height: 161.3 cm (63.5\")  Comment: new pt   PainSc:   5   PainLoc: Abdomen       Wt Readings from Last 3 Encounters:   12/10/18 76.7 kg (169 lb 1.6 oz)   10/08/18 78.5 kg (173 lb)   09/10/18 80.2 kg (176 lb 12.8 oz)       PHYSICAL EXAMINATION  GENERAL:  The patient appears in good general condition, not in acute distress.  SKIN: Warm and dry. No skin rashes, ecchymosis or petechiae.  HEAD:  Normocephalic.  EYES:  No Jaundice. No Pallor. Pupils equal. EOMI.  CHEST: Normal respiratory " effort.   CARDIAC:  No edema.  EXTREMITIES:  No clubbing. No joint swelling in the hands. No Calf tenderness.  NEUROLOGICAL:  No Focal neurological deficits.     RESULT REVIEW:   Results from last 7 days   Lab Units  12/10/18   1430   WBC 10*3/mm3  6.84   NEUTROS ABS 10*3/mm3  3.90   HEMOGLOBIN g/dL  13.1   HEMATOCRIT %  39.5   PLATELETS 10*3/mm3  279          Assessment/Plan   1.  Factor V Leiden heterozygous mutation.  The patient has no prior history of deep vein thrombosis, thrombophlebitis or pulmonary embolism. Family history is positive for thrombosis in her maternal grandfather. The patient did well through 2 pregnancies. She was given Lovenox prophylactically for 3 days after her normal vaginal delivery in 2017 and for 6 weeks after her  in 2018.    2.  The patient desires contraception.  She was initially on combination OCP.  After the delivery of her baby in 2018, she was given Depo-Provera.      I explained to the patient that with factor V Leiden mutation, her risk of developing venous thromboembolism is about 4 times the average risk.  I explained that IUD whether pain or hormone containing are considered to be the safest.  She states that she had problem when her gynecologist attempted to place the IUD.  She is deciding between having it placed under anesthesia versus using a different method of contraception.  I explained that progesterone containing treatments are considered to be associated with venous thromboembolism at the lower frequency than estrogen containing once.  I explained that Depo-Provera has advantage of having a duration of 3 months compared to other methods that may have a longer duration of action in case any reversible is needed.  She is going to consider her options and inform her gynecologist with her decision.    3.  Iron deficiency anemia. She took oral iron for about a week after she gave birth. She reports fatigue. Although her Hgb is normal, iron  stores are likely remaining low.      PLAN:    1.  Patient will inform her gynecologist with her decision about the method of contraception.      2.  We discussed smoking cessation, increasing fluid intake and avoiding sitting for prolonged periods of time. Date discussed the need to take breaks when she travels by car and the need to stretch the lower extremities to avoid stagnation of the blood in the lower extremity veins.      3.  I explained that she does not need chronic anticoagulation or antiplatelet therapy. She would need prophylactic anticoagulation during periods of increased risk as in the case of inpatient hospitalization or after major surgical procedures.    4.  I discussed symptoms of deep vein thrombosis including calf pain, leg swelling and redness/hotness of the involved area.  I told her she would need to inform us or her primary care physician or to go to emergency department immediately if she develops those symptoms.      5.  I asked her to start back on oral iron every other day for 6 weeks to replenish her iron stores.      I did not schedule her for a follow-up visit.  We'll be available to see him future if the need arises.      Myra Pierre MD  12/10/18       normal...

## 2019-07-11 ENCOUNTER — OFFICE VISIT CONVERTED (OUTPATIENT)
Dept: FAMILY MEDICINE CLINIC | Facility: CLINIC | Age: 19
End: 2019-07-11
Attending: NURSE PRACTITIONER

## 2019-07-11 ENCOUNTER — CONVERSION ENCOUNTER (OUTPATIENT)
Dept: FAMILY MEDICINE CLINIC | Facility: CLINIC | Age: 19
End: 2019-07-11

## 2019-07-17 ENCOUNTER — CONVERSION ENCOUNTER (OUTPATIENT)
Dept: FAMILY MEDICINE CLINIC | Facility: CLINIC | Age: 19
End: 2019-07-17

## 2019-07-17 ENCOUNTER — OFFICE VISIT CONVERTED (OUTPATIENT)
Dept: FAMILY MEDICINE CLINIC | Facility: CLINIC | Age: 19
End: 2019-07-17
Attending: NURSE PRACTITIONER

## 2019-08-06 ENCOUNTER — HOSPITAL ENCOUNTER (OUTPATIENT)
Dept: FAMILY MEDICINE CLINIC | Facility: CLINIC | Age: 19
Discharge: HOME | End: 2019-08-06
Attending: NURSE PRACTITIONER

## 2019-08-06 ENCOUNTER — OFFICE VISIT CONVERTED (OUTPATIENT)
Dept: FAMILY MEDICINE CLINIC | Facility: CLINIC | Age: 19
End: 2019-08-06
Attending: NURSE PRACTITIONER

## 2019-08-06 ENCOUNTER — CONVERSION ENCOUNTER (OUTPATIENT)
Dept: FAMILY MEDICINE CLINIC | Facility: CLINIC | Age: 19
End: 2019-08-06

## 2019-08-06 LAB
ALBUMIN SERPL-MCNC: 4.7 G/DL (ref 3.5–5)
ALBUMIN/GLOB SERPL: 1.6 {RATIO} (ref 1.4–2.6)
ALP SERPL-CCNC: 92 U/L (ref 42–98)
ALT SERPL-CCNC: 23 U/L (ref 10–40)
ANION GAP SERPL CALC-SCNC: 21 MMOL/L (ref 8–19)
APPEARANCE UR: CLEAR
AST SERPL-CCNC: 17 U/L (ref 15–50)
BASOPHILS # BLD AUTO: 0.05 10*3/UL (ref 0–0.2)
BASOPHILS NFR BLD AUTO: 0.6 % (ref 0–3)
BILIRUB SERPL-MCNC: <0.15 MG/DL (ref 0.2–1.3)
BILIRUB UR QL: NEGATIVE
BUN SERPL-MCNC: 13 MG/DL (ref 5–25)
BUN/CREAT SERPL: 16 {RATIO} (ref 6–20)
CALCIUM SERPL-MCNC: 10.3 MG/DL (ref 8.7–10.4)
CHLORIDE SERPL-SCNC: 102 MMOL/L (ref 99–111)
COLOR UR: YELLOW
CONV ABS IMM GRAN: 0.01 10*3/UL (ref 0–0.2)
CONV CO2: 24 MMOL/L (ref 22–32)
CONV COLLECTION SOURCE (UA): NORMAL
CONV IMMATURE GRAN: 0.1 % (ref 0–1.8)
CONV TOTAL PROTEIN: 7.7 G/DL (ref 6.3–8.2)
CONV UROBILINOGEN IN URINE BY AUTOMATED TEST STRIP: 0.2 {EHRLICHU}/DL (ref 0.1–1)
CREAT UR-MCNC: 0.79 MG/DL (ref 0.5–0.9)
DEPRECATED RDW RBC AUTO: 44.5 FL (ref 36.4–46.3)
EOSINOPHIL # BLD AUTO: 0.38 10*3/UL (ref 0–0.7)
EOSINOPHIL # BLD AUTO: 4.6 % (ref 0–7)
ERYTHROCYTE [DISTWIDTH] IN BLOOD BY AUTOMATED COUNT: 13.2 % (ref 11.7–14.4)
EST. AVERAGE GLUCOSE BLD GHB EST-MCNC: 97 MG/DL
FERRITIN SERPL-MCNC: 15 NG/ML (ref 10–200)
FOLATE SERPL-MCNC: 9 NG/ML (ref 4.8–20)
GFR SERPLBLD BASED ON 1.73 SQ M-ARVRAT: >60 ML/MIN/{1.73_M2}
GLOBULIN UR ELPH-MCNC: 3 G/DL (ref 2–3.5)
GLUCOSE SERPL-MCNC: 88 MG/DL (ref 65–99)
GLUCOSE UR QL: NEGATIVE MG/DL
HBA1C MFR BLD: 5 % (ref 3.5–5.7)
HCT VFR BLD AUTO: 45.3 % (ref 37–47)
HGB BLD-MCNC: 14.1 G/DL (ref 12–16)
HGB UR QL STRIP: NEGATIVE
IRON SATN MFR SERPL: 8 % (ref 20–55)
IRON SERPL-MCNC: 32 UG/DL (ref 60–170)
KETONES UR QL STRIP: NEGATIVE MG/DL
LEUKOCYTE ESTERASE UR QL STRIP: NEGATIVE
LYMPHOCYTES # BLD AUTO: 2.04 10*3/UL (ref 1–5)
LYMPHOCYTES NFR BLD AUTO: 24.7 % (ref 20–45)
MCH RBC QN AUTO: 29.1 PG (ref 27–31)
MCHC RBC AUTO-ENTMCNC: 31.1 G/DL (ref 33–37)
MCV RBC AUTO: 93.4 FL (ref 81–99)
MONOCYTES # BLD AUTO: 0.52 10*3/UL (ref 0.2–1.2)
MONOCYTES NFR BLD AUTO: 6.3 % (ref 3–10)
NEUTROPHILS # BLD AUTO: 5.25 10*3/UL (ref 2–8)
NEUTROPHILS NFR BLD AUTO: 63.7 % (ref 30–85)
NITRITE UR QL STRIP: NEGATIVE
NRBC CBCN: 0 % (ref 0–0.7)
OSMOLALITY SERPL CALC.SUM OF ELEC: 294 MOSM/KG (ref 273–304)
PH UR STRIP.AUTO: 6.5 [PH] (ref 5–8)
PLATELET # BLD AUTO: 216 10*3/UL (ref 130–400)
PMV BLD AUTO: 11.3 FL (ref 9.4–12.3)
POTASSIUM SERPL-SCNC: 4.5 MMOL/L (ref 3.5–5.3)
PROT UR QL: NEGATIVE MG/DL
RBC # BLD AUTO: 4.85 10*6/UL (ref 4.2–5.4)
SODIUM SERPL-SCNC: 142 MMOL/L (ref 135–147)
SP GR UR: 1.02 (ref 1–1.03)
T4 FREE SERPL-MCNC: 1.2 NG/DL (ref 0.9–1.8)
TIBC SERPL-MCNC: 426 UG/DL (ref 245–450)
TRANSFERRIN SERPL-MCNC: 298 MG/DL (ref 250–380)
TSH SERPL-ACNC: 2 M[IU]/L (ref 0.27–4.2)
VIT B12 SERPL-MCNC: 590 PG/ML (ref 211–911)
WBC # BLD AUTO: 8.25 10*3/UL (ref 4.8–10.8)

## 2019-08-20 DIAGNOSIS — D50.0 IRON DEFICIENCY ANEMIA DUE TO CHRONIC BLOOD LOSS: Primary | ICD-10-CM

## 2019-08-21 ENCOUNTER — OFFICE VISIT (OUTPATIENT)
Dept: ONCOLOGY | Facility: CLINIC | Age: 19
End: 2019-08-21

## 2019-08-21 ENCOUNTER — LAB (OUTPATIENT)
Dept: OTHER | Facility: HOSPITAL | Age: 19
End: 2019-08-21

## 2019-08-21 VITALS
OXYGEN SATURATION: 97 % | WEIGHT: 174 LBS | SYSTOLIC BLOOD PRESSURE: 133 MMHG | DIASTOLIC BLOOD PRESSURE: 82 MMHG | BODY MASS INDEX: 29.71 KG/M2 | RESPIRATION RATE: 16 BRPM | TEMPERATURE: 98.8 F | HEIGHT: 64 IN | HEART RATE: 65 BPM

## 2019-08-21 DIAGNOSIS — D50.0 IRON DEFICIENCY ANEMIA DUE TO CHRONIC BLOOD LOSS: Primary | ICD-10-CM

## 2019-08-21 DIAGNOSIS — R53.82 CHRONIC FATIGUE: ICD-10-CM

## 2019-08-21 DIAGNOSIS — D50.0 IRON DEFICIENCY ANEMIA DUE TO CHRONIC BLOOD LOSS: ICD-10-CM

## 2019-08-21 DIAGNOSIS — D68.51 FACTOR 5 LEIDEN MUTATION, HETEROZYGOUS (HCC): ICD-10-CM

## 2019-08-21 LAB
BASOPHILS # BLD AUTO: 0.04 10*3/MM3 (ref 0–0.2)
BASOPHILS NFR BLD AUTO: 0.5 % (ref 0–1.5)
DEPRECATED RDW RBC AUTO: 41.7 FL (ref 37–54)
EOSINOPHIL # BLD AUTO: 0.36 10*3/MM3 (ref 0–0.4)
EOSINOPHIL NFR BLD AUTO: 4.1 % (ref 0.3–6.2)
ERYTHROCYTE [DISTWIDTH] IN BLOOD BY AUTOMATED COUNT: 12.8 % (ref 12.3–15.4)
FERRITIN SERPL-MCNC: 15.8 NG/ML (ref 13–150)
FOLATE SERPL-MCNC: 12.8 NG/ML (ref 4.78–24.2)
HCT VFR BLD AUTO: 40.4 % (ref 34–46.6)
HGB BLD-MCNC: 13.3 G/DL (ref 12–15.9)
HGB RETIC QN AUTO: 31.5 PG (ref 29.8–36.1)
IMM GRANULOCYTES # BLD AUTO: 0.02 10*3/MM3 (ref 0–0.05)
IMM GRANULOCYTES NFR BLD AUTO: 0.2 % (ref 0–0.5)
IMM RETICS NFR: 6 % (ref 3–15.8)
IRON 24H UR-MRATE: 51 MCG/DL (ref 37–145)
IRON SATN MFR SERPL: 11 % (ref 20–50)
LYMPHOCYTES # BLD AUTO: 2.22 10*3/MM3 (ref 0.7–3.1)
LYMPHOCYTES NFR BLD AUTO: 25.1 % (ref 19.6–45.3)
MCH RBC QN AUTO: 29.3 PG (ref 26.6–33)
MCHC RBC AUTO-ENTMCNC: 32.9 G/DL (ref 31.5–35.7)
MCV RBC AUTO: 89 FL (ref 79–97)
MONOCYTES # BLD AUTO: 0.51 10*3/MM3 (ref 0.1–0.9)
MONOCYTES NFR BLD AUTO: 5.8 % (ref 5–12)
NEUTROPHILS # BLD AUTO: 5.69 10*3/MM3 (ref 1.7–7)
NEUTROPHILS NFR BLD AUTO: 64.3 % (ref 42.7–76)
NRBC BLD AUTO-RTO: 0 /100 WBC (ref 0–0.2)
PLATELET # BLD AUTO: 231 10*3/MM3 (ref 140–450)
PMV BLD AUTO: 10.9 FL (ref 6–12)
RBC # BLD AUTO: 4.54 10*6/MM3 (ref 3.77–5.28)
RETICS/RBC NFR AUTO: 1.23 % (ref 0.7–1.9)
TIBC SERPL-MCNC: 466 MCG/DL (ref 298–536)
TRANSFERRIN SERPL-MCNC: 313 MG/DL (ref 200–360)
VIT B12 BLD-MCNC: 660 PG/ML (ref 211–946)
WBC NRBC COR # BLD: 8.84 10*3/MM3 (ref 3.4–10.8)

## 2019-08-21 PROCEDURE — 85046 RETICYTE/HGB CONCENTRATE: CPT | Performed by: INTERNAL MEDICINE

## 2019-08-21 PROCEDURE — 82607 VITAMIN B-12: CPT | Performed by: INTERNAL MEDICINE

## 2019-08-21 PROCEDURE — 83540 ASSAY OF IRON: CPT | Performed by: INTERNAL MEDICINE

## 2019-08-21 PROCEDURE — 99214 OFFICE O/P EST MOD 30 MIN: CPT | Performed by: INTERNAL MEDICINE

## 2019-08-21 PROCEDURE — 82728 ASSAY OF FERRITIN: CPT | Performed by: INTERNAL MEDICINE

## 2019-08-21 PROCEDURE — 85025 COMPLETE CBC W/AUTO DIFF WBC: CPT | Performed by: INTERNAL MEDICINE

## 2019-08-21 PROCEDURE — 84466 ASSAY OF TRANSFERRIN: CPT | Performed by: INTERNAL MEDICINE

## 2019-08-21 PROCEDURE — 82746 ASSAY OF FOLIC ACID SERUM: CPT | Performed by: INTERNAL MEDICINE

## 2019-08-21 PROCEDURE — 83921 ORGANIC ACID SINGLE QUANT: CPT | Performed by: INTERNAL MEDICINE

## 2019-08-21 PROCEDURE — 36415 COLL VENOUS BLD VENIPUNCTURE: CPT

## 2019-08-21 PROCEDURE — 82533 TOTAL CORTISOL: CPT | Performed by: INTERNAL MEDICINE

## 2019-08-21 NOTE — PROGRESS NOTES
Subjective     CHIEF COMPLAINT:      Chief Complaint   Patient presents with   • Follow-up     discuss iron infusion       HISTORY OF PRESENT ILLNESS:     Sweetie Hernandez is a 19 y.o. female patient who returns today for follow up on her iron deficiency.  She reports lightheadedness and headaches.  In addition, she reports having episodes of feeling weak associated with shakiness.  The episodes developed at different times of the day and are not related to change in her position.  No dizziness with standing up.    Over the past month, patient has been having problem with progressive fatigue.  Patient cannot take oral iron as it causes her to have upset stomach.  She already has heartburn and occasional upper abdominal pain.    Patient states that an she had a subdermal Nexplanon implant placed on 1/2/2019.  She did not tolerate it with development of mood changes and hot flashes and it was removed 2 weeks after.  She did not have any hormonal contraception after its removal.  She did not tolerate placement of IUD.  Her insurance company declined coverage for placement of the IUD under anesthesia.    REVIEW OF SYSTEMS:  Review of Systems   Constitutional: Positive for fatigue. Negative for chills, fever and unexpected weight change.   HENT: Positive for nosebleeds. Negative for mouth sores, sore throat and voice change.    Eyes: Negative for visual disturbance.   Respiratory: Positive for shortness of breath. Negative for cough.    Cardiovascular: Negative for chest pain and leg swelling.   Gastrointestinal: Positive for nausea. Negative for abdominal pain, blood in stool, constipation, diarrhea and vomiting.   Genitourinary: Negative for dysuria, frequency and hematuria.   Musculoskeletal: Negative for arthralgias, back pain and joint swelling.   Skin: Negative for rash.   Neurological: Positive for light-headedness and headaches. Negative for dizziness and numbness.   Hematological: Negative for adenopathy.  "Bruises/bleeds easily.   Psychiatric/Behavioral: Negative for dysphoric mood. The patient is not nervous/anxious.      I verified the ROS obtained by the MA.      Past Medical History:   Diagnosis Date   • Anxiety    • Bipolar disorder (CMS/HCC)    • Depression     meds prior to pregnancy   • Factor V Leiden (CMS/HCC)    • Preeclampsia    • Seasonal allergies    • Trichimoniasis    • Urinary tract infection    • Urogenital trichomoniasis        Past Surgical History:   Procedure Laterality Date   • ADENOIDECTOMY     •  SECTION N/A 2018    Procedure:  SECTION PRIMARY;  Surgeon: Radha Noel MD;  Location: [facility] LABOR DELIVERY;  Service: Obstetrics/Gynecology   • COLONOSCOPY     • EYE SURGERY     • TONSILLECTOMY         Cancer-related family history includes Cancer in her maternal grandfather.  Social History     Tobacco Use   • Smoking status: Current Every Day Smoker     Packs/day: 1.00     Years: 5.00     Pack years: 5.00     Types: Cigarettes     Last attempt to quit: 10/21/2016     Years since quittin.8   • Smokeless tobacco: Never Used   Substance Use Topics   • Alcohol use: No       MEDICATIONS:    Current Outpatient Medications:   •  DULoxetine (CYMBALTA) 20 MG capsule, Take 1 capsule by mouth Daily., Disp: 30 capsule, Rfl: 6  •  FERROUS SULFATE PO, Take  by mouth., Disp: , Rfl:   •  medroxyPROGESTERone (DEPO-PROVERA) 150 MG/ML injection, Inject 1 mL into the appropriate muscle as directed by prescriber Every 3 (Three) Months., Disp: 1 mL, Rfl: 3    ALLERGIES:  Allergies   Allergen Reactions   • Hydrocodone Anaphylaxis         Objective   VITAL SIGNS:     Vitals:    19 1547   BP: 133/82   Pulse: 65   Resp: 16   Temp: 98.8 °F (37.1 °C)   TempSrc: Oral   SpO2: 97%   Weight: 78.9 kg (174 lb)   Height: 162 cm (63.78\")  Comment: new ht   PainSc: 0-No pain     Body mass index is 30.07 kg/m².     Wt Readings from Last 3 Encounters:   19 78.9 kg (174 lb)   19 79.2 kg " (174 lb 9.6 oz)   19 76.7 kg (169 lb)       PHYSICAL EXAMINATION:  GENERAL:  The patient appears in good general condition, not in acute distress.  SKIN: Warm and dry. No skin rashes. No ecchymosis.  HEAD:  Normocephalic.  EYES:  No Jaundice. No Pallor.   NECK:  Supple with Good ROM. No Thyromegaly. No Masses.  CHEST: Normal respiratory effort. Lungs clear to auscultation.   CARDIAC:   No edema.  ABDOMEN:  Soft.  Epigastric and left upper quadrant tenderness. No Hepatomegaly. No Splenomegaly. No masses.  EXTREMITIES:  No clubbing. No deforming arthritis in the hands. No Calf tenderness.  NEUROLOGICAL:  No Focal neurological deficits.         DIAGNOSTIC DATA:     Results from last 7 days   Lab Units 19  1538   WBC 10*3/mm3 8.84   NEUTROS ABS 10*3/mm3 5.69   HEMOGLOBIN g/dL 13.3   HEMATOCRIT % 40.4   PLATELETS 10*3/mm3 231         Assessment/Plan   1.  Factor V Leiden heterozygous mutation.    · The patient has no prior history of deep vein thrombosis, thrombophlebitis or pulmonary embolism.   · Family history is positive for thrombosis in her maternal grandfather.   · The patient did well with 2 pregnancies. She was given Lovenox prophylactically for 3 days after her normal vaginal delivery in 2017 and for 6 weeks after her  in 2018.  · For contraception, we recommended IUD but the patient could not have it placed.  She wanted to have Depo-Provera but did not tolerate it and did not tolerate Nexplanon implant placed earlier this year.     2.  Iron deficiency anemia.  She did not tolerate oral iron therapy.  She has GERD symptoms.  She developed upset stomach after taking the oral iron.  She is now reporting fatigue.  In addition, she reports lightheadedness, headaches and episodes of shakiness.  It is not clear if all of the symptoms are related to iron deficiency.    PLAN:    1.  I will obtain ferritin, iron panel, B12 folate and methylmalonic acid levels today.  2.  If the iron  stores are low, we will give Injectafer x2 doses.  I explained the treatment and the rare side effects of infusion reactions and she agrees to proceed if her level is low.  3.  I will obtain serum cortisol level.  4.  I will see the patient follow-up in 6 months with CBC ferritin iron panel.  If her levels are not low at present, we will repeat CBC ferritin iron panel in 3 months from now.      Myra Pierre MD  08/21/19

## 2019-08-22 ENCOUNTER — TELEPHONE (OUTPATIENT)
Dept: ONCOLOGY | Facility: HOSPITAL | Age: 19
End: 2019-08-22

## 2019-08-22 ENCOUNTER — TELEPHONE (OUTPATIENT)
Dept: GENERAL RADIOLOGY | Facility: HOSPITAL | Age: 19
End: 2019-08-22

## 2019-08-22 PROBLEM — D50.9 IRON DEFICIENCY ANEMIA: Status: ACTIVE | Noted: 2019-08-22

## 2019-08-22 PROBLEM — IMO0001 ADVERSE EFFECT OF IRON OR ITS COMPOUND, SUBSEQUENT ENCOUNTER: Status: ACTIVE | Noted: 2019-08-22

## 2019-08-22 LAB — CORTIS SERPL-MCNC: 9.26 MCG/DL

## 2019-08-22 RX ORDER — FAMOTIDINE 10 MG/ML
20 INJECTION, SOLUTION INTRAVENOUS ONCE
Status: CANCELLED | OUTPATIENT
Start: 2019-09-05

## 2019-08-22 RX ORDER — SODIUM CHLORIDE 9 MG/ML
250 INJECTION, SOLUTION INTRAVENOUS ONCE
Status: CANCELLED | OUTPATIENT
Start: 2019-09-05

## 2019-08-22 RX ORDER — SODIUM CHLORIDE 9 MG/ML
250 INJECTION, SOLUTION INTRAVENOUS ONCE
Status: CANCELLED | OUTPATIENT
Start: 2019-09-12

## 2019-08-22 RX ORDER — DIPHENHYDRAMINE HCL 25 MG
25 CAPSULE ORAL ONCE
Status: CANCELLED | OUTPATIENT
Start: 2019-09-12

## 2019-08-22 RX ORDER — FAMOTIDINE 10 MG/ML
20 INJECTION, SOLUTION INTRAVENOUS ONCE
Status: CANCELLED | OUTPATIENT
Start: 2019-09-12

## 2019-08-22 RX ORDER — DIPHENHYDRAMINE HCL 25 MG
25 CAPSULE ORAL ONCE
Status: CANCELLED | OUTPATIENT
Start: 2019-09-05

## 2019-08-22 NOTE — TELEPHONE ENCOUNTER
PT'S MOM CALLING ABOUT FERRITIN AND IRON PANEL RESULTS. GAVE HER RESULTS AND EXPLAINED THAT MD HAD ORDERED 2 DOSES OF IV IRON. GAVE HER THE APPT TIMES. SHE V/U TO ALL AND WILL LET PT KNOW.

## 2019-08-22 NOTE — TELEPHONE ENCOUNTER
----- Message from Johana Estrada RN sent at 8/22/2019  8:36 AM EDT -----  See Dr. Pierre's note below.   ----- Message -----  From: Myra Pierre MD  Sent: 8/22/2019   8:26 AM  To: [facility]    Please inform the patient that her iron levels are low.  Please arrange for her to receive 2 doses of IV Injectafer with premeds.    Thank you

## 2019-08-22 NOTE — PROGRESS NOTES
Injectafer plan with IV Pepcid 20mg and PO Benedryl 25mg per VO Dr Pierre  Message to appt desk to arrange

## 2019-08-26 LAB
Lab: NORMAL
METHYLMALONATE SERPL-SCNC: 100 NMOL/L (ref 0–378)

## 2019-08-30 ENCOUNTER — APPOINTMENT (OUTPATIENT)
Dept: ONCOLOGY | Facility: HOSPITAL | Age: 19
End: 2019-08-30

## 2019-09-01 NOTE — PROGRESS NOTES
Arrives by EMS. C/o right sided flank pain x 2 days.  Reports N/V. CC:  Pregnancy  Patient reports headaches when waking up in the morning.  She iha used Tylenol which sometimes helps.  Recommend maintaining hydration and will try daily magnesium oxide.  Reviewed ultrasound and prenatal labs with patient.  She has not completed a baseline 24-hour urine protein and she was encouraged to complete this.  Patient also asks today about having a tubal ligation.  Explained to her that she cannot have a tubal ligation before the age of 19 in the [facility].  Discussed MSAFP 4 testing and she desires.  A/P:  Supervision of pregnancy at 14 weeks with h/o preeclampsia  --Advised to complete 24 hour urine protein  --msafp 4 testing today  --F/U in 4 weeks  Counseling was given to patient for the following topics: diagnostic results, instructions for management, patient and family education and importance of treatment compliance . Total time of the encounter was 15 minutes and 10 minutes was spend counseling.

## 2019-09-06 ENCOUNTER — INFUSION (OUTPATIENT)
Dept: ONCOLOGY | Facility: HOSPITAL | Age: 19
End: 2019-09-06

## 2019-09-06 VITALS
DIASTOLIC BLOOD PRESSURE: 76 MMHG | HEART RATE: 61 BPM | SYSTOLIC BLOOD PRESSURE: 107 MMHG | OXYGEN SATURATION: 99 % | BODY MASS INDEX: 29.76 KG/M2 | WEIGHT: 172.2 LBS | TEMPERATURE: 98.2 F

## 2019-09-06 DIAGNOSIS — IMO0001 ADVERSE EFFECT OF IRON OR ITS COMPOUND, SUBSEQUENT ENCOUNTER: ICD-10-CM

## 2019-09-06 DIAGNOSIS — D50.9 IRON DEFICIENCY ANEMIA, UNSPECIFIED IRON DEFICIENCY ANEMIA TYPE: Primary | ICD-10-CM

## 2019-09-06 LAB — B-HCG UR QL: NEGATIVE

## 2019-09-06 PROCEDURE — 63710000001 DIPHENHYDRAMINE PER 50 MG: Performed by: INTERNAL MEDICINE

## 2019-09-06 PROCEDURE — 96374 THER/PROPH/DIAG INJ IV PUSH: CPT | Performed by: INTERNAL MEDICINE

## 2019-09-06 PROCEDURE — 96375 TX/PRO/DX INJ NEW DRUG ADDON: CPT | Performed by: INTERNAL MEDICINE

## 2019-09-06 PROCEDURE — 25010000002 FERRIC CARBOXYMALTOSE 750 MG/15ML SOLUTION 15 ML VIAL: Performed by: INTERNAL MEDICINE

## 2019-09-06 PROCEDURE — 81025 URINE PREGNANCY TEST: CPT | Performed by: INTERNAL MEDICINE

## 2019-09-06 RX ORDER — SODIUM CHLORIDE 9 MG/ML
250 INJECTION, SOLUTION INTRAVENOUS ONCE
Status: COMPLETED | OUTPATIENT
Start: 2019-09-06 | End: 2019-09-06

## 2019-09-06 RX ORDER — DIPHENHYDRAMINE HCL 25 MG
25 CAPSULE ORAL ONCE
Status: COMPLETED | OUTPATIENT
Start: 2019-09-06 | End: 2019-09-06

## 2019-09-06 RX ORDER — FAMOTIDINE 10 MG/ML
20 INJECTION, SOLUTION INTRAVENOUS ONCE
Status: COMPLETED | OUTPATIENT
Start: 2019-09-06 | End: 2019-09-06

## 2019-09-06 RX ADMIN — DIPHENHYDRAMINE HYDROCHLORIDE 25 MG: 25 CAPSULE ORAL at 14:00

## 2019-09-06 RX ADMIN — FERRIC CARBOXYMALTOSE INJECTION 750 MG: 50 INJECTION, SOLUTION INTRAVENOUS at 14:18

## 2019-09-06 RX ADMIN — FAMOTIDINE 20 MG: 10 INJECTION, SOLUTION INTRAVENOUS at 14:00

## 2019-09-06 RX ADMIN — SODIUM CHLORIDE 250 ML: 900 INJECTION, SOLUTION INTRAVENOUS at 14:03

## 2019-09-13 ENCOUNTER — INFUSION (OUTPATIENT)
Dept: ONCOLOGY | Facility: HOSPITAL | Age: 19
End: 2019-09-13

## 2019-09-13 VITALS — HEART RATE: 65 BPM | SYSTOLIC BLOOD PRESSURE: 116 MMHG | DIASTOLIC BLOOD PRESSURE: 70 MMHG

## 2019-09-13 DIAGNOSIS — IMO0001 ADVERSE EFFECT OF IRON OR ITS COMPOUND, SUBSEQUENT ENCOUNTER: ICD-10-CM

## 2019-09-13 DIAGNOSIS — D50.9 IRON DEFICIENCY ANEMIA, UNSPECIFIED IRON DEFICIENCY ANEMIA TYPE: Primary | ICD-10-CM

## 2019-09-13 PROCEDURE — 96375 TX/PRO/DX INJ NEW DRUG ADDON: CPT | Performed by: INTERNAL MEDICINE

## 2019-09-13 PROCEDURE — 96374 THER/PROPH/DIAG INJ IV PUSH: CPT | Performed by: INTERNAL MEDICINE

## 2019-09-13 PROCEDURE — 25010000002 FERRIC CARBOXYMALTOSE 750 MG/15ML SOLUTION 15 ML VIAL: Performed by: INTERNAL MEDICINE

## 2019-09-13 PROCEDURE — 63710000001 DIPHENHYDRAMINE PER 50 MG: Performed by: INTERNAL MEDICINE

## 2019-09-13 RX ORDER — FAMOTIDINE 10 MG/ML
20 INJECTION, SOLUTION INTRAVENOUS ONCE
Status: COMPLETED | OUTPATIENT
Start: 2019-09-13 | End: 2019-09-13

## 2019-09-13 RX ORDER — DIPHENHYDRAMINE HCL 25 MG
25 CAPSULE ORAL ONCE
Status: COMPLETED | OUTPATIENT
Start: 2019-09-13 | End: 2019-09-13

## 2019-09-13 RX ORDER — SODIUM CHLORIDE 9 MG/ML
250 INJECTION, SOLUTION INTRAVENOUS ONCE
Status: COMPLETED | OUTPATIENT
Start: 2019-09-13 | End: 2019-09-13

## 2019-09-13 RX ADMIN — DIPHENHYDRAMINE HYDROCHLORIDE 25 MG: 25 CAPSULE ORAL at 13:41

## 2019-09-13 RX ADMIN — FAMOTIDINE 20 MG: 10 INJECTION INTRAVENOUS at 13:40

## 2019-09-13 RX ADMIN — FERRIC CARBOXYMALTOSE INJECTION 750 MG: 50 INJECTION, SOLUTION INTRAVENOUS at 14:00

## 2019-09-13 RX ADMIN — SODIUM CHLORIDE 250 ML: 9 INJECTION, SOLUTION INTRAVENOUS at 13:41

## 2019-09-20 ENCOUNTER — CONVERSION ENCOUNTER (OUTPATIENT)
Dept: FAMILY MEDICINE CLINIC | Facility: CLINIC | Age: 19
End: 2019-09-20

## 2019-09-20 ENCOUNTER — HOSPITAL ENCOUNTER (OUTPATIENT)
Dept: FAMILY MEDICINE CLINIC | Facility: CLINIC | Age: 19
Discharge: HOME | End: 2019-09-20
Attending: NURSE PRACTITIONER

## 2019-09-20 ENCOUNTER — OFFICE VISIT CONVERTED (OUTPATIENT)
Dept: FAMILY MEDICINE CLINIC | Facility: CLINIC | Age: 19
End: 2019-09-20
Attending: NURSE PRACTITIONER

## 2019-09-20 LAB
ALBUMIN SERPL-MCNC: 4.7 G/DL (ref 3.5–5)
ALBUMIN/GLOB SERPL: 1.5 {RATIO} (ref 1.4–2.6)
ALP SERPL-CCNC: 88 U/L (ref 42–98)
ALT SERPL-CCNC: 24 U/L (ref 10–40)
AMYLASE SERPL-CCNC: 55 U/L (ref 30–110)
ANION GAP SERPL CALC-SCNC: 20 MMOL/L (ref 8–19)
AST SERPL-CCNC: 20 U/L (ref 15–50)
BASOPHILS # BLD AUTO: 0.05 10*3/UL (ref 0–0.2)
BASOPHILS NFR BLD AUTO: 0.6 % (ref 0–3)
BILIRUB SERPL-MCNC: 0.16 MG/DL (ref 0.2–1.3)
BUN SERPL-MCNC: 15 MG/DL (ref 5–25)
BUN/CREAT SERPL: 21 {RATIO} (ref 6–20)
CALCIUM SERPL-MCNC: 9.9 MG/DL (ref 8.7–10.4)
CHLORIDE SERPL-SCNC: 103 MMOL/L (ref 99–111)
CONV ABS IMM GRAN: 0.03 10*3/UL (ref 0–0.2)
CONV CO2: 22 MMOL/L (ref 22–32)
CONV IMMATURE GRAN: 0.3 % (ref 0–1.8)
CONV TOTAL PROTEIN: 7.9 G/DL (ref 6.3–8.2)
CREAT UR-MCNC: 0.73 MG/DL (ref 0.5–0.9)
DEPRECATED RDW RBC AUTO: 44.6 FL (ref 36.4–46.3)
EOSINOPHIL # BLD AUTO: 0.38 10*3/UL (ref 0–0.7)
EOSINOPHIL # BLD AUTO: 4.2 % (ref 0–7)
ERYTHROCYTE [DISTWIDTH] IN BLOOD BY AUTOMATED COUNT: 13.3 % (ref 11.7–14.4)
GFR SERPLBLD BASED ON 1.73 SQ M-ARVRAT: >60 ML/MIN/{1.73_M2}
GLOBULIN UR ELPH-MCNC: 3.2 G/DL (ref 2–3.5)
GLUCOSE SERPL-MCNC: 87 MG/DL (ref 65–99)
HCT VFR BLD AUTO: 44.9 % (ref 37–47)
HGB BLD-MCNC: 14.2 G/DL (ref 12–16)
LIPASE SERPL-CCNC: 27 U/L (ref 5–51)
LYMPHOCYTES # BLD AUTO: 1.95 10*3/UL (ref 1–5)
LYMPHOCYTES NFR BLD AUTO: 21.5 % (ref 20–45)
MCH RBC QN AUTO: 28.9 PG (ref 27–31)
MCHC RBC AUTO-ENTMCNC: 31.6 G/DL (ref 33–37)
MCV RBC AUTO: 91.4 FL (ref 81–99)
MONOCYTES # BLD AUTO: 0.58 10*3/UL (ref 0.2–1.2)
MONOCYTES NFR BLD AUTO: 6.4 % (ref 3–10)
NEUTROPHILS # BLD AUTO: 6.06 10*3/UL (ref 2–8)
NEUTROPHILS NFR BLD AUTO: 67 % (ref 30–85)
NRBC CBCN: 0 % (ref 0–0.7)
OSMOLALITY SERPL CALC.SUM OF ELEC: 292 MOSM/KG (ref 273–304)
PLATELET # BLD AUTO: 214 10*3/UL (ref 130–400)
PMV BLD AUTO: 11.7 FL (ref 9.4–12.3)
POTASSIUM SERPL-SCNC: 4 MMOL/L (ref 3.5–5.3)
RBC # BLD AUTO: 4.91 10*6/UL (ref 4.2–5.4)
SODIUM SERPL-SCNC: 141 MMOL/L (ref 135–147)
WBC # BLD AUTO: 9.05 10*3/UL (ref 4.8–10.8)

## 2019-10-15 ENCOUNTER — OFFICE VISIT (OUTPATIENT)
Dept: GASTROENTEROLOGY | Facility: CLINIC | Age: 19
End: 2019-10-15

## 2019-10-15 VITALS
WEIGHT: 167 LBS | DIASTOLIC BLOOD PRESSURE: 78 MMHG | TEMPERATURE: 98.9 F | SYSTOLIC BLOOD PRESSURE: 110 MMHG | BODY MASS INDEX: 29.59 KG/M2 | HEIGHT: 63 IN

## 2019-10-15 DIAGNOSIS — K59.04 CHRONIC IDIOPATHIC CONSTIPATION: Primary | ICD-10-CM

## 2019-10-15 PROCEDURE — 99204 OFFICE O/P NEW MOD 45 MIN: CPT | Performed by: INTERNAL MEDICINE

## 2019-10-15 RX ORDER — POLYETHYLENE GLYCOL 3350 17 G/17G
17 POWDER, FOR SOLUTION ORAL AS NEEDED
COMMUNITY
End: 2019-10-15 | Stop reason: SDDI

## 2019-10-15 NOTE — PROGRESS NOTES
Chief Complaint   Patient presents with   • Abdominal Pain   • Constipation   • Rectal Bleeding     Sweetie Hernandez is a 19 y.o. female who presents with a history of constipation  HPI     Patient 19-year-old female with history of bipolar disorder.  Factor V Leiden heterozygote presenting with constipation with nausea.  Patient's x-rays show positive mild to moderate retention of stool.  Patient otherwise well.  Patient does have a history of GI complaints many years ago.  Patient had EGD and colonoscopy at age 13 that reportedly was unremarkable because of abdominal pain with nausea and vomiting.  Patient reports wake up nauseated.    Past Medical History:   Diagnosis Date   • Anxiety    • Bipolar disorder (CMS/[facility])    • Depression     meds prior to pregnancy   • Factor V Leiden (CMS/[facility])    • Preeclampsia    • Seasonal allergies    • Trichimoniasis    • Urinary tract infection    • Urogenital trichomoniasis        Current Outpatient Medications:   •  DULoxetine (CYMBALTA) 20 MG capsule, Take 1 capsule by mouth Daily., Disp: 30 capsule, Rfl: 6  •  linaclotide (LINZESS) 72 MCG capsule capsule, Take 1 capsule by mouth Every Morning Before Breakfast., Disp: 30 capsule, Rfl: 0  Allergies   Allergen Reactions   • Hydrocodone Anaphylaxis     Social History     Socioeconomic History   • Marital status:      Spouse name: Meliton   • Number of children: Not on file   • Years of education: High school   • Highest education level: Not on file   Occupational History     Employer: UNEMPLOYED   Tobacco Use   • Smoking status: Current Every Day Smoker     Packs/day: 1.00     Years: 5.00     Pack years: 5.00     Types: Cigarettes     Last attempt to quit: 10/21/2016     Years since quittin.9   • Smokeless tobacco: Never Used   Substance and Sexual Activity   • Alcohol use: No   • Drug use: No   • Sexual activity: Yes     Partners: Male     Birth control/protection: None     Family History   Problem Relation Age of Onset    • Diabetes Mother    • Hypertension Mother    • Heart disease Mother    • Thyroid disease Sister    • Heart disease Maternal Uncle    • Heart disease Maternal Grandmother    • Heart disease Maternal Grandfather    • Hypertension Maternal Grandfather    • Cancer Maternal Grandfather    • Colon cancer Cousin      Review of Systems   Constitutional: Negative.    HENT: Negative.    Respiratory: Negative.    Cardiovascular: Negative.    Gastrointestinal: Positive for abdominal distention, abdominal pain, anal bleeding, constipation and nausea. Negative for blood in stool, diarrhea and vomiting.   Endocrine: Negative.    Musculoskeletal: Negative.    Skin: Negative.    Allergic/Immunologic: Negative.    Hematological: Negative.      Vitals:    10/15/19 1327   BP: 110/78   Temp: 98.9 °F (37.2 °C)     Physical Exam   Constitutional: She is oriented to person, place, and time. She appears well-developed and well-nourished.   HENT:   Head: Normocephalic and atraumatic.   Eyes: Pupils are equal, round, and reactive to light. No scleral icterus.   Neck: Normal range of motion.   Cardiovascular: Normal rate, regular rhythm and normal heart sounds. Exam reveals no gallop and no friction rub.   No murmur heard.  Pulmonary/Chest: Effort normal and breath sounds normal. She has no wheezes. She has no rales.   Abdominal: Soft. Bowel sounds are normal. She exhibits no shifting dullness, no distension, no pulsatile liver, no fluid wave, no abdominal bruit, no ascites, no pulsatile midline mass and no mass. There is no hepatosplenomegaly. There is no tenderness. There is no rigidity and no guarding. No hernia.   Musculoskeletal: Normal range of motion. She exhibits no edema.   Lymphadenopathy:     She has no cervical adenopathy.   Neurological: She is alert and oriented to person, place, and time. No cranial nerve deficit.   Skin: Skin is warm and dry. No rash noted.   Psychiatric: She has a normal mood and affect. Her behavior is  normal. Thought content normal.   Nursing note and vitals reviewed.    Diagnoses and all orders for this visit:    Chronic idiopathic constipation    Other orders  -     Discontinue: polyethylene glycol (MIRALAX) packet; Take 17 g by mouth As Needed.  -     linaclotide (LINZESS) 72 MCG capsule capsule; Take 1 capsule by mouth Every Morning Before Breakfast.    L with history of pregnancy x2 with long history of GI complaints.  Patient reports had EGD and colonoscopy at age 13 for abdominal pain nausea vomiting patient reports she believes was unremarkable.  Patient now with months of constipation often will go more than a week without a bowel movement.  Patient reports mornings will wake up nauseated no particular vomiting however.  Patient sometimes will see blood on the stool with painful bowel movement.  Abdominal x-ray shows mild to moderate stool retention.  Ultrasound last year was negative.  Will begin Linzess 72 mcg daily and follow clinical response.  Patient was given MiraLAX but was reluctant to take it because she was worried she would not be able to test make it through work for having diarrhea.  Explained that the goal of therapy was to make the bowels regular not to have diarrhea.  We will follow-up clinically pending on response to therapy.

## 2019-10-16 ENCOUNTER — TELEPHONE (OUTPATIENT)
Dept: OBSTETRICS AND GYNECOLOGY | Facility: CLINIC | Age: 19
End: 2019-10-16

## 2019-10-23 ENCOUNTER — HOSPITAL ENCOUNTER (OUTPATIENT)
Dept: OTHER | Facility: HOSPITAL | Age: 19
Discharge: HOME | End: 2019-10-23
Attending: NURSE PRACTITIONER

## 2019-11-07 ENCOUNTER — HOSPITAL ENCOUNTER (OUTPATIENT)
Dept: FAMILY MEDICINE CLINIC | Facility: CLINIC | Age: 19
Discharge: HOME | End: 2019-11-07
Attending: NURSE PRACTITIONER

## 2019-11-07 ENCOUNTER — OFFICE VISIT CONVERTED (OUTPATIENT)
Dept: FAMILY MEDICINE CLINIC | Facility: CLINIC | Age: 19
End: 2019-11-07
Attending: NURSE PRACTITIONER

## 2019-12-06 ENCOUNTER — TELEPHONE (OUTPATIENT)
Dept: OBSTETRICS AND GYNECOLOGY | Facility: CLINIC | Age: 19
End: 2019-12-06

## 2019-12-11 ENCOUNTER — OFFICE VISIT CONVERTED (OUTPATIENT)
Dept: FAMILY MEDICINE CLINIC | Facility: CLINIC | Age: 19
End: 2019-12-11
Attending: NURSE PRACTITIONER

## 2019-12-11 ENCOUNTER — HOSPITAL ENCOUNTER (OUTPATIENT)
Dept: FAMILY MEDICINE CLINIC | Facility: CLINIC | Age: 19
Discharge: HOME | End: 2019-12-11
Attending: NURSE PRACTITIONER

## 2019-12-11 LAB
BASOPHILS # BLD AUTO: 0.06 10*3/UL (ref 0–0.2)
BASOPHILS NFR BLD AUTO: 0.7 % (ref 0–3)
CONV ABS IMM GRAN: 0.02 10*3/UL (ref 0–0.2)
CONV IMMATURE GRAN: 0.2 % (ref 0–1.8)
DEPRECATED RDW RBC AUTO: 43.9 FL (ref 36.4–46.3)
EOSINOPHIL # BLD AUTO: 0.36 10*3/UL (ref 0–0.7)
EOSINOPHIL # BLD AUTO: 4 % (ref 0–7)
ERYTHROCYTE [DISTWIDTH] IN BLOOD BY AUTOMATED COUNT: 12.8 % (ref 11.7–14.4)
HCT VFR BLD AUTO: 46.3 % (ref 37–47)
HGB BLD-MCNC: 14.9 G/DL (ref 12–16)
LYMPHOCYTES # BLD AUTO: 2.62 10*3/UL (ref 1–5)
LYMPHOCYTES NFR BLD AUTO: 29.4 % (ref 20–45)
MCH RBC QN AUTO: 30.2 PG (ref 27–31)
MCHC RBC AUTO-ENTMCNC: 32.2 G/DL (ref 33–37)
MCV RBC AUTO: 93.9 FL (ref 81–99)
MONOCYTES # BLD AUTO: 0.5 10*3/UL (ref 0.2–1.2)
MONOCYTES NFR BLD AUTO: 5.6 % (ref 3–10)
NEUTROPHILS # BLD AUTO: 5.34 10*3/UL (ref 2–8)
NEUTROPHILS NFR BLD AUTO: 60.1 % (ref 30–85)
NRBC CBCN: 0 % (ref 0–0.7)
PLATELET # BLD AUTO: 225 10*3/UL (ref 130–400)
PMV BLD AUTO: 10.9 FL (ref 9.4–12.3)
RBC # BLD AUTO: 4.93 10*6/UL (ref 4.2–5.4)
T4 FREE SERPL-MCNC: 1.1 NG/DL (ref 0.9–1.8)
TSH SERPL-ACNC: 1.6 M[IU]/L (ref 0.27–4.2)
WBC # BLD AUTO: 8.9 10*3/UL (ref 4.8–10.8)

## 2019-12-12 LAB
FERRITIN SERPL-MCNC: 359 NG/ML (ref 10–200)
IRON SATN MFR SERPL: 18 % (ref 20–55)
IRON SERPL-MCNC: 65 UG/DL (ref 60–170)
TIBC SERPL-MCNC: 358 UG/DL (ref 245–450)
TRANSFERRIN SERPL-MCNC: 250 MG/DL (ref 250–380)

## 2019-12-31 ENCOUNTER — OFFICE VISIT CONVERTED (OUTPATIENT)
Dept: FAMILY MEDICINE CLINIC | Facility: CLINIC | Age: 19
End: 2019-12-31
Attending: FAMILY MEDICINE

## 2020-03-02 ENCOUNTER — LAB (OUTPATIENT)
Dept: LAB | Facility: HOSPITAL | Age: 20
End: 2020-03-02

## 2020-03-02 ENCOUNTER — OFFICE VISIT (OUTPATIENT)
Dept: ONCOLOGY | Facility: CLINIC | Age: 20
End: 2020-03-02

## 2020-03-02 VITALS
TEMPERATURE: 98.8 F | RESPIRATION RATE: 14 BRPM | HEART RATE: 67 BPM | WEIGHT: 162.5 LBS | HEIGHT: 63 IN | BODY MASS INDEX: 28.79 KG/M2 | OXYGEN SATURATION: 97 % | SYSTOLIC BLOOD PRESSURE: 127 MMHG | DIASTOLIC BLOOD PRESSURE: 84 MMHG

## 2020-03-02 DIAGNOSIS — R23.3 EASY BRUISING: ICD-10-CM

## 2020-03-02 DIAGNOSIS — D68.51 FACTOR 5 LEIDEN MUTATION, HETEROZYGOUS (HCC): ICD-10-CM

## 2020-03-02 DIAGNOSIS — D50.0 IRON DEFICIENCY ANEMIA DUE TO CHRONIC BLOOD LOSS: ICD-10-CM

## 2020-03-02 DIAGNOSIS — D50.9 IRON DEFICIENCY ANEMIA, UNSPECIFIED IRON DEFICIENCY ANEMIA TYPE: Primary | ICD-10-CM

## 2020-03-02 LAB
APTT PPP: 32.1 SECONDS (ref 22.7–35.4)
BASOPHILS # BLD AUTO: 0.05 10*3/MM3 (ref 0–0.2)
BASOPHILS NFR BLD AUTO: 0.7 % (ref 0–1.5)
CLOSURE TME COLL+ADP BLD: 111 SECONDS (ref 52–122)
CLOSURE TME COLL+EPINEP BLD: 99 SECONDS (ref 68–148)
DEPRECATED RDW RBC AUTO: 42.9 FL (ref 37–54)
EOSINOPHIL # BLD AUTO: 0.13 10*3/MM3 (ref 0–0.4)
EOSINOPHIL NFR BLD AUTO: 1.8 % (ref 0.3–6.2)
ERYTHROCYTE [DISTWIDTH] IN BLOOD BY AUTOMATED COUNT: 12.2 % (ref 12.3–15.4)
FERRITIN SERPL-MCNC: 316.3 NG/ML (ref 11–207)
FIBRINOGEN PPP-MCNC: 500 MG/DL (ref 219–464)
HCT VFR BLD AUTO: 44.4 % (ref 34–46.6)
HGB BLD-MCNC: 14.6 G/DL (ref 12–15.9)
IMM GRANULOCYTES # BLD AUTO: 0.02 10*3/MM3 (ref 0–0.05)
IMM GRANULOCYTES NFR BLD AUTO: 0.3 % (ref 0–0.5)
INR PPP: 0.95 (ref 0.9–1.1)
IRON 24H UR-MRATE: 79 MCG/DL (ref 37–145)
IRON SATN MFR SERPL: 22 % (ref 14–48)
LYMPHOCYTES # BLD AUTO: 1.94 10*3/MM3 (ref 0.7–3.1)
LYMPHOCYTES NFR BLD AUTO: 26.1 % (ref 19.6–45.3)
MCH RBC QN AUTO: 31.7 PG (ref 26.6–33)
MCHC RBC AUTO-ENTMCNC: 32.9 G/DL (ref 31.5–35.7)
MCV RBC AUTO: 96.3 FL (ref 79–97)
MONOCYTES # BLD AUTO: 0.62 10*3/MM3 (ref 0.1–0.9)
MONOCYTES NFR BLD AUTO: 8.4 % (ref 5–12)
NEUTROPHILS # BLD AUTO: 4.66 10*3/MM3 (ref 1.7–7)
NEUTROPHILS NFR BLD AUTO: 62.7 % (ref 42.7–76)
NRBC BLD AUTO-RTO: 0 /100 WBC (ref 0–0.2)
PLATELET # BLD AUTO: 193 10*3/MM3 (ref 140–450)
PMV BLD AUTO: 10.1 FL (ref 6–12)
PROTHROMBIN TIME: 12.4 SECONDS (ref 11.7–14.2)
RBC # BLD AUTO: 4.61 10*6/MM3 (ref 3.77–5.28)
TIBC SERPL-MCNC: 354 MCG/DL (ref 249–505)
TRANSFERRIN SERPL-MCNC: 253 MG/DL (ref 200–360)
WBC NRBC COR # BLD: 7.42 10*3/MM3 (ref 3.4–10.8)

## 2020-03-02 PROCEDURE — 84466 ASSAY OF TRANSFERRIN: CPT

## 2020-03-02 PROCEDURE — 99214 OFFICE O/P EST MOD 30 MIN: CPT | Performed by: INTERNAL MEDICINE

## 2020-03-02 PROCEDURE — 36415 COLL VENOUS BLD VENIPUNCTURE: CPT

## 2020-03-02 PROCEDURE — 85576 BLOOD PLATELET AGGREGATION: CPT | Performed by: INTERNAL MEDICINE

## 2020-03-02 PROCEDURE — 83540 ASSAY OF IRON: CPT

## 2020-03-02 PROCEDURE — 85730 THROMBOPLASTIN TIME PARTIAL: CPT | Performed by: INTERNAL MEDICINE

## 2020-03-02 PROCEDURE — 85025 COMPLETE CBC W/AUTO DIFF WBC: CPT

## 2020-03-02 PROCEDURE — 85384 FIBRINOGEN ACTIVITY: CPT | Performed by: INTERNAL MEDICINE

## 2020-03-02 PROCEDURE — 82728 ASSAY OF FERRITIN: CPT

## 2020-03-02 PROCEDURE — 85610 PROTHROMBIN TIME: CPT | Performed by: INTERNAL MEDICINE

## 2020-03-02 NOTE — PROGRESS NOTES
Subjective     CHIEF COMPLAINT:      Chief Complaint   Patient presents with   • Follow-up     concern about bruising and rash on legs       HISTORY OF PRESENT ILLNESS:     Sweetie Hernandez is a 19 y.o. female patient who returns today for follow up on her anemia.  She reports fatigue but her fatigue improved since she received IV iron infusions.  She tolerated infusions well.    Patient reports skin rash that developed over the arms and over the thighs.  It is mainly over the inner aspect of the thighs.  It is usually more itchy after she showers.    Patient states that after the skin rash improved, she noticed bruising over the areas.  She is not taking any aspirin or NSAIDs.  She when she has headaches, she takes Tylenol.  Patient states that she had excessive post partum bleeding after her second delivery.  She did not have problems with bleeding after previous surgeries in the form of tonsillectomy and adenoidectomy.    On review of the patient's medicines, Cymbalta is on her medicine list.  However, she states that she stopped it 8 months ago.    REVIEW OF SYSTEMS:  Review of Systems   Constitutional: Positive for fatigue. Negative for chills, fever and unexpected weight change.   HENT: Negative for mouth sores, nosebleeds, sore throat and voice change.    Eyes: Negative for visual disturbance.   Respiratory: Negative for cough and shortness of breath.    Cardiovascular: Negative for chest pain and leg swelling.   Gastrointestinal: Negative for abdominal pain, blood in stool, constipation, diarrhea, nausea and vomiting.   Genitourinary: Negative for dysuria, frequency and hematuria.   Musculoskeletal: Positive for back pain. Negative for arthralgias and joint swelling.   Skin: Positive for rash.   Neurological: Positive for light-headedness and headaches. Negative for dizziness and numbness.   Hematological: Negative for adenopathy. Bruises/bleeds easily.   Psychiatric/Behavioral: Negative for dysphoric mood.  "The patient is not nervous/anxious.      I verified the ROS obtained by the MA.      Past Medical History:   Diagnosis Date   • Anxiety    • Bipolar disorder (CMS/HCC)    • Depression     meds prior to pregnancy   • Factor V Leiden (CMS/HCC)    • Preeclampsia    • Seasonal allergies    • Trichimoniasis    • Urinary tract infection    • Urogenital trichomoniasis        Past Surgical History:   Procedure Laterality Date   • ADENOIDECTOMY     •  SECTION N/A 2018    Procedure:  SECTION PRIMARY;  Surgeon: Radha Noel MD;  Location: [facility] LABOR DELIVERY;  Service: Obstetrics/Gynecology   • COLONOSCOPY  approx     normal per pt    • EYE SURGERY     • TONSILLECTOMY     • UPPER GASTROINTESTINAL ENDOSCOPY  approx     reflux and scaring per pt        Cancer-related family history includes Cancer in her maternal grandfather; Colon cancer in her cousin.  Social History     Tobacco Use   • Smoking status: Current Every Day Smoker     Packs/day: 1.00     Years: 5.00     Pack years: 5.00     Types: Cigarettes     Last attempt to quit: 10/21/2016     Years since quitting: 3.3   • Smokeless tobacco: Never Used   Substance Use Topics   • Alcohol use: Yes     Comment: rare       MEDICATIONS:    Current Outpatient Medications:   •  DULoxetine (CYMBALTA) 20 MG capsule, Take 1 capsule by mouth Daily., Disp: 30 capsule, Rfl: 6  •  linaclotide (LINZESS) 72 MCG capsule capsule, Take 1 capsule by mouth Every Morning Before Breakfast., Disp: 30 capsule, Rfl: 0    ALLERGIES:  Allergies   Allergen Reactions   • Hydrocodone Anaphylaxis         Objective   VITAL SIGNS:     Vitals:    20 1540   BP: 127/84   Pulse: 67   Resp: 14   Temp: 98.8 °F (37.1 °C)   TempSrc: Oral   SpO2: 97%   Weight: 73.7 kg (162 lb 8 oz)   Height: 160 cm (62.99\")   PainSc: 0-No pain     Body mass index is 28.79 kg/m².     Wt Readings from Last 3 Encounters:   20 73.7 kg (162 lb 8 oz)   10/15/19 75.8 kg (167 lb)   19 " 78.1 kg (172 lb 3.2 oz)       PHYSICAL EXAMINATION:  GENERAL:  The patient appears in good general condition, not in acute distress.  SKIN: Warm and dry.  Macular skin rash over the right arm with signs of improvement.  Old ecchymosis over the right forearm and right elbow.  HEAD:  Normocephalic.  EYES:  No Jaundice. No Pallor.   NECK:  Supple with Good ROM. No Thyromegaly. No Masses.  CHEST: Normal respiratory effort.   CARDIAC:   No edema.  ABDOMEN: Non-distended.  EXTREMITIES:  No clubbing. No deforming arthritis in the hands.  No size difference between the legs.  NEUROLOGICAL:  No Focal neurological deficits.       DIAGNOSTIC DATA:     Results from last 7 days   Lab Units 20  1525   WBC 10*3/mm3 7.42   NEUTROS ABS 10*3/mm3 4.66   HEMOGLOBIN g/dL 14.6   HEMATOCRIT % 44.4   PLATELETS 10*3/mm3 193     Lab Results   Component Value Date    FERRITIN 316.30 (H) 2020    FERRITIN 15.80 2019    IRON 79 2020    IRON 51 2019    TIBC 354 2020    TIBC 466 2019     Lab Results   Component Value Date    FOLATE 12.80 2019     Lab Results   Component Value Date    RZPELTOI70 660 2019         Assessment/Plan   1.  Factor V Leiden heterozygous mutation.    · The patient has no prior history of deep vein thrombosis, thrombophlebitis or pulmonary embolism.   · Family history is positive for thrombosis in her maternal grandfather.   · The patient did well with 2 pregnancies. She was given Lovenox prophylactically for 3 days after her normal vaginal delivery in 2017 and for 6 weeks after her  in 2018.  · For contraception, we recommended IUD but the patient could not have it placed.  She decided to have Depo-Provera but did not tolerate it.  After that, she had Nexplanon but she did not tolerate Nexplanon implant that was placed in 2019.  · Patient does not have any symptoms to suggest development of DVT or phlebitis on today's evaluation.    2.  Iron  deficiency anemia.    · Lowest hemoglobin in 2017 was 7.4 on 6/23/2017 and in 2018 was 8.8 on 8/22/2018.  · She did not tolerate oral iron therapy.  She has GERD symptoms.    · She developed upset stomach after taking oral iron.   · Patient was given IV Injectafer on 9/6/2019 and 9/13/2019.  · Hemoglobin improved to 14.6 today.  Iron stores improved as well.  She does not need additional IV iron at this point.    3.  Easy bruisability.  Patient reports developing bruises over her upper extremities without any preceding trauma.  She does not take aspirin or NSAIDs.  She is no longer taking Cymbalta.  The etiology of this is not clear.    PLAN:    1.  Patient does not need additional IV iron infusions at this point.   2.  I will obtain work-up today to evaluate for a possible bleeding disorder.  I will obtain platelet function analysis,  PT, PTT, fibrinogen and von Willebrand panel.  We will contact her with the results.  3.  We will repeat CBC ferritin iron panel in 6 months with RN review.  I will see her in follow-up in 1 year with CBC ferritin iron panel.    Myra Pierre MD  03/02/20

## 2020-03-03 LAB
FACT VIII ACT/NOR PPP: 92 % (ref 56–140)
VW INTERPRETATION: NORMAL
VWF AG ACT/NOR PPP IA: 88 % (ref 50–200)
VWF CP PPP QL: 91 % (ref 50–200)

## 2020-03-04 ENCOUNTER — TELEPHONE (OUTPATIENT)
Dept: ONCOLOGY | Facility: CLINIC | Age: 20
End: 2020-03-04

## 2020-03-04 NOTE — TELEPHONE ENCOUNTER
PT WOULD LIKE FOR SOMEONE TO GIVE HER A CALL REGARDING HER LAB RESULTS FROM HER LAST APPT ON 3/2/20.

## 2020-03-05 ENCOUNTER — TELEPHONE (OUTPATIENT)
Dept: ONCOLOGY | Facility: HOSPITAL | Age: 20
End: 2020-03-05

## 2020-03-05 ENCOUNTER — TELEPHONE (OUTPATIENT)
Dept: OBSTETRICS AND GYNECOLOGY | Facility: CLINIC | Age: 20
End: 2020-03-05

## 2020-03-05 NOTE — TELEPHONE ENCOUNTER
Called pt about n/s on 3/3/20, pt states she had been working a lot lately and forgot, pt will call back to [email or web address]

## 2020-03-05 NOTE — TELEPHONE ENCOUNTER
Patient called and given message below.  Reminded patient to make apt for 6 months with labs and RN review and in 1 year for labs and to see Dr. Pierre.  Pt v/u.

## 2020-03-05 NOTE — TELEPHONE ENCOUNTER
----- Message from Myra Pierre MD sent at 3/5/2020 12:14 PM EST -----  Please inform the patient that the labs obtained on 3/2/2020 showed no evidence of a bleeding disorder.  The fibrinogen test came back slightly elevated which may be attributed to inflammation.  The test was obtained to make sure it is not low which is associated with bleeding tendency.    Thank you

## 2020-06-08 ENCOUNTER — OFFICE VISIT CONVERTED (OUTPATIENT)
Dept: FAMILY MEDICINE CLINIC | Facility: CLINIC | Age: 20
End: 2020-06-08
Attending: NURSE PRACTITIONER

## 2020-06-15 ENCOUNTER — HOSPITAL ENCOUNTER (OUTPATIENT)
Dept: OTHER | Facility: HOSPITAL | Age: 20
Discharge: HOME | End: 2020-06-15
Attending: NURSE PRACTITIONER

## 2020-07-13 ENCOUNTER — OFFICE VISIT CONVERTED (OUTPATIENT)
Dept: FAMILY MEDICINE CLINIC | Facility: CLINIC | Age: 20
End: 2020-07-13
Attending: NURSE PRACTITIONER

## 2020-08-11 ENCOUNTER — CONVERSION ENCOUNTER (OUTPATIENT)
Dept: FAMILY MEDICINE CLINIC | Facility: CLINIC | Age: 20
End: 2020-08-11

## 2020-08-11 ENCOUNTER — OFFICE VISIT CONVERTED (OUTPATIENT)
Dept: FAMILY MEDICINE CLINIC | Facility: CLINIC | Age: 20
End: 2020-08-11
Attending: FAMILY MEDICINE

## 2020-08-11 ENCOUNTER — HOSPITAL ENCOUNTER (OUTPATIENT)
Dept: FAMILY MEDICINE CLINIC | Facility: CLINIC | Age: 20
Discharge: HOME | End: 2020-08-11
Attending: NURSE PRACTITIONER

## 2020-08-13 LAB
BACTERIA SPEC AEROBE CULT: NORMAL
SARS-COV-2 RNA SPEC QL NAA+PROBE: NOT DETECTED

## 2020-10-01 ENCOUNTER — TELEPHONE (OUTPATIENT)
Dept: ONCOLOGY | Facility: CLINIC | Age: 20
End: 2020-10-01

## 2020-10-01 NOTE — TELEPHONE ENCOUNTER
Pt called in to reschedule missed appt from 8/17/20  Pt can come in any meliza next week. please call pt back  With appt @[phone]

## 2020-10-05 ENCOUNTER — CLINICAL SUPPORT (OUTPATIENT)
Dept: ONCOLOGY | Facility: HOSPITAL | Age: 20
End: 2020-10-05

## 2020-10-05 ENCOUNTER — LAB (OUTPATIENT)
Dept: OTHER | Facility: HOSPITAL | Age: 20
End: 2020-10-05

## 2020-10-05 VITALS
BODY MASS INDEX: 27.93 KG/M2 | OXYGEN SATURATION: 100 % | TEMPERATURE: 97.8 F | WEIGHT: 157.6 LBS | SYSTOLIC BLOOD PRESSURE: 105 MMHG | RESPIRATION RATE: 18 BRPM | HEART RATE: 52 BPM | DIASTOLIC BLOOD PRESSURE: 67 MMHG | HEIGHT: 63 IN

## 2020-10-05 DIAGNOSIS — D50.8 OTHER IRON DEFICIENCY ANEMIA: Primary | ICD-10-CM

## 2020-10-05 LAB
BASOPHILS # BLD AUTO: 0.05 10*3/MM3 (ref 0–0.2)
BASOPHILS NFR BLD AUTO: 0.8 % (ref 0–1.5)
DEPRECATED RDW RBC AUTO: 41.5 FL (ref 37–54)
EOSINOPHIL # BLD AUTO: 0.37 10*3/MM3 (ref 0–0.4)
EOSINOPHIL NFR BLD AUTO: 5.7 % (ref 0.3–6.2)
ERYTHROCYTE [DISTWIDTH] IN BLOOD BY AUTOMATED COUNT: 11.9 % (ref 12.3–15.4)
FERRITIN SERPL-MCNC: 160.4 NG/ML (ref 13–150)
HCT VFR BLD AUTO: 42.8 % (ref 34–46.6)
HGB BLD-MCNC: 13.9 G/DL (ref 12–15.9)
IMM GRANULOCYTES # BLD AUTO: 0.03 10*3/MM3 (ref 0–0.05)
IMM GRANULOCYTES NFR BLD AUTO: 0.5 % (ref 0–0.5)
IRON 24H UR-MRATE: 95 MCG/DL (ref 37–145)
IRON SATN MFR SERPL: 27 % (ref 20–50)
LYMPHOCYTES # BLD AUTO: 1.85 10*3/MM3 (ref 0.7–3.1)
LYMPHOCYTES NFR BLD AUTO: 28.4 % (ref 19.6–45.3)
MCH RBC QN AUTO: 30.5 PG (ref 26.6–33)
MCHC RBC AUTO-ENTMCNC: 32.5 G/DL (ref 31.5–35.7)
MCV RBC AUTO: 93.9 FL (ref 79–97)
MONOCYTES # BLD AUTO: 0.37 10*3/MM3 (ref 0.1–0.9)
MONOCYTES NFR BLD AUTO: 5.7 % (ref 5–12)
NEUTROPHILS NFR BLD AUTO: 3.85 10*3/MM3 (ref 1.7–7)
NEUTROPHILS NFR BLD AUTO: 58.9 % (ref 42.7–76)
NRBC BLD AUTO-RTO: 0 /100 WBC (ref 0–0.2)
PLATELET # BLD AUTO: 179 10*3/MM3 (ref 140–450)
PMV BLD AUTO: 11 FL (ref 6–12)
RBC # BLD AUTO: 4.56 10*6/MM3 (ref 3.77–5.28)
TIBC SERPL-MCNC: 356 MCG/DL (ref 298–536)
TRANSFERRIN SERPL-MCNC: 239 MG/DL (ref 200–360)
WBC # BLD AUTO: 6.52 10*3/MM3 (ref 3.4–10.8)

## 2020-10-05 PROCEDURE — 85025 COMPLETE CBC W/AUTO DIFF WBC: CPT | Performed by: INTERNAL MEDICINE

## 2020-10-05 PROCEDURE — 82728 ASSAY OF FERRITIN: CPT | Performed by: INTERNAL MEDICINE

## 2020-10-05 PROCEDURE — 36415 COLL VENOUS BLD VENIPUNCTURE: CPT

## 2020-10-05 PROCEDURE — 84466 ASSAY OF TRANSFERRIN: CPT | Performed by: INTERNAL MEDICINE

## 2020-10-05 PROCEDURE — G0463 HOSPITAL OUTPT CLINIC VISIT: HCPCS

## 2020-10-05 PROCEDURE — 83540 ASSAY OF IRON: CPT | Performed by: INTERNAL MEDICINE

## 2020-10-05 NOTE — NURSING NOTE
Patient's CBC reviewed with patient. Patient will expect the other results to be reviewed and contacted whether or not she requires any iron repletion. Patient v/u. Sent Dr. Pierre message to review the labs to see if he would like her to have any iron repletion and to contact patient if that is the plan.   Iron Sat = 27  Lab Results   Component Value Date    WBC 6.52 10/05/2020    HGB 13.9 10/05/2020    HCT 42.8 10/05/2020    MCV 93.9 10/05/2020     10/05/2020     Lab Results   Component Value Date    IRON 95 10/05/2020    TIBC 356 10/05/2020    FERRITIN 160.40 (H) 10/05/2020     Dr. Pierre replied that patient does not need iron at this time. Contacted patient and relayed this information.

## 2020-12-15 ENCOUNTER — HOSPITAL ENCOUNTER (OUTPATIENT)
Dept: FAMILY MEDICINE CLINIC | Facility: CLINIC | Age: 20
Discharge: HOME | End: 2020-12-15
Attending: NURSE PRACTITIONER

## 2020-12-15 ENCOUNTER — OFFICE VISIT CONVERTED (OUTPATIENT)
Dept: FAMILY MEDICINE CLINIC | Facility: CLINIC | Age: 20
End: 2020-12-15
Attending: FAMILY MEDICINE

## 2020-12-16 LAB — SARS-COV-2 RNA SPEC QL NAA+PROBE: DETECTED

## 2020-12-17 LAB — BACTERIA SPEC AEROBE CULT: NORMAL

## 2021-01-22 ENCOUNTER — OFFICE VISIT CONVERTED (OUTPATIENT)
Dept: FAMILY MEDICINE CLINIC | Facility: CLINIC | Age: 21
End: 2021-01-22
Attending: FAMILY MEDICINE

## 2021-03-01 ENCOUNTER — TELEPHONE (OUTPATIENT)
Dept: ONCOLOGY | Facility: CLINIC | Age: 21
End: 2021-03-01

## 2021-03-01 NOTE — TELEPHONE ENCOUNTER
Caller: JACQUES ACOSTA    Relationship to patient: SELF    Best call back number: [phone]    Chief complaint: PT'S CAR IS DOWN AND WOULD LIKE TO RESCHEDULE 3-2-2021 APPT FOR A MONTH OR 2 OUT.    Type of visit: F/U WITH LAB    Requested date: A MONTH OR 2 OUT     If rescheduling, when is the original appointment: 3-2-2021

## 2021-03-02 ENCOUNTER — APPOINTMENT (OUTPATIENT)
Dept: LAB | Facility: HOSPITAL | Age: 21
End: 2021-03-02

## 2021-03-18 ENCOUNTER — HOSPITAL ENCOUNTER (OUTPATIENT)
Dept: FAMILY MEDICINE CLINIC | Facility: CLINIC | Age: 21
Discharge: HOME | End: 2021-03-18
Attending: NURSE PRACTITIONER

## 2021-03-18 ENCOUNTER — OFFICE VISIT CONVERTED (OUTPATIENT)
Dept: FAMILY MEDICINE CLINIC | Facility: CLINIC | Age: 21
End: 2021-03-18
Attending: NURSE PRACTITIONER

## 2021-03-19 LAB
C TRACH RRNA CVX QL NAA+PROBE: NEGATIVE
N GONORRHOEA DNA SPEC QL NAA+PROBE: NEGATIVE

## 2021-03-20 LAB — BACTERIA UR CULT: NORMAL

## 2021-03-22 LAB
A VAGINAE DNA VAG QL NAA+PROBE: ABNORMAL SCORE
CONV BVAB 2: ABNORMAL SCORE
MEGASPHAERA 1: ABNORMAL SCORE

## 2021-03-29 ENCOUNTER — HOSPITAL ENCOUNTER (OUTPATIENT)
Dept: FAMILY MEDICINE CLINIC | Facility: CLINIC | Age: 21
Discharge: HOME | End: 2021-03-29
Attending: NURSE PRACTITIONER

## 2021-03-30 ENCOUNTER — OFFICE VISIT (OUTPATIENT)
Dept: ONCOLOGY | Facility: CLINIC | Age: 21
End: 2021-03-30

## 2021-03-30 ENCOUNTER — LAB (OUTPATIENT)
Dept: LAB | Facility: HOSPITAL | Age: 21
End: 2021-03-30

## 2021-03-30 VITALS
HEART RATE: 72 BPM | HEIGHT: 63 IN | WEIGHT: 158.3 LBS | TEMPERATURE: 98.2 F | SYSTOLIC BLOOD PRESSURE: 117 MMHG | BODY MASS INDEX: 28.05 KG/M2 | RESPIRATION RATE: 16 BRPM | OXYGEN SATURATION: 98 % | DIASTOLIC BLOOD PRESSURE: 77 MMHG

## 2021-03-30 DIAGNOSIS — R23.3 EASY BRUISING: ICD-10-CM

## 2021-03-30 DIAGNOSIS — D50.9 IRON DEFICIENCY ANEMIA, UNSPECIFIED IRON DEFICIENCY ANEMIA TYPE: ICD-10-CM

## 2021-03-30 DIAGNOSIS — D50.0 IRON DEFICIENCY ANEMIA DUE TO CHRONIC BLOOD LOSS: Primary | ICD-10-CM

## 2021-03-30 DIAGNOSIS — D68.51 FACTOR 5 LEIDEN MUTATION, HETEROZYGOUS (HCC): Primary | ICD-10-CM

## 2021-03-30 LAB
BASOPHILS # BLD AUTO: 0.07 10*3/MM3 (ref 0–0.2)
BASOPHILS NFR BLD AUTO: 0.8 % (ref 0–1.5)
DEPRECATED RDW RBC AUTO: 39.8 FL (ref 37–54)
EOSINOPHIL # BLD AUTO: 0.37 10*3/MM3 (ref 0–0.4)
EOSINOPHIL NFR BLD AUTO: 4 % (ref 0.3–6.2)
ERYTHROCYTE [DISTWIDTH] IN BLOOD BY AUTOMATED COUNT: 11.9 % (ref 12.3–15.4)
FERRITIN SERPL-MCNC: 203.6 NG/ML (ref 11–207)
HCT VFR BLD AUTO: 41.8 % (ref 34–46.6)
HGB BLD-MCNC: 13.9 G/DL (ref 12–15.9)
IMM GRANULOCYTES # BLD AUTO: 0.02 10*3/MM3 (ref 0–0.05)
IMM GRANULOCYTES NFR BLD AUTO: 0.2 % (ref 0–0.5)
IRON 24H UR-MRATE: 77 MCG/DL (ref 37–145)
IRON SATN MFR SERPL: 24 % (ref 14–48)
LYMPHOCYTES # BLD AUTO: 2.66 10*3/MM3 (ref 0.7–3.1)
LYMPHOCYTES NFR BLD AUTO: 28.5 % (ref 19.6–45.3)
MCH RBC QN AUTO: 30.5 PG (ref 26.6–33)
MCHC RBC AUTO-ENTMCNC: 33.3 G/DL (ref 31.5–35.7)
MCV RBC AUTO: 91.7 FL (ref 79–97)
MONOCYTES # BLD AUTO: 0.49 10*3/MM3 (ref 0.1–0.9)
MONOCYTES NFR BLD AUTO: 5.3 % (ref 5–12)
NEUTROPHILS NFR BLD AUTO: 5.72 10*3/MM3 (ref 1.7–7)
NEUTROPHILS NFR BLD AUTO: 61.2 % (ref 42.7–76)
NRBC BLD AUTO-RTO: 0 /100 WBC (ref 0–0.2)
PLATELET # BLD AUTO: 212 10*3/MM3 (ref 140–450)
PMV BLD AUTO: 9.9 FL (ref 6–12)
RBC # BLD AUTO: 4.56 10*6/MM3 (ref 3.77–5.28)
TIBC SERPL-MCNC: 316 MCG/DL (ref 249–505)
TRANSFERRIN SERPL-MCNC: 226 MG/DL (ref 200–360)
WBC # BLD AUTO: 9.33 10*3/MM3 (ref 3.4–10.8)

## 2021-03-30 PROCEDURE — 82728 ASSAY OF FERRITIN: CPT

## 2021-03-30 PROCEDURE — 83540 ASSAY OF IRON: CPT

## 2021-03-30 PROCEDURE — 85025 COMPLETE CBC W/AUTO DIFF WBC: CPT

## 2021-03-30 PROCEDURE — 84466 ASSAY OF TRANSFERRIN: CPT

## 2021-03-30 PROCEDURE — 36415 COLL VENOUS BLD VENIPUNCTURE: CPT

## 2021-03-30 PROCEDURE — 99213 OFFICE O/P EST LOW 20 MIN: CPT | Performed by: INTERNAL MEDICINE

## 2021-03-30 NOTE — PROGRESS NOTES
"Subjective     CHIEF COMPLAINT:      Chief Complaint   Patient presents with   • Annual Exam     no concerns       HISTORY OF PRESENT ILLNESS:     Sweetie Hernandez is a 20 y.o. female patient who returns today for follow up on her anemia and factor V Leiden mutation.  She returns today for follow-up and reports no new symptoms.  She is trying to become pregnant.  She reports fatigue.  However, energy level is still better than the way she felt before she had the iron infusions in 2019.      ROS:  Relevant ROS is in the HPI.     Past medical, surgical, social and family history were reviewed.     MEDICATIONS:  No current outpatient medications on file.    Objective   VITAL SIGNS:     Vitals:    03/30/21 1553   BP: 117/77   Pulse: 72   Resp: 16   Temp: 98.2 °F (36.8 °C)   TempSrc: Temporal   SpO2: 98%   Weight: 71.8 kg (158 lb 4.8 oz)   Height: 160 cm (62.99\")  Comment: new ht   PainSc: 0-No pain     Body mass index is 28.05 kg/m².     Wt Readings from Last 5 Encounters:   03/30/21 71.8 kg (158 lb 4.8 oz)   10/05/20 71.5 kg (157 lb 9.6 oz)   03/02/20 73.7 kg (162 lb 8 oz)   10/15/19 75.8 kg (167 lb)   09/06/19 78.1 kg (172 lb 3.2 oz)       PHYSICAL EXAMINATION:   GENERAL: The patient appears in good general condition, not in acute distress.   SKIN: Small area of ecchymosis over the left calf.  EYES: No jaundice.  CHEST: Normal respiratory effort.   CVS: No edema.  ABDOMEN: Nondistended.  EXTREMITIES: No size difference between the legs.  No calf tenderness.    DIAGNOSTIC DATA:     Results from last 7 days   Lab Units 03/30/21  1546   WBC 10*3/mm3 9.33   NEUTROS ABS 10*3/mm3 5.72   HEMOGLOBIN g/dL 13.9   HEMATOCRIT % 41.8   PLATELETS 10*3/mm3 212     Component      Latest Ref Rng & Units 3/2/2020 10/5/2020 3/30/2021   Iron      37 - 145 mcg/dL 79 95 77   Iron Saturation      14 - 48 % 22 27 24   Transferrin      200 - 360 mg/dL 253 239 226   TIBC      249 - 505 mcg/dL 354 356 316   Ferritin      11.00 - 207.00 ng/mL " 316.30 (H) 160.40 (H) 203.60       Component      Latest Ref Rng & Units 3/2/2020   Factor VIII Activity      56 - 140 % 92   Von Willebrand Ag      50 - 200 % 88   VWF Activity      50 - 200 % 91   Protime      11.7 - 14.2 Seconds 12.4   INR      0.90 - 1.10 0.95   Collagen/Epinephrine      68 - 148 Seconds 99   Collagen/ADP      52 - 122 Seconds 111   PTT      22.7 - 35.4 seconds 32.1   Fibrinogen      219 - 464 mg/dL 500 (H)       Assessment/Plan   1.  Factor V Leiden heterozygous mutation.    · The patient has no prior history of deep vein thrombosis, thrombophlebitis or pulmonary embolism.   · Family history is positive for thrombosis in her maternal grandfather.   · The patient did well with 2 pregnancies. She was given Lovenox prophylactically for 3 days after her normal vaginal delivery in 2017 and for 6 weeks after her  in 2018.  · For contraception, we recommended IUD but the patient could not have it placed.  She decided to have Depo-Provera but did not tolerate it.  After that, she had Nexplanon but she did not tolerate Nexplanon implant that was placed in .  · Patient is not on any contraceptive methods as she is now trying to become pregnant.    2.  Iron deficiency anemia.    · Lowest hemoglobin in 2017 was 7.4 on 2017 and in 2018 was 8.8 on 2018.  · She did not tolerate oral iron therapy.  She has GERD symptoms.    · She developed upset stomach after taking oral iron.   · Patient was given IV Injectafer on 2019 and 2019.  · Hemoglobin improved to 14.6 on 3/2/2020 with improvement in her iron stores.  · Hemoglobin is today at 13.9.  She has adequate iron stores.    3.  Easy bruisability.    · Patient reported developing bruises over her upper extremities without any preceding trauma.    · She does not take aspirin or NSAIDs.  She is no longer taking Cymbalta.    · Bleeding disorder workup on 3/2/2020 showed no evidence of a bleeding disorder.    PLAN:    1.   Patient does not need IV iron at this point.  2.  Return in 6 months for CBC ferritin iron panel with RN review.  3.  Follow-up in 1 year with CBC and iron studies.  4.  I asked the patient to notify us when she becomes pregnant.  When she becomes pregnant, I would recommend starting aspirin 81 mg a day during pregnancy.  After delivery, I would recommend Lovenox at a prophylactic dose of 40 mg subcutaneously daily for 6 weeks to be started 24-48 hours after delivery.        Myra Pierre MD  03/30/21

## 2021-03-31 LAB
AMPICILLIN SUSC ISLT: <=0.25
AMPICILLIN SUSC ISLT: <=2
AMPICILLIN+SULBAC SUSC ISLT: <=2
BACTERIA UR CULT: ABNORMAL
CEFAZOLIN SUSC ISLT: <=4
CEFEPIME SUSC ISLT: <=0.12
CEFOTAXIME SUSC ISLT: <=0.12
CEFTAZIDIME SUSC ISLT: <=1
CEFTRIAXONE SUSC ISLT: <=0.12
CEFTRIAXONE SUSC ISLT: <=0.25
CIPROFLOXACIN SUSC ISLT: <=0.25
ERTAPENEM SUSC ISLT: <=0.12
GENTAMICIN SUSC ISLT: <=1
LEVOFLOXACIN SUSC ISLT: 0.5
LEVOFLOXACIN SUSC ISLT: <=0.12
NITROFURANTOIN SUSC ISLT: <=16
PENICILLIN G SUSC ISLT: <=0.06
PIP+TAZO SUSC ISLT: <=4
TETRACYCLINE SUSC ISLT: >=16
TIGECYCLINE SUSC ISLT: <=0.06
TMP SMX SUSC ISLT: <=20
TOBRAMYCIN SUSC ISLT: <=1
VANCOMYCIN SUSC ISLT: 0.5

## 2021-05-05 ENCOUNTER — OFFICE VISIT CONVERTED (OUTPATIENT)
Dept: FAMILY MEDICINE CLINIC | Facility: CLINIC | Age: 21
End: 2021-05-05
Attending: NURSE PRACTITIONER

## 2021-05-05 ENCOUNTER — CONVERSION ENCOUNTER (OUTPATIENT)
Dept: FAMILY MEDICINE CLINIC | Facility: CLINIC | Age: 21
End: 2021-05-05

## 2021-05-09 VITALS
DIASTOLIC BLOOD PRESSURE: 80 MMHG | HEIGHT: 62 IN | TEMPERATURE: 96.8 F | SYSTOLIC BLOOD PRESSURE: 130 MMHG | WEIGHT: 156 LBS | OXYGEN SATURATION: 97 % | BODY MASS INDEX: 28.71 KG/M2 | HEART RATE: 63 BPM

## 2021-05-09 VITALS
HEART RATE: 85 BPM | OXYGEN SATURATION: 99 % | DIASTOLIC BLOOD PRESSURE: 60 MMHG | BODY MASS INDEX: 30.01 KG/M2 | HEIGHT: 63 IN | WEIGHT: 169.37 LBS | SYSTOLIC BLOOD PRESSURE: 118 MMHG | TEMPERATURE: 97.3 F

## 2021-05-09 VITALS
SYSTOLIC BLOOD PRESSURE: 120 MMHG | OXYGEN SATURATION: 98 % | BODY MASS INDEX: 31.64 KG/M2 | DIASTOLIC BLOOD PRESSURE: 80 MMHG | HEART RATE: 84 BPM | WEIGHT: 173 LBS | TEMPERATURE: 96.6 F

## 2021-05-09 VITALS
HEIGHT: 63 IN | OXYGEN SATURATION: 98 % | DIASTOLIC BLOOD PRESSURE: 62 MMHG | SYSTOLIC BLOOD PRESSURE: 118 MMHG | WEIGHT: 178 LBS | HEART RATE: 99 BPM | TEMPERATURE: 97 F | BODY MASS INDEX: 31.54 KG/M2

## 2021-05-09 VITALS
SYSTOLIC BLOOD PRESSURE: 140 MMHG | OXYGEN SATURATION: 98 % | HEART RATE: 73 BPM | WEIGHT: 171 LBS | TEMPERATURE: 97.8 F | DIASTOLIC BLOOD PRESSURE: 90 MMHG | BODY MASS INDEX: 31.28 KG/M2

## 2021-05-09 VITALS
BODY MASS INDEX: 29.58 KG/M2 | DIASTOLIC BLOOD PRESSURE: 80 MMHG | WEIGHT: 167 LBS | OXYGEN SATURATION: 98 % | TEMPERATURE: 98.5 F | SYSTOLIC BLOOD PRESSURE: 120 MMHG | HEART RATE: 80 BPM

## 2021-05-09 VITALS
BODY MASS INDEX: 29.95 KG/M2 | OXYGEN SATURATION: 99 % | DIASTOLIC BLOOD PRESSURE: 70 MMHG | HEIGHT: 63 IN | SYSTOLIC BLOOD PRESSURE: 118 MMHG | HEART RATE: 95 BPM | TEMPERATURE: 97 F | WEIGHT: 169 LBS

## 2021-05-09 VITALS
HEART RATE: 82 BPM | TEMPERATURE: 96.9 F | WEIGHT: 173 LBS | OXYGEN SATURATION: 99 % | SYSTOLIC BLOOD PRESSURE: 120 MMHG | DIASTOLIC BLOOD PRESSURE: 80 MMHG | BODY MASS INDEX: 31.64 KG/M2

## 2021-05-09 VITALS
TEMPERATURE: 97.4 F | SYSTOLIC BLOOD PRESSURE: 110 MMHG | BODY MASS INDEX: 27.46 KG/M2 | DIASTOLIC BLOOD PRESSURE: 70 MMHG | OXYGEN SATURATION: 99 % | HEART RATE: 89 BPM | WEIGHT: 155 LBS | HEIGHT: 63 IN

## 2021-05-09 VITALS
OXYGEN SATURATION: 98 % | HEART RATE: 94 BPM | HEIGHT: 63 IN | SYSTOLIC BLOOD PRESSURE: 106 MMHG | WEIGHT: 160.12 LBS | BODY MASS INDEX: 28.37 KG/M2 | DIASTOLIC BLOOD PRESSURE: 62 MMHG | TEMPERATURE: 96.6 F

## 2021-05-09 VITALS
TEMPERATURE: 97.7 F | WEIGHT: 174.25 LBS | BODY MASS INDEX: 30.88 KG/M2 | SYSTOLIC BLOOD PRESSURE: 110 MMHG | DIASTOLIC BLOOD PRESSURE: 70 MMHG | HEART RATE: 124 BPM | OXYGEN SATURATION: 98 % | HEIGHT: 63 IN

## 2021-05-09 VITALS
OXYGEN SATURATION: 99 % | WEIGHT: 169 LBS | SYSTOLIC BLOOD PRESSURE: 120 MMHG | HEART RATE: 92 BPM | TEMPERATURE: 97.3 F | BODY MASS INDEX: 29.21 KG/M2 | DIASTOLIC BLOOD PRESSURE: 80 MMHG

## 2021-05-09 VITALS
BODY MASS INDEX: 30.11 KG/M2 | DIASTOLIC BLOOD PRESSURE: 80 MMHG | SYSTOLIC BLOOD PRESSURE: 120 MMHG | WEIGHT: 170 LBS | OXYGEN SATURATION: 98 % | HEART RATE: 98 BPM

## 2021-05-09 VITALS
BODY MASS INDEX: 29.94 KG/M2 | HEART RATE: 117 BPM | OXYGEN SATURATION: 96 % | DIASTOLIC BLOOD PRESSURE: 80 MMHG | WEIGHT: 169 LBS | SYSTOLIC BLOOD PRESSURE: 120 MMHG

## 2021-05-09 VITALS
DIASTOLIC BLOOD PRESSURE: 70 MMHG | OXYGEN SATURATION: 98 % | HEART RATE: 68 BPM | SYSTOLIC BLOOD PRESSURE: 110 MMHG | WEIGHT: 157 LBS | TEMPERATURE: 97.7 F | BODY MASS INDEX: 27.82 KG/M2

## 2021-05-09 VITALS — OXYGEN SATURATION: 97 % | TEMPERATURE: 97.9 F | HEART RATE: 80 BPM

## 2021-05-09 VITALS
OXYGEN SATURATION: 100 % | WEIGHT: 171 LBS | BODY MASS INDEX: 30.3 KG/M2 | TEMPERATURE: 96.8 F | HEART RATE: 101 BPM | DIASTOLIC BLOOD PRESSURE: 80 MMHG | SYSTOLIC BLOOD PRESSURE: 120 MMHG

## 2021-05-09 VITALS — HEART RATE: 88 BPM | OXYGEN SATURATION: 97 % | TEMPERATURE: 99.4 F

## 2021-05-15 VITALS — BODY MASS INDEX: 30.21 KG/M2 | RESPIRATION RATE: 14 BRPM | WEIGHT: 170.5 LBS | HEIGHT: 63 IN

## 2021-05-24 ENCOUNTER — OFFICE VISIT CONVERTED (OUTPATIENT)
Dept: FAMILY MEDICINE CLINIC | Facility: CLINIC | Age: 21
End: 2021-05-24
Attending: NURSE PRACTITIONER

## 2021-06-05 NOTE — PROGRESS NOTES
Progress Note      Patient Name: Sweetie Hernandez   Patient ID: [id number]   Sex: Female   YOB: 2000    Primary Care Provider: Adelina MCKEON   Referring Provider: Adelina MCKEON    Visit Date: May 5, 2021    Provider: LINDA Aguilar   Location: [facility]   Location Address: [address] [place] [address] [address], [place]  [address]   Location Phone: [phone]          Chief Complaint     upset stomach, nausea, food does not taste good (she still has a sense of taste), and a cough       History Of Present Illness  Sweetie Hernandez is a 20 year old /White female who presents for evaluation and treatment of:      abnormal taste in mouth and nausea x 1 month - admits to soft stool without diarrhea - ulcerative sores to the lower lip x 6 months     Pt was recently on multiple antibiotics due to BV and UTI     Pt denies abdominal pain    Ulcerative lip lesion: pt states she has been to dentist recently and no concern was expressed by Dentist - pt state noted about 6 months ago and denies pain - asks about possible mouth cancer    Pt is sexually active and does not use protection - LMP: 4/7/21       Past Medical History  Disease Name Date Onset Notes   Anxiety --  --    Depression --  --    GERD (gastroesophageal reflux disease) --  --    Suicide Attempt; unspecified means --  --          Past Surgical History  Procedure Name Date Notes   EYE SURGERY --  --    Tonsillectomy and adenoidectomy in patient age 12 or over --  --          Allergy List  Allergen Name Date Reaction Notes   hydrocodone-acetaminophen --  --  --    naproxen --  lip swelling --        Allergies Reconciled  Family Medical History  Disease Name Relative/Age Notes   Depression Father/  Mother/   Father; Mother   DM Type I Mother/   Mother   Hyperlipidemia Mother/   Mother   Hypertension Mother/   Mother         Social History  Finding Status Start/Stop Quantity Notes   Alcohol Never --/-- --  never  drinks alcohol, 7 or less drinks per week   Exercises regularly --  --/-- --  0 times per week   Recreational Drug Use Never --/-- --  never used   Second hand smoke exposure Unknown --/-- --  yes   Tobacco Current every day --/21 1 PPD 07/13/2020 - last cigarette June 21, 2020 current every day smoker, smokes less than 1 pack per day, for less than 5 years, never uses other tobacco products   Uses seatbelts --  --/-- --  yes         Immunizations  NameDate Admin Mfg Trade Name Lot Number Route Inj VIS Given VIS Publication   [id number]/26/2018 [facility] FLUAR[employer] [id number] IM RA 10/26/2018 08/07/2015   Comments: [id number]         Review of Systems  · Constitutional  o Denies  o : fever, headache, chills, body aches  · HENT  o Denies  o : sore throat  · Cardiovascular  o Denies  o : chest pain, palpitations  · Respiratory  o Admits  o : cough  o Denies  o : shortness of breath, wheezing      Vitals  Date Time BP Position Site L\R Cuff Size HR RR TEMP (F) WT  HT  BMI kg/m2 BSA m2 O2 Sat FR L/min FiO2 HC       05/05/2021 01:02 PM      69 - R  97.4     97 %  21%          Physical Examination  · Constitutional  o Appearance  o : well developed, well-nourished, in no acute distress  · Eyes  o Conjunctivae  o : conjunctivae normal  o Pupils and Irises  o : pupils equal and round, pupils reactive to light bilaterally  · Ears, Nose, Mouth and Throat  o Ears  o :   § External Ears  § : no auricle tenderness to palpation present  § Otoscopic Examination  § : left tympanic membrane appearance within normal limits, DNA right TM  § Hearing  § : response to sound normal, no tinnitus  o Oral Cavity  o :   § Lips  § : lip ulcerative lesion present to left lower inner lip  o Throat  o :   § Oropharynx  § : no inflammation or lesions present  · Neck  o Inspection/Palpation  o : supple  o Thyroid  o : no thyromegaly  · Respiratory  o Respiratory Effort  o : breathing unlabored  o Auscultation of Lungs  o : clear to  ascultation  · Cardiovascular  o Heart  o :   § Auscultation of Heart  § : regular rate and rhythm  o Peripheral Vascular System  o :   § Extremities  § : no edema  · Gastrointestinal  o Abdominal Examination  o :   § Abdomen  § : bowel sounds +, soft  · Lymphatic  o Neck  o : no lymphadenopathy present  · Musculoskeletal  o General  o :   § General Musculoskeletal  § : No joint swelling or deformity. Muscle tone, strength, and development grossly normal.  · Skin and Subcutaneous Tissue  o General Inspection  o : NL Tone  · Neurologic  o Gait and Station  o :   § Gait Screening  § : normal gait  · Psychiatric  o Mood and Affect  o : mood normal, affect appropriate              Assessment  · Nausea     787.02/R11.0  · Stomatitis, ulcerative     528.00/K12.1  Will refer to Oral Surgeon with no improvement for further evaluation.   · Oral candidiasis     112.0/B37.0      Plan  · Orders  o IOP - Urine Pregnancy Test (58851) - 787.02/R11.0 - 05/05/2021   Negative  o ACO-17: Screened for tobacco use AND received tobacco cessation intervention (4004F) - - 05/05/2021  o ACO-39: Current medications updated and reviewed (1159F, ) - - 05/05/2021  · Medications  o Probiotic 20 billion cell oral capsule   SIG: take 1 capsule by oral route 2 times a day for 14 days   DISP: (28) Capsule with 0 refills  Prescribed on 05/05/2021     o nystatin 100,000 unit/mL oral suspension   SIG: swish and swallow 5 milliliters (500,000 unit) by oral route 4 times per day   DISP: (140) Milliliter with 0 refills  Prescribed on 05/05/2021     o Medications have been Reconciled  o Transition of Care or Provider Policy  · Instructions  o Patient was educated/instructed on their diagnosis, treatment and medications prior to discharge from the clinic today.  · Disposition  o Follow up as needed.            Electronically Signed by: LINDA Aguilar -Author on May 5, 2021 02:00:42 PM

## 2021-06-05 NOTE — PROGRESS NOTES
Progress Note      Patient Name: Sweetie Hernandez   Patient ID: [id number]   Sex: Female   YOB: 2000    Primary Care Provider: Adelina MCKEON   Referring Provider: Adelina MCKEON    Visit Date: May 24, 2021    Provider: LINDA Patino   Location: [facility]   Location Address: [address] [place] [address] [place]  [address]   Location Phone: [phone]          Chief Complaint     wants to start Chantix       History Of Present Illness  Sweetie Hernandez is a 20 year old /White female who presents for evaluation and treatment of:      wants to start Chantix - has taken in the past and it did help her stop smoking, but she only took it for one month d/t it did cause nausea -     She is currently smoking 1 PPD - she started smoking at age 8 - sneaking cigarettes - and started smoking 1 PPD at age 15 -     She was having some psychiatric troubles after the onset of COVID - she has been going to [facility] and just finished her psychological testing - has follow up on 6/24 for those results - she is currently taking amitriptyline and that is helping her - she doesn't feel like she over-thinks as much and she is no longer in the relationship that she was in; they were 'toxic for each other'.    Additionally, patient reports that she was recently treated for 'thrush' with nystatin swish and swallow for about two weeks - she took all of the medication as prescribed and her symptoms have not resolved - first noted symptoms about 6 months ago following a dental appointment - patient denies any hx of HSV - she denies any ulcerative lesions - just 'white patches' on the inside of her lower lip. denies any pain. she is most concerned for cancer d/t she is a smoker.       Past Medical History  Disease Name Date Onset Notes   Anxiety --  --    Depression --  --    GERD (gastroesophageal reflux disease) --  --    Suicide Attempt; unspecified means --  --          Past  Surgical History  Procedure Name Date Notes   EYE SURGERY --  --    Tonsillectomy and adenoidectomy in patient age 12 or over --  --          Medication List  Name Date Started Instructions   amitriptyline oral  --          Allergy List  Allergen Name Date Reaction Notes   hydrocodone-acetaminophen --  --  --    naproxen --  lip swelling --          Family Medical History  Disease Name Relative/Age Notes   Depression Father/  Mother/   Father; Mother   DM Type I Mother/   Mother   Hyperlipidemia Mother/   Mother   Hypertension Mother/   Mother         Social History  Finding Status Start/Stop Quantity Notes   Alcohol Never --/-- --  never drinks alcohol, 7 or less drinks per week   Exercises regularly --  --/-- --  0 times per week   Recreational Drug Use Never --/-- --  never used   Second hand smoke exposure Unknown --/-- --  yes   Tobacco Current every day --/21 1 PPD 07/13/2020 - last cigarette June 21, 2020 current every day smoker, smokes less than 1 pack per day, for less than 5 years, never uses other tobacco products   Uses seatbelts --  --/-- --  yes         Immunizations  NameDate Admin Mfg Trade Name Lot Number Route Inj VIS Given VIS Publication   Xcoufilfi02/26/2018 [facility] FLUARIX [id number] IM RA 10/26/2018 08/07/2015   Comments: [id number]         Review of Systems  · Constitutional  o Admits  o : good general health lately  o Denies  o : fever, chills, body aches  · HENT  o Admits  o : oral lesions, weird taste in mouth  o Denies  o : gingival bleeding, sore throat, breath odor, dry mouth, dysphagia  · Cardiovascular  o Denies  o : chest pain, palpitations  · Respiratory  o Denies  o : shortness of breath, cough  · Gastrointestinal  o Denies  o : nausea, vomiting, abdominal pain  · Neurologic  o Denies  o : dizziness  · Psychiatric  o Admits  o : additional psychiatric symptoms except as noted in the HPI      Vitals  Date Time BP Position Site L\R Cuff Size HR RR TEMP (F) WT  HT  BMI kg/m2 BSA m2 O2  Sat FR L/min FiO2 HC       05/24/2021 10:32 /80 Sitting    77 - R  98 149lbs 0oz    97 %            Physical Examination  · Constitutional  o Appearance  o : well developed, well-nourished, in no acute distress  · Ears, Nose, Mouth and Throat  o Oral Cavity  o :   § Oral Mucosa  § : oral mucosa is overall normal in appearance - no appreciable white plaques present on exam - , no unusual breath odor present - she does appear to have some peeling of the oral mucosa on the inner aspect of the lower lip  § Lips  § : lip appearance normal  § Teeth  § : normal dentition for age  § Gums  § : gums pink, non-swollen, no bleeding present  § Tongue  § : tongue appearance normal aside from a light brown coating  § Palate  § : hard palate normal, soft palate appearance normal  · Neck  o Inspection/Palpation  o : supple  · Respiratory  o Respiratory Effort  o : breathing unlabored  o Auscultation of Lungs  o : clear to auscultation   · Cardiovascular  o Heart  o :   § Auscultation of Heart  § : regular rate, normal rhythm, no murmurs   o Peripheral Vascular System  o :   § Extremities  § : no edema  · Musculoskeletal  o General  o :   § General Musculoskeletal  § : Muscle tone, strength, and development grossly normal.  · Skin and Subcutaneous Tissue  o General Inspection  o : no lesions present, no areas of discoloration, skin turgor normal, texture normal  · Neurologic  o Gait and Station  o :   § Gait Screening  § : normal gait  · Psychiatric  o Mood and Affect  o : mood normal, affect appropriate          Assessment  · Nicotine dependence     305.1/F17.200  · Stomatitis     528.00/K12.1      Plan  · Orders  o ACO-39: Current medications updated and reviewed (1159F, ) - - 05/24/2021  o ENT CONSULTATION ([facility]) - 528.00/K12.1 - 05/24/2021   [facility] [facility] preferred by patient  · Medications  o Chantix Starting Month Box 0.5 mg (11)- 1 mg (42) oral tablets,dose pack   SIG: take as directed   DISP: (1) Dose  Pack with 0 refills  Prescribed on 05/24/2021     o Chantix Continuing Month Box 1 mg oral tablet   SIG: take 1 tablet (1 mg) with glass of water by oral route 2 times per day after meals for 12 weeks   DISP: (168) Tablet with 0 refills  Prescribed on 05/24/2021     o Medications have been Reconciled  o Transition of Care or Provider Policy  · Instructions  o *Form of nicotine being used: tobacco/cigarettes  o Patient was strongly encouraged to discontinue use of any nicotine containing product or minimize the use of the product. She has been advised that she can smoke up to 35 days while taking Chantix, but after that we would prefer complete cessation of smoking.   o Patient was educated/instructed on their diagnosis, treatment and medications prior to discharge from the clinic today. Will re-initiate Chantix for her - RX for four months, but advised patient that we can continue for two additional months if desired - to monitor for s/e of nausea and psychiatric changes - to notify immediately if any concerns/issues. Will also refer her to ENT for her concerns - patient denies chewing on her bottom lip, but was noted to bite her bottom lip frequently on exam today.             Electronically Signed by: LINDA Patino -Author on May 24, 2021 11:37:47 AM

## 2021-06-21 ENCOUNTER — HOSPITAL ENCOUNTER (EMERGENCY)
Facility: HOSPITAL | Age: 21
Discharge: HOME | End: 2021-06-22
Attending: EMERGENCY MEDICINE | Admitting: EMERGENCY MEDICINE

## 2021-06-21 VITALS
WEIGHT: 152.56 LBS | SYSTOLIC BLOOD PRESSURE: 125 MMHG | BODY MASS INDEX: 27.03 KG/M2 | HEIGHT: 63 IN | DIASTOLIC BLOOD PRESSURE: 80 MMHG | TEMPERATURE: 98.2 F | RESPIRATION RATE: 16 BRPM | HEART RATE: 73 BPM | OXYGEN SATURATION: 97 %

## 2021-06-21 DIAGNOSIS — F32.A DEPRESSION, UNSPECIFIED DEPRESSION TYPE: Primary | ICD-10-CM

## 2021-06-21 DIAGNOSIS — R45.851 SUICIDE IDEATION: ICD-10-CM

## 2021-06-21 PROCEDURE — 80307 DRUG TEST PRSMV CHEM ANLYZR: CPT | Performed by: EMERGENCY MEDICINE

## 2021-06-21 PROCEDURE — 99283 EMERGENCY DEPT VISIT LOW MDM: CPT

## 2021-06-21 PROCEDURE — 84439 ASSAY OF FREE THYROXINE: CPT | Performed by: EMERGENCY MEDICINE

## 2021-06-21 PROCEDURE — 84443 ASSAY THYROID STIM HORMONE: CPT | Performed by: EMERGENCY MEDICINE

## 2021-06-21 PROCEDURE — 84703 CHORIONIC GONADOTROPIN ASSAY: CPT | Performed by: EMERGENCY MEDICINE

## 2021-06-21 PROCEDURE — 80179 DRUG ASSAY SALICYLATE: CPT | Performed by: EMERGENCY MEDICINE

## 2021-06-21 PROCEDURE — 80143 DRUG ASSAY ACETAMINOPHEN: CPT | Performed by: EMERGENCY MEDICINE

## 2021-06-21 PROCEDURE — 80053 COMPREHEN METABOLIC PANEL: CPT | Performed by: EMERGENCY MEDICINE

## 2021-06-21 PROCEDURE — 82077 ASSAY SPEC XCP UR&BREATH IA: CPT | Performed by: EMERGENCY MEDICINE

## 2021-06-21 PROCEDURE — 85025 COMPLETE CBC W/AUTO DIFF WBC: CPT | Performed by: EMERGENCY MEDICINE

## 2021-06-21 PROCEDURE — 36415 COLL VENOUS BLD VENIPUNCTURE: CPT

## 2021-06-21 RX ORDER — SODIUM CHLORIDE 0.9 % (FLUSH) 0.9 %
10 SYRINGE (ML) INJECTION AS NEEDED
Status: DISCONTINUED | OUTPATIENT
Start: 2021-06-21 | End: 2021-06-22 | Stop reason: HOSPADM

## 2021-06-22 LAB
ALBUMIN SERPL-MCNC: 4.7 G/DL (ref 3.5–5.2)
ALBUMIN/GLOB SERPL: 1.9 G/DL
ALP SERPL-CCNC: 70 U/L (ref 39–117)
ALT SERPL W P-5'-P-CCNC: 26 U/L (ref 1–33)
AMPHET+METHAMPHET UR QL: NEGATIVE
ANION GAP SERPL CALCULATED.3IONS-SCNC: 10 MMOL/L (ref 5–15)
APAP SERPL-MCNC: <5 MCG/ML (ref 0–30)
AST SERPL-CCNC: 21 U/L (ref 1–32)
BARBITURATES UR QL SCN: NEGATIVE
BASOPHILS # BLD AUTO: 0.06 10*3/MM3 (ref 0–0.2)
BASOPHILS NFR BLD AUTO: 0.5 % (ref 0–1.5)
BENZODIAZ UR QL SCN: NEGATIVE
BILIRUB SERPL-MCNC: 0.2 MG/DL (ref 0–1.2)
BUN SERPL-MCNC: 21 MG/DL (ref 6–20)
BUN/CREAT SERPL: 21.4 (ref 7–25)
CALCIUM SPEC-SCNC: 9.7 MG/DL (ref 8.6–10.5)
CANNABINOIDS SERPL QL: NEGATIVE
CHLORIDE SERPL-SCNC: 105 MMOL/L (ref 98–107)
CO2 SERPL-SCNC: 25 MMOL/L (ref 22–29)
COCAINE UR QL: NEGATIVE
CREAT SERPL-MCNC: 0.98 MG/DL (ref 0.57–1)
DEPRECATED RDW RBC AUTO: 37.6 FL (ref 37–54)
EOSINOPHIL # BLD AUTO: 0.25 10*3/MM3 (ref 0–0.4)
EOSINOPHIL NFR BLD AUTO: 2.1 % (ref 0.3–6.2)
ERYTHROCYTE [DISTWIDTH] IN BLOOD BY AUTOMATED COUNT: 11.5 % (ref 12.3–15.4)
ETHANOL BLD-MCNC: <10 MG/DL (ref 0–10)
ETHANOL UR QL: <0.01 %
GFR SERPL CREATININE-BSD FRML MDRD: 71 ML/MIN/1.73
GLOBULIN UR ELPH-MCNC: 2.5 GM/DL
GLUCOSE BLDC GLUCOMTR-MCNC: 106 MG/DL (ref 70–130)
GLUCOSE SERPL-MCNC: 93 MG/DL (ref 65–99)
HCG SERPL QL: NEGATIVE
HCT VFR BLD AUTO: 42.6 % (ref 34–46.6)
HGB BLD-MCNC: 14.3 G/DL (ref 12–15.9)
HOLD SPECIMEN: NORMAL
IMM GRANULOCYTES # BLD AUTO: 0.03 10*3/MM3 (ref 0–0.05)
IMM GRANULOCYTES NFR BLD AUTO: 0.2 % (ref 0–0.5)
LYMPHOCYTES # BLD AUTO: 1.88 10*3/MM3 (ref 0.7–3.1)
LYMPHOCYTES NFR BLD AUTO: 15.7 % (ref 19.6–45.3)
MCH RBC QN AUTO: 30.1 PG (ref 26.6–33)
MCHC RBC AUTO-ENTMCNC: 33.6 G/DL (ref 31.5–35.7)
MCV RBC AUTO: 89.7 FL (ref 79–97)
METHADONE UR QL SCN: NEGATIVE
MONOCYTES # BLD AUTO: 0.79 10*3/MM3 (ref 0.1–0.9)
MONOCYTES NFR BLD AUTO: 6.6 % (ref 5–12)
NEUTROPHILS NFR BLD AUTO: 74.9 % (ref 42.7–76)
NEUTROPHILS NFR BLD AUTO: 9 10*3/MM3 (ref 1.7–7)
NRBC BLD AUTO-RTO: 0 /100 WBC (ref 0–0.2)
OPIATES UR QL: NEGATIVE
OXYCODONE UR QL SCN: NEGATIVE
PLATELET # BLD AUTO: 220 10*3/MM3 (ref 140–450)
PMV BLD AUTO: 10.2 FL (ref 6–12)
POTASSIUM SERPL-SCNC: 3.7 MMOL/L (ref 3.5–5.2)
PROT SERPL-MCNC: 7.2 G/DL (ref 6–8.5)
RBC # BLD AUTO: 4.75 10*6/MM3 (ref 3.77–5.28)
SALICYLATES SERPL-MCNC: <0.3 MG/DL
SODIUM SERPL-SCNC: 140 MMOL/L (ref 136–145)
T4 FREE SERPL-MCNC: 1.19 NG/DL (ref 0.93–1.7)
TSH SERPL DL<=0.05 MIU/L-ACNC: 2.72 UIU/ML (ref 0.27–4.2)
WBC # BLD AUTO: 12.01 10*3/MM3 (ref 3.4–10.8)
WHOLE BLOOD HOLD SPECIMEN: NORMAL

## 2021-06-22 PROCEDURE — 82962 GLUCOSE BLOOD TEST: CPT

## 2021-06-22 RX ORDER — OXCARBAZEPINE 300 MG/1
300 TABLET, FILM COATED ORAL 2 TIMES DAILY
COMMUNITY
End: 2022-01-17

## 2021-06-22 RX ORDER — VARENICLINE TARTRATE 0.5 MG/1
0.5 TABLET, FILM COATED ORAL 2 TIMES DAILY
COMMUNITY
End: 2021-07-01 | Stop reason: SDUPTHER

## 2021-06-22 RX ORDER — PRENATAL VIT NO.126/IRON/FOLIC 28MG-0.8MG
TABLET ORAL DAILY
COMMUNITY
End: 2021-11-12
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
## 2021-06-24 RX ORDER — DULOXETIN HYDROCHLORIDE 20 MG/1
CAPSULE, DELAYED RELEASE ORAL
COMMUNITY
End: 2021-07-01

## 2021-06-24 RX ORDER — DULOXETIN HYDROCHLORIDE 30 MG/1
CAPSULE, DELAYED RELEASE ORAL
COMMUNITY
End: 2021-07-01

## 2021-07-01 ENCOUNTER — DOCUMENTATION (OUTPATIENT)
Dept: OTOLARYNGOLOGY | Facility: CLINIC | Age: 21
End: 2021-07-01

## 2021-07-01 ENCOUNTER — OFFICE VISIT (OUTPATIENT)
Dept: FAMILY MEDICINE CLINIC | Facility: CLINIC | Age: 21
End: 2021-07-01

## 2021-07-01 ENCOUNTER — OFFICE VISIT (OUTPATIENT)
Dept: OTOLARYNGOLOGY | Facility: CLINIC | Age: 21
End: 2021-07-01

## 2021-07-01 VITALS
OXYGEN SATURATION: 98 % | DIASTOLIC BLOOD PRESSURE: 82 MMHG | TEMPERATURE: 98 F | HEART RATE: 83 BPM | SYSTOLIC BLOOD PRESSURE: 101 MMHG | BODY MASS INDEX: 28.2 KG/M2 | WEIGHT: 159.2 LBS

## 2021-07-01 VITALS — WEIGHT: 157.2 LBS | TEMPERATURE: 98.1 F | HEIGHT: 63 IN | BODY MASS INDEX: 27.85 KG/M2

## 2021-07-01 DIAGNOSIS — N64.4 BREAST TENDERNESS IN FEMALE: ICD-10-CM

## 2021-07-01 DIAGNOSIS — K21.9 GASTROESOPHAGEAL REFLUX DISEASE WITHOUT ESOPHAGITIS: ICD-10-CM

## 2021-07-01 DIAGNOSIS — R43.2 DYSGEUSIA: ICD-10-CM

## 2021-07-01 DIAGNOSIS — N91.2 AMENORRHEA: Primary | ICD-10-CM

## 2021-07-01 DIAGNOSIS — M79.10 MUSCLE PAIN: ICD-10-CM

## 2021-07-01 DIAGNOSIS — J34.89 NASAL VESTIBULITIS: ICD-10-CM

## 2021-07-01 DIAGNOSIS — R43.9 DYSOSMIA: Primary | ICD-10-CM

## 2021-07-01 PROBLEM — N92.6 IRREGULAR MENSTRUAL CYCLE: Status: ACTIVE | Noted: 2021-07-01

## 2021-07-01 PROBLEM — IMO0002 SELF-INFLICTED INJURY: Status: ACTIVE | Noted: 2021-07-01

## 2021-07-01 PROBLEM — D68.2 FACTOR V DEFICIENCY: Status: ACTIVE | Noted: 2021-07-01

## 2021-07-01 LAB
ALBUMIN SERPL-MCNC: 4.2 G/DL (ref 3.5–5.2)
ALBUMIN/GLOB SERPL: 1.7 G/DL
ALP SERPL-CCNC: 61 U/L (ref 39–117)
ALT SERPL W P-5'-P-CCNC: 38 U/L (ref 1–33)
ANION GAP SERPL CALCULATED.3IONS-SCNC: 10.6 MMOL/L (ref 5–15)
AST SERPL-CCNC: 22 U/L (ref 1–32)
B-HCG UR QL: NEGATIVE
BASOPHILS # BLD AUTO: 0.07 10*3/MM3 (ref 0–0.2)
BASOPHILS NFR BLD AUTO: 0.9 % (ref 0–1.5)
BILIRUB SERPL-MCNC: 0.2 MG/DL (ref 0–1.2)
BUN SERPL-MCNC: 14 MG/DL (ref 6–20)
BUN/CREAT SERPL: 19.4 (ref 7–25)
CALCIUM SPEC-SCNC: 9.3 MG/DL (ref 8.6–10.5)
CHLORIDE SERPL-SCNC: 104 MMOL/L (ref 98–107)
CK SERPL-CCNC: 59 U/L (ref 20–180)
CO2 SERPL-SCNC: 25.4 MMOL/L (ref 22–29)
CREAT SERPL-MCNC: 0.72 MG/DL (ref 0.57–1)
DEPRECATED RDW RBC AUTO: 40.6 FL (ref 37–54)
EOSINOPHIL # BLD AUTO: 0.27 10*3/MM3 (ref 0–0.4)
EOSINOPHIL NFR BLD AUTO: 3.7 % (ref 0.3–6.2)
ERYTHROCYTE [DISTWIDTH] IN BLOOD BY AUTOMATED COUNT: 12.1 % (ref 12.3–15.4)
GFR SERPL CREATININE-BSD FRML MDRD: 101 ML/MIN/1.73
GLOBULIN UR ELPH-MCNC: 2.5 GM/DL
GLUCOSE SERPL-MCNC: 62 MG/DL (ref 65–99)
HCG INTACT+B SERPL-ACNC: <0.5 MIU/ML
HCT VFR BLD AUTO: 41 % (ref 34–46.6)
HGB BLD-MCNC: 14 G/DL (ref 12–15.9)
IMM GRANULOCYTES # BLD AUTO: 0.02 10*3/MM3 (ref 0–0.05)
IMM GRANULOCYTES NFR BLD AUTO: 0.3 % (ref 0–0.5)
INTERNAL NEGATIVE CONTROL: NORMAL
INTERNAL POSITIVE CONTROL: NORMAL
LYMPHOCYTES # BLD AUTO: 2.11 10*3/MM3 (ref 0.7–3.1)
LYMPHOCYTES NFR BLD AUTO: 28.6 % (ref 19.6–45.3)
Lab: NORMAL
MAGNESIUM SERPL-MCNC: 2.1 MG/DL (ref 1.6–2.6)
MCH RBC QN AUTO: 31.3 PG (ref 26.6–33)
MCHC RBC AUTO-ENTMCNC: 34.1 G/DL (ref 31.5–35.7)
MCV RBC AUTO: 91.7 FL (ref 79–97)
MONOCYTES # BLD AUTO: 0.54 10*3/MM3 (ref 0.1–0.9)
MONOCYTES NFR BLD AUTO: 7.3 % (ref 5–12)
NEUTROPHILS NFR BLD AUTO: 4.37 10*3/MM3 (ref 1.7–7)
NEUTROPHILS NFR BLD AUTO: 59.2 % (ref 42.7–76)
NRBC BLD AUTO-RTO: 0 /100 WBC (ref 0–0.2)
PLATELET # BLD AUTO: 226 10*3/MM3 (ref 140–450)
PMV BLD AUTO: 11.3 FL (ref 6–12)
POTASSIUM SERPL-SCNC: 3.7 MMOL/L (ref 3.5–5.2)
PROT SERPL-MCNC: 6.7 G/DL (ref 6–8.5)
RBC # BLD AUTO: 4.47 10*6/MM3 (ref 3.77–5.28)
SODIUM SERPL-SCNC: 140 MMOL/L (ref 136–145)
T4 FREE SERPL-MCNC: 0.84 NG/DL (ref 0.93–1.7)
TSH SERPL DL<=0.05 MIU/L-ACNC: 1.13 UIU/ML (ref 0.27–4.2)
WBC # BLD AUTO: 7.38 10*3/MM3 (ref 3.4–10.8)

## 2021-07-01 PROCEDURE — 99203 OFFICE O/P NEW LOW 30 MIN: CPT | Performed by: OTOLARYNGOLOGY

## 2021-07-01 PROCEDURE — 81025 URINE PREGNANCY TEST: CPT | Performed by: FAMILY MEDICINE

## 2021-07-01 PROCEDURE — 80053 COMPREHEN METABOLIC PANEL: CPT | Performed by: FAMILY MEDICINE

## 2021-07-01 PROCEDURE — 84702 CHORIONIC GONADOTROPIN TEST: CPT | Performed by: FAMILY MEDICINE

## 2021-07-01 PROCEDURE — 84439 ASSAY OF FREE THYROXINE: CPT | Performed by: FAMILY MEDICINE

## 2021-07-01 PROCEDURE — 82550 ASSAY OF CK (CPK): CPT | Performed by: FAMILY MEDICINE

## 2021-07-01 PROCEDURE — 84443 ASSAY THYROID STIM HORMONE: CPT | Performed by: FAMILY MEDICINE

## 2021-07-01 PROCEDURE — 83735 ASSAY OF MAGNESIUM: CPT | Performed by: FAMILY MEDICINE

## 2021-07-01 PROCEDURE — 85025 COMPLETE CBC W/AUTO DIFF WBC: CPT | Performed by: FAMILY MEDICINE

## 2021-07-01 PROCEDURE — 31231 NASAL ENDOSCOPY DX: CPT | Performed by: OTOLARYNGOLOGY

## 2021-07-01 PROCEDURE — 99214 OFFICE O/P EST MOD 30 MIN: CPT | Performed by: FAMILY MEDICINE

## 2021-07-01 RX ORDER — FLUTICASONE PROPIONATE 50 MCG
2 SPRAY, SUSPENSION (ML) NASAL DAILY
Qty: 16 G | Refills: 11 | Status: SHIPPED | OUTPATIENT
Start: 2021-07-01 | End: 2022-01-17

## 2021-07-01 RX ORDER — PANTOPRAZOLE SODIUM 40 MG/1
40 TABLET, DELAYED RELEASE ORAL DAILY
Qty: 30 TABLET | Refills: 1 | Status: SHIPPED | OUTPATIENT
Start: 2021-07-01 | End: 2021-08-20

## 2021-07-01 RX ORDER — OLANZAPINE 5 MG/1
TABLET, ORALLY DISINTEGRATING ORAL
COMMUNITY
Start: 2021-06-24 | End: 2022-01-17

## 2021-07-01 NOTE — PROGRESS NOTES
"Patient Name: Sweetie Hernandez   Visit Date: 2021   Patient ID: [id number]  Provider: Roly Pickett MD    Sex: female  Location: [facility]   YOB: 2000  Location Address: [address] [place] [address], [address],?[place]?[address]    Primary Care Provider Robert Pickett MD  Location Phone: [phone]    Referring Provider: No ref. provider found        Chief Complaint  No chief complaint on file.    History of Present Illness  Sweetie Hernandez is a 20 y.o. female who presents to [facility] today as a consult from No ref. provider found for evaluation of multiple complaints.  She tells me she was initially referred for evaluation of white patches in her mouth but that those have resolved.  Currently, she is most concerned about an altered sense of taste and smell after taking what sounds like metronidazole for uti a few months ago.  It seems to make \"meats and gasoline\" smell similar and also has given Chinese food and off putting smell.  She feels like it is also affecting her taste.  However, most of the things she is able to taste and smell normally.  She denies any issues with rhinorrhea, epistaxis, nasal congestion, or facial pain/pressure.  She does have a history of COVID-19 but tells me that this was diagnosed in 2020.  She is not using any nasal sprays or rinses.  She is a former smoker who quit approximately 2.5 weeks ago.  She denies starting any other medications around that time.  She does not take any supplements.      Past Medical History:   Diagnosis Date   • Anxiety    • Bipolar disorder (CMS/[facility])    • Depression     meds prior to pregnancy   • Factor V Leiden (CMS/HCC)    • Preeclampsia    • Seasonal allergies    • Trichimoniasis    • Urinary tract infection    • Urogenital trichomoniasis        Past Surgical History:   Procedure Laterality Date   • ADENOIDECTOMY     •  SECTION N/A 2018    " "Procedure:  SECTION PRIMARY;  Surgeon: Radha Noel MD;  Location: [facility] LABOR DELIVERY;  Service: Obstetrics/Gynecology   • COLONOSCOPY  approx     normal per pt    • EYE SURGERY     • TONSILLECTOMY     • UPPER GASTROINTESTINAL ENDOSCOPY  approx     reflux and scaring per pt          Current Outpatient Medications:   •  OLANZapine zydis (zyPREXA) 5 MG disintegrating tablet, TAKE 1/2 TO 1 TABLET BY MOUTH EVERY NIGHT AT BEDTIME AS NEEDED, Disp: , Rfl:   •  OXcarbazepine (Trileptal) 300 MG tablet, Take 300 mg by mouth 2 (Two) Times a Day., Disp: , Rfl:   •  prenatal vitamin (prenatal, CLASSIC, vitamin) tablet, Take  by mouth Daily., Disp: , Rfl:   •  DULoxetine (Cymbalta) 20 MG capsule, Cymbalta 20 mg oral capsule,delayed release(DR/EC) take 1 capsule by oral route daily   Suspended, Disp: , Rfl:   •  DULoxetine (Cymbalta) 30 MG capsule, Cymbalta 30 mg oral capsule,delayed release(DR/EC) take 1 capsule (30 mg) by oral route once daily   Active, Disp: , Rfl:   •  fluticasone (FLONASE) 50 MCG/ACT nasal spray, 2 sprays into the nostril(s) as directed by provider Daily., Disp: 16 g, Rfl: 11  •  mupirocin (BACTROBAN) 2 % nasal ointment, into the nostril(s) as directed by provider 2 (Two) Times a Day for 14 days. Apply to the nose twice daily, Disp: 3 g, Rfl: 1     Allergies   Allergen Reactions   • Hydrocodone Anaphylaxis   • Naproxen Swelling     Lip swelling       Social History     Tobacco Use   • Smoking status: Former Smoker     Packs/day: 1.00     Years: 0.00     Pack years: 0.00     Types: Cigarettes     Quit date: 2021     Years since quittin.0   • Smokeless tobacco: Never Used   Vaping Use   • Vaping Use: Every day   Substance Use Topics   • Alcohol use: Yes     Alcohol/week: 3.0 standard drinks     Types: 3 Glasses of wine per week   • Drug use: No        Objective     Vital Signs:   Temp 98.1 °F (36.7 °C) (Temporal)   Ht 160 cm (63\")   Wt 71.3 kg (157 lb 3.2 oz)   BMI 27.85 " kg/m²       Physical Exam    General: Well developed, well nourished patient of stated age in no acute distress. Voice is strong and clear.   Head: Normocephalic and atraumatic.  Face: No lesions.  Bilateral parotid and submandibular glands are unremarkable.  Stensen's and Warthin's ducts are productive of clear saliva bilaterally.  House-Brackmann I/VI     bilaterally.   muscles and temporomandibular joint nontender to palpation.  No TMJ crepitus.  Eyes: PERRLA, sclerae anicteric, no conjunctival injection. Extra ocular movements are intact and full. No nystagmus.   Ears: Auricles are normal in appearance. Bilateral external auditory canals are unremarkable. Bilateral tympanic membranes are clear and without effusion. Hearing normal to conversational voice.   Nose: External nose is normal in appearance. Bilateral nares are patent with slightly edematous appearing mucosa.  There is nasal vestibule inflammation on the left.  Septum midline. Turbinates are unremarkable. No lesions.   Oral Cavity: Lips are normal in appearance. Oral mucosa is unremarkable. Gingiva is unremarkable. Normal dentition for age. Tongue is unremarkable with good movement. Hard palate is unremarkable.   Oropharynx: Soft palate is unremarkable with full movement. Uvula is unremarkable. Bilateral tonsils are unremarkable. Posterior oropharynx is unremarkable.    Larynx and hypopharynx: Deferred secondary to gag reflex.  Neck: Supple.  No mass.  Nontender to palpation.  Trachea midline. Thyroid normal size and without nodules to palpation.   Lymphatic: No lymphadenopathy upon palpation.  Respiratory: Clear to auscultation bilaterally, nonlabored respirations    Cardiovascular: RRR, no murmurs, rubs, or gallops,   Psychiatric: Appropriate affect, cooperative   Neurologic: Oriented x 3, strength symmetric in all extremities, Cranial Nerves II-XII are grossly intact to confrontation   Skin: Warm and dry. No rashes.    Procedures      Diagnostic nasal endoscopy:    Indications: Altered sense of smell without ability to visualize the olfactory cleft on anterior rhinoscopy.    Summary: Patient's bilateral nares were decongested and anesthetized with Afrin and lidocaine sprays respectively. After giving the medications ample time to take effect a 0° rigid endoscope was inserted in the bilateral nares revealing a midline nasal septum. The bilateral inferior and middle turbinates were normal in appearance. The middle meati, olfactory clefts, and sphenoethmoidal recesses were without pus, polyps, or lesion bilaterally. The nasopharynx and bilateral eustachian tube orifices were without mass or lesion. The nasal mucosa was mildly edematous in appearance.  There is irritation to the left nasal vestibule.  The patient tolerated the procedure well.    Result Review :               Assessment and Plan    Diagnoses and all orders for this visit:    1. Dysosmia (Primary)  -     fluticasone (FLONASE) 50 MCG/ACT nasal spray; 2 sprays into the nostril(s) as directed by provider Daily.  Dispense: 16 g; Refill: 11    2. Dysgeusia    3. Nasal vestibulitis  -     mupirocin (BACTROBAN) 2 % nasal ointment; into the nostril(s) as directed by provider 2 (Two) Times a Day for 14 days. Apply to the nose twice daily  Dispense: 3 g; Refill: 1    We discussed the differential for her symptoms including medication side effect but discussed that this typically resolves after stopping medication.  She does have evidence of left-sided nasal vestibulitis and mild nasal mucosal edema.  She will be started on fluticasone and mupirocin to see if this may help with her symptoms.  Reassurance was offered.      Follow Up   Return if symptoms worsen or fail to improve.  Patient was given instructions and counseling regarding her condition or for health maintenance advice. Please see specific information pulled into the AVS if appropriate.

## 2021-07-01 NOTE — PROGRESS NOTES
Chief Complaint  Breast Problem (Breast/nipple tender for one week), Heartburn (Heartburn with meals. Patient trying to get pregnant.), and Leg Pain (Legs aching)    Subjective          Sweetie Hernandez presents to [facility]  Breast tenderness x 1 week- no masses palpated, no discharge, redness, or warmth.  LMP was 6/9/2021  Pt is trying to get pregnant  Pt has been having muscle pain in legs x 2-3 weeks      Heartburn  She complains of belching and heartburn. She reports no abdominal pain, no chest pain, no choking, no coughing, no dysphagia, no early satiety, no globus sensation, no hoarse voice, no nausea, no sore throat, no stridor or no wheezing. This is a recurrent problem. The current episode started 1 to 4 weeks ago. The problem occurs occasionally. The problem has been gradually worsening. The heartburn duration is more than one hour. The heartburn is located in the substernum. The heartburn is of moderate intensity. The heartburn does not wake her from sleep. The heartburn does not limit her activity. The heartburn doesn't change with position. Exacerbated by: certain foods. There are no known risk factors. She has tried an antacid and a PPI for the symptoms. The treatment provided no relief.   Leg Pain   There was no injury mechanism. The pain is present in the left leg and right leg. The quality of the pain is described as cramping and aching. The pain is moderate. The pain has been intermittent since onset. Pertinent negatives include no inability to bear weight, loss of motion, loss of sensation, muscle weakness, numbness or tingling. Associated symptoms comments: No swelling, or redness, or warmth.. She reports no foreign bodies present. The symptoms are aggravated by movement. She has tried nothing for the symptoms.       Objective   Allergies   Allergen Reactions   • Hydrocodone Anaphylaxis   • Naproxen Swelling     Lip swelling     Immunization History    Administered Date(s) Administered   • Influenza, Unspecified 10/27/2016, 10/26/2018   • Tdap 05/30/2017, 07/12/2018       Vital Signs:   Vitals:    07/01/21 1259   BP: 101/82   Pulse: 83   Temp: 98 °F (36.7 °C)   SpO2: 98%   Weight: 72.2 kg (159 lb 3.2 oz)       Physical Exam  Vitals reviewed.   Constitutional:       Appearance: Normal appearance. She is well-developed.   HENT:      Head: Normocephalic and atraumatic.      Right Ear: External ear normal.      Left Ear: External ear normal.      Mouth/Throat:      Pharynx: No oropharyngeal exudate.   Eyes:      Conjunctiva/sclera: Conjunctivae normal.      Pupils: Pupils are equal, round, and reactive to light.   Cardiovascular:      Rate and Rhythm: Normal rate and regular rhythm.      Pulses: Normal pulses.      Heart sounds: Normal heart sounds. No murmur heard.   No friction rub. No gallop.    Pulmonary:      Effort: Pulmonary effort is normal.      Breath sounds: Normal breath sounds. No wheezing or rhonchi.   Abdominal:      General: Bowel sounds are normal. There is no distension.      Palpations: Abdomen is soft.      Tenderness: There is no abdominal tenderness.   Skin:     General: Skin is warm and dry.   Neurological:      Mental Status: She is alert and oriented to person, place, and time.      Cranial Nerves: No cranial nerve deficit.   Psychiatric:         Mood and Affect: Mood and affect normal.         Behavior: Behavior normal.         Thought Content: Thought content normal.         Judgment: Judgment normal.        Result Review :   The following data was reviewed by: Robert Pickett MD on 07/01/2021:  CMP    CMP 6/21/21   Glucose 93   BUN 21 (A)   Creatinine 0.98   eGFR Non African Am 71   Sodium 140   Potassium 3.7   Chloride 105   Calcium 9.7   Albumin 4.70   Total Bilirubin 0.2   Alkaline Phosphatase 70   AST (SGOT) 21   ALT (SGPT) 26   (A) Abnormal value            CBC    CBC 10/5/20 3/30/21 6/21/21   WBC 6.52 9.33 12.01 (A)   RBC 4.56 4.56  4.75   Hemoglobin 13.9 13.9 14.3   Hematocrit 42.8 41.8 42.6   MCV 93.9 91.7 89.7   MCH 30.5 30.5 30.1   MCHC 32.5 33.3 33.6   RDW 11.9 (A) 11.9 (A) 11.5 (A)   Platelets 179 212 220   (A) Abnormal value            TSH    TSH 6/21/21   TSH 2.720                     Assessment and Plan    Diagnoses and all orders for this visit:    1. Amenorrhea (Primary)  -     Pregnancy, Urine - Urine, Clean Catch  -     hCG, Quantitative, Pregnancy; Future    2. Breast tenderness in female  -     Pregnancy, Urine - Urine, Clean Catch  -     hCG, Quantitative, Pregnancy; Future    3. Muscle pain  -     CBC Auto Differential  -     Comprehensive Metabolic Panel  -     TSH+Free T4  -     Magnesium  -     CK; Future    4. Gastroesophageal reflux disease without esophagitis  -     H. Pylori Antigen, Stool - Stool, Per Rectum  -     pantoprazole (Protonix) 40 MG EC tablet; Take 1 tablet by mouth Daily.  Dispense: 30 tablet; Refill: 1            Follow Up   Return in about 1 week (around 7/8/2021).  Patient was given instructions and counseling regarding her condition or for health maintenance advice. Please see specific information pulled into the AVS if appropriate.

## 2021-07-15 VITALS — OXYGEN SATURATION: 97 % | HEART RATE: 69 BPM | TEMPERATURE: 97.4 F

## 2021-07-15 VITALS
TEMPERATURE: 98 F | DIASTOLIC BLOOD PRESSURE: 80 MMHG | HEART RATE: 77 BPM | WEIGHT: 149 LBS | SYSTOLIC BLOOD PRESSURE: 130 MMHG | OXYGEN SATURATION: 97 % | BODY MASS INDEX: 26.4 KG/M2

## 2021-07-27 ENCOUNTER — TELEPHONE (OUTPATIENT)
Dept: ONCOLOGY | Facility: CLINIC | Age: 21
End: 2021-07-27

## 2021-07-27 NOTE — TELEPHONE ENCOUNTER
Caller: MORGAN     Relationship: ON BEHALF [facility] [employer]     Best call back number: [phone]    PER MORGAN, MEDICAL RECORDS WERE RE-FAXED TODAY AND SHE IS CALLING TO MAKE SURE THAT WE HAVE RECEIVED THOSE RECORDS.      Additional notes: PLEASE CONTACT PARAMEDS AND USE CASE NUMBER : [id number]    THANKS

## 2021-08-20 ENCOUNTER — OFFICE VISIT (OUTPATIENT)
Dept: FAMILY MEDICINE CLINIC | Facility: CLINIC | Age: 21
End: 2021-08-20

## 2021-08-20 VITALS
TEMPERATURE: 97.5 F | BODY MASS INDEX: 26.32 KG/M2 | DIASTOLIC BLOOD PRESSURE: 85 MMHG | SYSTOLIC BLOOD PRESSURE: 125 MMHG | OXYGEN SATURATION: 98 % | HEART RATE: 79 BPM | WEIGHT: 154.2 LBS | HEIGHT: 64 IN

## 2021-08-20 DIAGNOSIS — R68.2 DRY MOUTH: ICD-10-CM

## 2021-08-20 DIAGNOSIS — R63.0 DECREASED APPETITE: ICD-10-CM

## 2021-08-20 DIAGNOSIS — L84 FOOT CALLUS: ICD-10-CM

## 2021-08-20 DIAGNOSIS — Z11.59 NEED FOR HEPATITIS C SCREENING TEST: ICD-10-CM

## 2021-08-20 DIAGNOSIS — R11.0 NAUSEA: ICD-10-CM

## 2021-08-20 DIAGNOSIS — R06.83 SNORING: ICD-10-CM

## 2021-08-20 DIAGNOSIS — M77.51 TENDONITIS OF ANKLE, RIGHT: ICD-10-CM

## 2021-08-20 DIAGNOSIS — M77.52 TENDONITIS OF ANKLE, LEFT: ICD-10-CM

## 2021-08-20 DIAGNOSIS — R53.83 FATIGUE, UNSPECIFIED TYPE: Primary | ICD-10-CM

## 2021-08-20 LAB
BASOPHILS # BLD AUTO: 0.06 10*3/MM3 (ref 0–0.2)
BASOPHILS NFR BLD AUTO: 1 % (ref 0–1.5)
DEPRECATED RDW RBC AUTO: 40.2 FL (ref 37–54)
EOSINOPHIL # BLD AUTO: 0.24 10*3/MM3 (ref 0–0.4)
EOSINOPHIL NFR BLD AUTO: 3.9 % (ref 0.3–6.2)
ERYTHROCYTE [DISTWIDTH] IN BLOOD BY AUTOMATED COUNT: 11.9 % (ref 12.3–15.4)
FERRITIN SERPL-MCNC: 169 NG/ML (ref 13–150)
HCT VFR BLD AUTO: 43.9 % (ref 34–46.6)
HGB BLD-MCNC: 14.6 G/DL (ref 12–15.9)
IMM GRANULOCYTES # BLD AUTO: 0.01 10*3/MM3 (ref 0–0.05)
IMM GRANULOCYTES NFR BLD AUTO: 0.2 % (ref 0–0.5)
LYMPHOCYTES # BLD AUTO: 1.67 10*3/MM3 (ref 0.7–3.1)
LYMPHOCYTES NFR BLD AUTO: 27.2 % (ref 19.6–45.3)
MCH RBC QN AUTO: 30.6 PG (ref 26.6–33)
MCHC RBC AUTO-ENTMCNC: 33.3 G/DL (ref 31.5–35.7)
MCV RBC AUTO: 92 FL (ref 79–97)
MONOCYTES # BLD AUTO: 0.39 10*3/MM3 (ref 0.1–0.9)
MONOCYTES NFR BLD AUTO: 6.3 % (ref 5–12)
NEUTROPHILS NFR BLD AUTO: 3.78 10*3/MM3 (ref 1.7–7)
NEUTROPHILS NFR BLD AUTO: 61.4 % (ref 42.7–76)
NRBC BLD AUTO-RTO: 0 /100 WBC (ref 0–0.2)
PLATELET # BLD AUTO: 205 10*3/MM3 (ref 140–450)
PMV BLD AUTO: 11.1 FL (ref 6–12)
RBC # BLD AUTO: 4.77 10*6/MM3 (ref 3.77–5.28)
UREA BREATH TEST QL: NEGATIVE
WBC # BLD AUTO: 6.15 10*3/MM3 (ref 3.4–10.8)

## 2021-08-20 PROCEDURE — 84425 ASSAY OF VITAMIN B-1: CPT | Performed by: NURSE PRACTITIONER

## 2021-08-20 PROCEDURE — 86038 ANTINUCLEAR ANTIBODIES: CPT | Performed by: NURSE PRACTITIONER

## 2021-08-20 PROCEDURE — 86225 DNA ANTIBODY NATIVE: CPT | Performed by: NURSE PRACTITIONER

## 2021-08-20 PROCEDURE — 85025 COMPLETE CBC W/AUTO DIFF WBC: CPT | Performed by: NURSE PRACTITIONER

## 2021-08-20 PROCEDURE — 99214 OFFICE O/P EST MOD 30 MIN: CPT | Performed by: NURSE PRACTITIONER

## 2021-08-20 PROCEDURE — 84466 ASSAY OF TRANSFERRIN: CPT | Performed by: NURSE PRACTITIONER

## 2021-08-20 PROCEDURE — 82728 ASSAY OF FERRITIN: CPT | Performed by: NURSE PRACTITIONER

## 2021-08-20 PROCEDURE — 86803 HEPATITIS C AB TEST: CPT | Performed by: NURSE PRACTITIONER

## 2021-08-20 PROCEDURE — 82746 ASSAY OF FOLIC ACID SERUM: CPT | Performed by: NURSE PRACTITIONER

## 2021-08-20 PROCEDURE — 83013 H PYLORI (C-13) BREATH: CPT | Performed by: NURSE PRACTITIONER

## 2021-08-20 PROCEDURE — 82306 VITAMIN D 25 HYDROXY: CPT | Performed by: NURSE PRACTITIONER

## 2021-08-20 PROCEDURE — 80053 COMPREHEN METABOLIC PANEL: CPT | Performed by: NURSE PRACTITIONER

## 2021-08-20 PROCEDURE — 83540 ASSAY OF IRON: CPT | Performed by: NURSE PRACTITIONER

## 2021-08-20 PROCEDURE — 82607 VITAMIN B-12: CPT | Performed by: NURSE PRACTITIONER

## 2021-08-20 NOTE — PROGRESS NOTES
Chief Complaint  Fatigue (Fatigue and dry mouth for several months.), Dry Mouth, and Callouses (Callus on left toe x 2 months.)    Subjective            Sweetie Hernandez presents to [facility]  History of Present Illness     She c/o fatigue and dry mouth - the fatigue has been for a few weeks now - she is going to bed around 9-10 and she wakes between 8 or 11AM. She is getting 11 or more hours of sleep per night. She does snore per her boyfriend (not present). She does not stop breathing that she knows of.     She denies pregnancy. She is sexually active and LMP was 8/7.     She goes to hematology in [place] - last saw 6 months ago - she is due to go back in September.     She denies any change in her nails. Her hair falls out, and she does have two calluses on her foot that have been there for two months.     She needs a new referral to podiatry - was seeing Dr. Birmingham on [address] - she got Passport and they couldn't see her without one. Hx of bilateral ankle tendonitis and was getting steroid shots for treatment.     Her appetite is decreased - she eats twice per day. She does drink caffeine. She drinks a monster every AM. She drinks a soda or another coffee drink during the day; like an iced coffee from [employer]. She feels like she eats healthy - her diet is varied. She usually has a meat, a vegetable, and a carb. She denies any constipation. She occasionally has post-prandial diarrhea. No rectal bleeding or blood in the stool. No vomiting. No heartburn or acid reflux.       Past Medical History:   Diagnosis Date   • Anxiety    • Bipolar disorder (CMS/[facility])    • Depression     meds prior to pregnancy   • Factor V Leiden (CMS/[facility])    • Preeclampsia    • Seasonal allergies    • Trichimoniasis    • Urinary tract infection    • Urogenital trichomoniasis        Allergies   Allergen Reactions   • Hydrocodone Anaphylaxis   • Naproxen Swelling     Lip swelling        Past Surgical  History:   Procedure Laterality Date   • ADENOIDECTOMY     •  SECTION N/A 2018    Procedure:  SECTION PRIMARY;  Surgeon: Radha Noel MD;  Location: [facility] LABOR DELIVERY;  Service: Obstetrics/Gynecology   • COLONOSCOPY  approx     normal per pt    • EYE SURGERY     • TONSILLECTOMY     • UPPER GASTROINTESTINAL ENDOSCOPY  approx     reflux and scaring per pt         Social History     Tobacco Use   • Smoking status: Former Smoker     Packs/day: 1.00     Years: 0.00     Pack years: 0.00     Types: Cigarettes     Quit date: 2021     Years since quittin.1   • Smokeless tobacco: Never Used   • Tobacco comment: quit smoking 2021   Vaping Use   • Vaping Use: Some days   Substance Use Topics   • Alcohol use: Yes     Alcohol/week: 3.0 standard drinks     Types: 3 Glasses of wine per week   • Drug use: No       Family History   Problem Relation Age of Onset   • Diabetes Mother    • Hypertension Mother    • Heart disease Mother    • Thyroid disease Sister    • Heart disease Maternal Uncle    • Heart disease Maternal Grandmother    • Heart disease Maternal Grandfather    • Hypertension Maternal Grandfather    • Cancer Maternal Grandfather    • Colon cancer Cousin         Health Maintenance Due   Topic Date Due   • ANNUAL PHYSICAL  Never done   • HPV VACCINES (1 - 2-dose series) Never done   • HEPATITIS C SCREENING  Never done   • PAP SMEAR  Never done        Current Outpatient Medications on File Prior to Visit   Medication Sig   • fluticasone (FLONASE) 50 MCG/ACT nasal spray 2 sprays into the nostril(s) as directed by provider Daily.   • OLANZapine zydis (zyPREXA) 5 MG disintegrating tablet TAKE 1/2 TO 1 TABLET BY MOUTH EVERY NIGHT AT BEDTIME AS NEEDED   • OXcarbazepine (Trileptal) 300 MG tablet Take 300 mg by mouth 2 (Two) Times a Day.   • prenatal vitamin (prenatal, CLASSIC, vitamin) tablet Take  by mouth Daily.   • [DISCONTINUED] pantoprazole (Protonix) 40 MG EC tablet Take  "1 tablet by mouth Daily.     No current facility-administered medications on file prior to visit.       Immunization History   Administered Date(s) Administered   • Influenza, Unspecified 10/27/2016, 10/26/2018   • Tdap 05/30/2017, 07/12/2018       Review of Systems     Objective     /85   Pulse 79   Temp 97.5 °F (36.4 °C)   Ht 162.6 cm (64\")   Wt 69.9 kg (154 lb 3.2 oz)   SpO2 98%   BMI 26.47 kg/m²       Physical Exam  Vitals reviewed.   Constitutional:       General: She is not in acute distress.     Appearance: Normal appearance. She is well-developed.   HENT:      Head: Normocephalic and atraumatic.   Eyes:      General: No scleral icterus.     Conjunctiva/sclera: Conjunctivae normal.      Pupils: Pupils are equal, round, and reactive to light.   Neck:      Thyroid: No thyroid mass, thyromegaly or thyroid tenderness.      Trachea: Trachea normal.   Cardiovascular:      Rate and Rhythm: Normal rate and regular rhythm.      Pulses: Normal pulses.      Heart sounds: No murmur heard.     Pulmonary:      Effort: Pulmonary effort is normal.      Breath sounds: Normal breath sounds. No wheezing or rhonchi.   Abdominal:      General: Bowel sounds are normal. There is no distension.      Palpations: Abdomen is soft. There is no mass.      Tenderness: There is no abdominal tenderness.   Musculoskeletal:         General: Normal range of motion.      Cervical back: Normal range of motion and neck supple.      Right lower leg: No edema.      Left lower leg: No edema.   Lymphadenopathy:      Cervical: No cervical adenopathy.   Skin:     General: Skin is warm and dry.      Comments: On the 5th and 4th toes, lateral aspects, left foot, there are two circumscribed lesions present with a pinpoint center. On the 5th toe, right foot, lateral aspect there is a similar circumscribed lesion, but without pinpoint center.   Neurological:      Mental Status: She is alert and oriented to person, place, and time.   Psychiatric: "         Mood and Affect: Mood and affect normal.         Behavior: Behavior normal.         Thought Content: Thought content normal.         Judgment: Judgment normal.         Result Review :     The following data was reviewed by: LINDA Barragan on 08/20/2021:    Common labs    Common Labsle 3/30/21 6/21/21 6/21/21 7/1/21 7/1/21     2350 2350 1335 1335   Glucose   93  62 (A)   BUN   21 (A)  14   Creatinine   0.98  0.72   eGFR Non African Am   71  101   Sodium   140  140   Potassium   3.7  3.7   Chloride   105  104   Calcium   9.7  9.3   Albumin   4.70  4.20   Total Bilirubin   0.2  0.2   Alkaline Phosphatase   70  61   AST (SGOT)   21  22   ALT (SGPT)   26  38 (A)   WBC 9.33 12.01 (A)  7.38    Hemoglobin 13.9 14.3  14.0    Hematocrit 41.8 42.6  41.0    Platelets 212 220  226    (A) Abnormal value            Data reviewed: Radiologic studies [facility] 2019 - no acute findings; no gallbladder abnormality              Assessment and Plan      Diagnoses and all orders for this visit:    1. Fatigue, unspecified type (Primary)  -     CBC Auto Differential  -     Comprehensive Metabolic Panel  -     Iron Profile  -     Ferritin  -     Vitamin B12 & Folate  -     Vitamin D 25 Hydroxy  -     ONEYDA  -     Zinc  -     Vitamin B1, Whole Blood  -     Ambulatory Referral to Sleep Medicine    2. Need for hepatitis C screening test  -     Hepatitis C Antibody    3. Decreased appetite  -     H. Pylori Breath Test - Breath, Lung    4. Dry mouth  -     ONEYDA  -     Zinc  -     Vitamin B1, Whole Blood    5. Nausea  -     H. Pylori Breath Test - Breath, Lung    6. Tendonitis of ankle, right  -     Ambulatory Referral to Podiatry    7. Tendonitis of ankle, left  -     Ambulatory Referral to Podiatry    8. Foot callus  Comments:  callus vs. wart vs. corn  Orders:  -     Ambulatory Referral to Podiatry    9. Snoring  -     Ambulatory Referral to Sleep Medicine            Follow Up     Return for Recheck pending outcome of labs.      Referral back to podiatry for her ankle pain/tendonitis and to assess concerns for callus vs. Wart vs. Other etiology.     Referral to sleep medicine. She is sleeping for prolonged periods of time, but admits to snoring and daytime fatigue.     Consider sooner follow up with psych if labs/sleep study normal d/t hx of depression and mood disorder.     Patient was given instructions and counseling regarding her condition or for health maintenance advice. Please see specific information pulled into the AVS if appropriate.     Sweetie DINORA Hernandez  reports that she quit smoking about 2 months ago. Her smoking use included cigarettes. She smoked 1.00 pack per day for 0.00 years. She has never used smokeless tobacco.

## 2021-08-21 LAB
25(OH)D3 SERPL-MCNC: 32.9 NG/ML (ref 30–100)
ALBUMIN SERPL-MCNC: 4.7 G/DL (ref 3.5–5.2)
ALBUMIN/GLOB SERPL: 1.7 G/DL
ALP SERPL-CCNC: 63 U/L (ref 39–117)
ALT SERPL W P-5'-P-CCNC: 21 U/L (ref 1–33)
ANION GAP SERPL CALCULATED.3IONS-SCNC: 10.3 MMOL/L (ref 5–15)
AST SERPL-CCNC: 17 U/L (ref 1–32)
BILIRUB SERPL-MCNC: 0.6 MG/DL (ref 0–1.2)
BUN SERPL-MCNC: 11 MG/DL (ref 6–20)
BUN/CREAT SERPL: 14.7 (ref 7–25)
CALCIUM SPEC-SCNC: 10.1 MG/DL (ref 8.6–10.5)
CHLORIDE SERPL-SCNC: 106 MMOL/L (ref 98–107)
CO2 SERPL-SCNC: 24.7 MMOL/L (ref 22–29)
CREAT SERPL-MCNC: 0.75 MG/DL (ref 0.57–1)
DSDNA IGG SERPL IA-ACNC: NEGATIVE [IU]/ML
FOLATE SERPL-MCNC: 15.9 NG/ML (ref 4.78–24.2)
GFR SERPL CREATININE-BSD FRML MDRD: 96 ML/MIN/1.73
GLOBULIN UR ELPH-MCNC: 2.7 GM/DL
GLUCOSE SERPL-MCNC: 80 MG/DL (ref 65–99)
HCV AB SER DONR QL: NORMAL
IRON 24H UR-MRATE: 136 MCG/DL (ref 37–145)
IRON SATN MFR SERPL: 36 % (ref 20–50)
NUCLEAR IGG SER IA-RTO: NEGATIVE
POTASSIUM SERPL-SCNC: 4.2 MMOL/L (ref 3.5–5.2)
PROT SERPL-MCNC: 7.4 G/DL (ref 6–8.5)
SODIUM SERPL-SCNC: 141 MMOL/L (ref 136–145)
TIBC SERPL-MCNC: 383 MCG/DL (ref 298–536)
TRANSFERRIN SERPL-MCNC: 257 MG/DL (ref 200–360)
VIT B12 BLD-MCNC: 772 PG/ML (ref 211–946)

## 2021-08-24 LAB — VIT B1 BLD-SCNC: 155.9 NMOL/L (ref 66.5–200)

## 2021-08-26 PROCEDURE — 84630 ASSAY OF ZINC: CPT | Performed by: NURSE PRACTITIONER

## 2021-08-27 ENCOUNTER — TELEMEDICINE (OUTPATIENT)
Dept: FAMILY MEDICINE CLINIC | Facility: CLINIC | Age: 21
End: 2021-08-27

## 2021-08-27 DIAGNOSIS — R53.83 FATIGUE, UNSPECIFIED TYPE: Primary | ICD-10-CM

## 2021-08-27 PROCEDURE — 99213 OFFICE O/P EST LOW 20 MIN: CPT | Performed by: NURSE PRACTITIONER

## 2021-08-27 NOTE — PROGRESS NOTES
Chief Complaint  Follow-up (following up on most recent lab work )    Subjective            Sweetie Hernandez presents to [facility]  History of Present Illness     This was an audio and video enabled telemedicine encounter.    She presents today to discuss recent labs -     Still fatigued but has an appointment with sleep medicine on 10/13    She has an appointment with hematology in 2022      Past Medical History:   Diagnosis Date   • Anxiety    • Bipolar disorder (CMS/[facility])    • Depression     meds prior to pregnancy   • Factor V Leiden (CMS/[facility])    • Preeclampsia    • Seasonal allergies    • Trichimoniasis    • Urinary tract infection    • Urogenital trichomoniasis        Allergies   Allergen Reactions   • Hydrocodone Anaphylaxis   • Naproxen Swelling     Lip swelling        Past Surgical History:   Procedure Laterality Date   • ADENOIDECTOMY     •  SECTION N/A 2018    Procedure:  SECTION PRIMARY;  Surgeon: Radha Noel MD;  Location: [facility] DELIVERY;  Service: Obstetrics/Gynecology   • COLONOSCOPY  approx     normal per pt    • EYE SURGERY     • TONSILLECTOMY     • UPPER GASTROINTESTINAL ENDOSCOPY  approx     reflux and scaring per pt         Social History     Tobacco Use   • Smoking status: Former Smoker     Packs/day: 1.00     Years: 0.00     Pack years: 0.00     Types: Cigarettes     Quit date: 2021     Years since quittin.2   • Smokeless tobacco: Never Used   • Tobacco comment: quit smoking 2021   Vaping Use   • Vaping Use: Some days   Substance Use Topics   • Alcohol use: Yes     Alcohol/week: 3.0 standard drinks     Types: 3 Glasses of wine per week   • Drug use: No       Family History   Problem Relation Age of Onset   • Diabetes Mother    • Hypertension Mother    • Heart disease Mother    • Thyroid disease Sister    • Heart disease Maternal Uncle    • Heart disease Maternal Grandmother    • Heart disease  Maternal Grandfather    • Hypertension Maternal Grandfather    • Cancer Maternal Grandfather    • Colon cancer Cousin         Health Maintenance Due   Topic Date Due   • ANNUAL PHYSICAL  Never done   • HPV VACCINES (1 - 2-dose series) Never done   • PAP SMEAR  Never done        Current Outpatient Medications on File Prior to Visit   Medication Sig   • fluticasone (FLONASE) 50 MCG/ACT nasal spray 2 sprays into the nostril(s) as directed by provider Daily.   • OLANZapine zydis (zyPREXA) 5 MG disintegrating tablet TAKE 1/2 TO 1 TABLET BY MOUTH EVERY NIGHT AT BEDTIME AS NEEDED   • OXcarbazepine (Trileptal) 300 MG tablet Take 300 mg by mouth 2 (Two) Times a Day.   • prenatal vitamin (prenatal, CLASSIC, vitamin) tablet Take  by mouth Daily.     No current facility-administered medications on file prior to visit.       Immunization History   Administered Date(s) Administered   • COVID-19 (MODERNA) 07/28/2021, 08/09/2021   • Flu Vaccine Split Quad 10/27/2016   • Influenza, Unspecified 10/27/2016, 10/26/2018   • Tdap 08/01/2009, 05/30/2017, 07/12/2018       Review of Systems     Objective     There were no vitals taken for this visit.      Physical Exam  Constitutional:       General: She is not in acute distress.     Appearance: Normal appearance. She is not ill-appearing.   HENT:      Head: Normocephalic and atraumatic.   Eyes:      General: No scleral icterus.  Pulmonary:      Effort: Pulmonary effort is normal. No respiratory distress.   Skin:     Coloration: Skin is not pale.   Neurological:      Mental Status: She is alert and oriented to person, place, and time.   Psychiatric:         Mood and Affect: Mood normal.         Behavior: Behavior normal.         Thought Content: Thought content normal.         Judgment: Judgment normal.         Result Review :     The following data was reviewed by: LINDA Barragan on 08/27/2021:    H. Pylori Breath Test - Breath, Lung (08/20/2021 10:41)  Vitamin B1, Whole Blood  (08/20/2021 10:41)  ONEYDA (08/20/2021 10:41)  Vitamin D 25 Hydroxy (08/20/2021 10:41)  Vitamin B12 & Folate (08/20/2021 10:41)  Ferritin (08/20/2021 10:41)  Iron Profile (08/20/2021 10:41)  Comprehensive Metabolic Panel (08/20/2021 10:41)  CBC Auto Differential (08/20/2021 10:41)  Hepatitis C Antibody (08/20/2021 10:41)    Zinc still pending     Assessment and Plan      Diagnoses and all orders for this visit:    1. Fatigue, unspecified type (Primary)            Follow Up     No follow-ups on file.     Labs do not reveal any significant abnormal findings that would suggest an underlying cause for her fatigue. Advised to follow through with sleep medicine referral. Follow up with psychiatry and hematology. If a logical explanation is not identified through those consults, then consider rheumatology to eval for chronic fatigue syndrome.     Patient was given instructions and counseling regarding her condition or for health maintenance advice. Please see specific information pulled into the AVS if appropriate.     Sweetie DINORA Hernandez  reports that she quit smoking about 2 months ago. Her smoking use included cigarettes. She smoked 1.00 pack per day for 0.00 years. She has never used smokeless tobacco.

## 2021-09-01 LAB — ZINC SERPL-MCNC: 95 UG/DL (ref 44–115)

## 2021-09-02 ENCOUNTER — TELEPHONE (OUTPATIENT)
Dept: ONCOLOGY | Facility: CLINIC | Age: 21
End: 2021-09-02

## 2021-09-02 NOTE — TELEPHONE ENCOUNTER
Caller: Sweetie Hernandez    Relationship to patient: Self    Best call back number: [phone]    Chief complaint: NEEDING TO R/S LAB AND RN REVIEW     Type of visit: LAB/RN REVIEW     Requested date: ANYTIME IN LATE NOVEMBER    If rescheduling, when is the original appointment: 09/14/21    Additional notes:    PLEASE CALL PT BACK TO ADVISE NEW APPT DATE AND TIME.

## 2021-10-01 ENCOUNTER — OFFICE VISIT (OUTPATIENT)
Dept: FAMILY MEDICINE CLINIC | Facility: CLINIC | Age: 21
End: 2021-10-01

## 2021-10-01 VITALS — TEMPERATURE: 98.5 F | HEART RATE: 73 BPM | OXYGEN SATURATION: 96 %

## 2021-10-01 DIAGNOSIS — J02.9 PHARYNGITIS, UNSPECIFIED ETIOLOGY: Primary | ICD-10-CM

## 2021-10-01 PROBLEM — T45.4X5S ADVERSE EFFECT OF IRON AND ITS COMPOUNDS, SEQUELA: Status: ACTIVE | Noted: 2019-08-22

## 2021-10-01 LAB
EXPIRATION DATE: NORMAL
INTERNAL CONTROL: NORMAL
Lab: NORMAL
SARS-COV-2 AG UPPER RESP QL IA.RAPID: NOT DETECTED

## 2021-10-01 PROCEDURE — 87635 SARS-COV-2 COVID-19 AMP PRB: CPT | Performed by: FAMILY MEDICINE

## 2021-10-01 PROCEDURE — 87426 SARSCOV CORONAVIRUS AG IA: CPT | Performed by: FAMILY MEDICINE

## 2021-10-01 PROCEDURE — 99213 OFFICE O/P EST LOW 20 MIN: CPT | Performed by: FAMILY MEDICINE

## 2021-10-01 RX ORDER — PREDNISONE 20 MG/1
40 TABLET ORAL DAILY
Qty: 10 TABLET | Refills: 0 | Status: SHIPPED | OUTPATIENT
Start: 2021-10-01 | End: 2021-10-06

## 2021-10-01 RX ORDER — ONDANSETRON 4 MG/1
4 TABLET, FILM COATED ORAL 4 TIMES DAILY PRN
Qty: 28 TABLET | Refills: 0 | Status: SHIPPED | OUTPATIENT
Start: 2021-10-01 | End: 2021-11-12

## 2021-10-01 RX ORDER — AZITHROMYCIN 250 MG/1
TABLET, FILM COATED ORAL
Qty: 6 TABLET | Refills: 0 | Status: SHIPPED | OUTPATIENT
Start: 2021-10-01 | End: 2021-11-12

## 2021-10-01 NOTE — PROGRESS NOTES
Chief Complaint  Nausea (for one wk, has had drainage for 2 days )    Subjective          Sweetie Hernandez presents to [facility]  Pt is vaccinated for covid.    URI   This is a new problem. Episode onset: 5 days. The problem has been gradually worsening. There has been no fever. Associated symptoms include congestion, coughing, nausea, sinus pain and a sore throat. Pertinent negatives include no abdominal pain, chest pain, diarrhea, dysuria, ear pain, headaches, joint pain, joint swelling, neck pain, plugged ear sensation, rash, rhinorrhea, sneezing, swollen glands, vomiting or wheezing. She has tried nothing for the symptoms.       Objective   Allergies   Allergen Reactions   • Hydrocodone Anaphylaxis   • Naproxen Swelling     Lip swelling     Immunization History   Administered Date(s) Administered   • COVID-19 (MODERNA) 07/28/2021, 08/09/2021   • Flu Vaccine Split Quad 10/27/2016   • Influenza, Unspecified 10/27/2016, 10/26/2018   • Tdap 08/01/2009, 05/30/2017, 07/12/2018       Vital Signs:   Vitals:    10/01/21 1530   Pulse: 73   Temp: 98.5 °F (36.9 °C)   SpO2: 96%       Physical Exam  Vitals reviewed.   Constitutional:       Appearance: Normal appearance. She is well-developed.   HENT:      Head: Normocephalic and atraumatic.      Right Ear: Tympanic membrane, ear canal and external ear normal.      Left Ear: Tympanic membrane, ear canal and external ear normal.      Nose: Nose normal.      Mouth/Throat:      Mouth: Mucous membranes are moist.      Pharynx: Oropharynx is clear. Posterior oropharyngeal erythema present. No oropharyngeal exudate.   Eyes:      Conjunctiva/sclera: Conjunctivae normal.      Pupils: Pupils are equal, round, and reactive to light.   Cardiovascular:      Rate and Rhythm: Normal rate and regular rhythm.      Pulses: Normal pulses.      Heart sounds: Normal heart sounds. No murmur heard.   No friction rub. No gallop.    Pulmonary:      Effort: Pulmonary  effort is normal.      Breath sounds: Normal breath sounds. No wheezing or rhonchi.   Abdominal:      General: Abdomen is flat. Bowel sounds are normal. There is no distension.      Palpations: Abdomen is soft. There is no mass.      Tenderness: There is no abdominal tenderness. There is no guarding or rebound.      Hernia: No hernia is present.   Musculoskeletal:         General: Normal range of motion.      Cervical back: Normal range of motion and neck supple.   Skin:     General: Skin is warm and dry.      Capillary Refill: Capillary refill takes less than 2 seconds.   Neurological:      Mental Status: She is alert and oriented to person, place, and time.      Cranial Nerves: No cranial nerve deficit.   Psychiatric:         Mood and Affect: Mood and affect normal.         Behavior: Behavior normal.         Thought Content: Thought content normal.         Judgment: Judgment normal.        Result Review :                 Assessment and Plan    Diagnoses and all orders for this visit:    1. Pharyngitis, unspecified etiology (Primary)  -     POCT SARS-CoV-2 Antigen [place]  -     COVID-19,CEPHEID/ROMEL/BDMAX,COR/ALEX/PAD/JANNA IN-HOUSE(OR EMERGENT/ADD-ON),NP SWAB IN TRANSPORT MEDIA 3-4 HR TAT, RT-PCR - Swab, Nasopharynx  -     azithromycin (Zithromax Z-Reid) 250 MG tablet; Take 2 tablets by mouth on day 1, then 1 tablet daily on days 2-5  Dispense: 6 tablet; Refill: 0  -     predniSONE (DELTASONE) 20 MG tablet; Take 2 tablets by mouth Daily for 5 days.  Dispense: 10 tablet; Refill: 0  -     ondansetron (Zofran) 4 MG tablet; Take 1 tablet by mouth 4 (Four) Times a Day As Needed for Nausea or Vomiting.  Dispense: 28 tablet; Refill: 0            Follow Up   Return in about 1 week (around 10/8/2021), or if symptoms worsen or fail to improve.  Patient was given instructions and counseling regarding her condition or for health maintenance advice. Please see specific information pulled into the AVS if appropriate.

## 2021-10-02 LAB — SARS-COV-2 N GENE RESP QL NAA+PROBE: NOT DETECTED

## 2021-10-07 ENCOUNTER — TELEPHONE (OUTPATIENT)
Dept: FAMILY MEDICINE CLINIC | Facility: CLINIC | Age: 21
End: 2021-10-07

## 2021-10-07 NOTE — TELEPHONE ENCOUNTER
Have pt take 2 tablets of zofran at once.  If not better, she may need IVF's from the ER.  Thanks.

## 2021-11-12 ENCOUNTER — OFFICE VISIT (OUTPATIENT)
Dept: FAMILY MEDICINE CLINIC | Facility: CLINIC | Age: 21
End: 2021-11-12

## 2021-11-12 VITALS
WEIGHT: 169 LBS | TEMPERATURE: 97.6 F | SYSTOLIC BLOOD PRESSURE: 114 MMHG | OXYGEN SATURATION: 98 % | BODY MASS INDEX: 28.85 KG/M2 | HEART RATE: 83 BPM | HEIGHT: 64 IN | DIASTOLIC BLOOD PRESSURE: 68 MMHG

## 2021-11-12 DIAGNOSIS — M54.50 CHRONIC BILATERAL LOW BACK PAIN WITHOUT SCIATICA: Primary | ICD-10-CM

## 2021-11-12 DIAGNOSIS — G89.29 CHRONIC BILATERAL LOW BACK PAIN WITHOUT SCIATICA: Primary | ICD-10-CM

## 2021-11-12 PROCEDURE — 99214 OFFICE O/P EST MOD 30 MIN: CPT | Performed by: NURSE PRACTITIONER

## 2021-11-12 RX ORDER — DOXEPIN HYDROCHLORIDE 25 MG/1
1 CAPSULE ORAL DAILY
COMMUNITY
Start: 2021-10-08 | End: 2022-01-17

## 2021-11-12 RX ORDER — TIZANIDINE 4 MG/1
4 TABLET ORAL EVERY 8 HOURS PRN
Qty: 30 TABLET | Refills: 0 | Status: SHIPPED | OUTPATIENT
Start: 2021-11-12 | End: 2022-01-17

## 2021-11-12 NOTE — PROGRESS NOTES
Chief Complaint  Hip Pain (bilateral )    Subjective            Sweetie Hernandez presents to [facility]  History of Present Illness     Patient c/o bilateral hip and buttock pain for the past month. No known injury. She has not had any falls. Not been in any MVA. No heavy lifting that she is aware. Denies any groin pain. She states that the pain is worse after prolonged periods of sitting. When she goes to stand up it is hard for her to stand d/t the pain. The pain does not radiate down the legs. No loss of bowel or bladder.     She has not been evaluated for this pain before. No prior x-rays. She has tried taking Midol or Tylenol PRN but not very often/consistently - she states that it doesn't help much. She notices the pain more with rain.       Past Medical History:   Diagnosis Date   • Anxiety    • Bipolar disorder ([facility])    • Depression     meds prior to pregnancy   • Factor V Leiden ([facility])    • Preeclampsia    • Seasonal allergies    • Trichimoniasis    • Urinary tract infection    • Urogenital trichomoniasis        Allergies   Allergen Reactions   • Hydrocodone Anaphylaxis   • Naproxen Swelling     Lip swelling        Past Surgical History:   Procedure Laterality Date   • ADENOIDECTOMY     •  SECTION N/A 2018    Procedure:  SECTION PRIMARY;  Surgeon: Radha Noel MD;  Location: [facility] LABOR DELIVERY;  Service: Obstetrics/Gynecology   • COLONOSCOPY  approx     normal per pt    • EYE SURGERY     • TONSILLECTOMY     • UPPER GASTROINTESTINAL ENDOSCOPY  approx     reflux and scaring per pt         Social History     Tobacco Use   • Smoking status: Former Smoker     Packs/day: 1.00     Years: 0.00     Pack years: 0.00     Types: Cigarettes     Quit date: 2021     Years since quittin.4   • Smokeless tobacco: Never Used   • Tobacco comment: quit smoking 2021   Vaping Use   • Vaping Use: Some days   Substance Use Topics   • Alcohol use:  "Yes     Alcohol/week: 3.0 standard drinks     Types: 3 Glasses of wine per week   • Drug use: No       Family History   Problem Relation Age of Onset   • Diabetes Mother    • Hypertension Mother    • Heart disease Mother    • Thyroid disease Sister    • Heart disease Maternal Uncle    • Heart disease Maternal Grandmother    • Heart disease Maternal Grandfather    • Hypertension Maternal Grandfather    • Cancer Maternal Grandfather    • Colon cancer Cousin         Health Maintenance Due   Topic Date Due   • ANNUAL PHYSICAL  Never done   • HPV VACCINES (1 - 2-dose series) Never done   • PAP SMEAR  Never done   • INFLUENZA VACCINE  08/01/2021        Current Outpatient Medications on File Prior to Visit   Medication Sig   • doxepin (SINEquan) 25 MG capsule Take 1 capsule by mouth Daily.   • fluticasone (FLONASE) 50 MCG/ACT nasal spray 2 sprays into the nostril(s) as directed by provider Daily.   • OLANZapine zydis (zyPREXA) 5 MG disintegrating tablet TAKE 1/2 TO 1 TABLET BY MOUTH EVERY NIGHT AT BEDTIME AS NEEDED   • OXcarbazepine (Trileptal) 300 MG tablet Take 300 mg by mouth 2 (Two) Times a Day.   • [DISCONTINUED] azithromycin (Zithromax Z-Reid) 250 MG tablet Take 2 tablets by mouth on day 1, then 1 tablet daily on days 2-5   • [DISCONTINUED] ondansetron (Zofran) 4 MG tablet Take 1 tablet by mouth 4 (Four) Times a Day As Needed for Nausea or Vomiting.   • [DISCONTINUED] prenatal vitamin (prenatal, CLASSIC, vitamin) tablet Take  by mouth Daily.     No current facility-administered medications on file prior to visit.       Immunization History   Administered Date(s) Administered   • COVID-19 (MODERNA) 07/28/2021, 08/09/2021, 08/26/2021   • Flu Vaccine Split Quad 10/27/2016   • Influenza, Unspecified 10/27/2016, 10/26/2018   • Tdap 08/01/2009, 05/30/2017, 07/12/2018       Review of Systems     Objective     /68   Pulse 83   Temp 97.6 °F (36.4 °C)   Ht 162.6 cm (64\")   Wt 76.7 kg (169 lb)   SpO2 98%   BMI 29.01 " kg/m²       Physical Exam  Vitals reviewed.   Constitutional:       General: She is not in acute distress.     Appearance: Normal appearance. She is well-developed and overweight.   HENT:      Head: Normocephalic and atraumatic.   Eyes:      General: No scleral icterus.     Conjunctiva/sclera: Conjunctivae normal.   Cardiovascular:      Rate and Rhythm: Normal rate and regular rhythm.      Pulses: Normal pulses.      Heart sounds: No murmur heard.      Pulmonary:      Effort: Pulmonary effort is normal.      Breath sounds: Normal breath sounds. No wheezing or rhonchi.   Abdominal:      General: Bowel sounds are normal. There is no distension.      Palpations: Abdomen is soft. There is no mass.      Tenderness: There is no abdominal tenderness.   Musculoskeletal:         General: Normal range of motion.      Thoracic back: No swelling, deformity, signs of trauma, tenderness or bony tenderness. Normal range of motion. No scoliosis.      Lumbar back: Tenderness (bilateral paraspinal tenderness, but no midline tenderness noted) present. No swelling, deformity, signs of trauma or bony tenderness. Normal range of motion. Negative right straight leg raise test and negative left straight leg raise test. No scoliosis.      Right lower leg: No edema.      Left lower leg: No edema.   Skin:     General: Skin is warm and dry.   Neurological:      Mental Status: She is alert and oriented to person, place, and time.   Psychiatric:         Mood and Affect: Mood and affect normal.         Behavior: Behavior normal.         Thought Content: Thought content normal.         Judgment: Judgment normal.         Result Review :     The following data was reviewed by: LINDA Barragan on 11/12/2021:    CMP    CMP 6/21/21 7/1/21 8/20/21   Glucose 93 62 (A) 80   BUN 21 (A) 14 11   Creatinine 0.98 0.72 0.75   eGFR Non African Am 71 101 96   Sodium 140 140 141   Potassium 3.7 3.7 4.2   Chloride 105 104 106   Calcium 9.7 9.3 10.1    Albumin 4.70 4.20 4.70   Total Bilirubin 0.2 0.2 0.6   Alkaline Phosphatase 70 61 63   AST (SGOT) 21 22 17   ALT (SGPT) 26 38 (A) 21   (A) Abnormal value            Data reviewed: Radiologic studies :   XR Spine Lumbar 2 or 3 View (In Office) (11/12/2021 10:09)           Assessment and Plan      Diagnoses and all orders for this visit:    1. Chronic bilateral low back pain without sciatica (Primary)  -     XR Spine Lumbar 2 or 3 View (In Office)  -     Ambulatory Referral to Physical Therapy Evaluate and treat  -     tiZANidine (ZANAFLEX) 4 MG tablet; Take 1 tablet by mouth Every 8 (Eight) Hours As Needed for Muscle Spasms.  Dispense: 30 tablet; Refill: 0            Follow Up     Return if symptoms worsen or fail to improve.     Advise patient that lumbar spine x-ray showed no acute findings.  Suspect musculoskeletal pain.  I have recommended physical therapy to help strengthen her core and lower back.  I will also give her a muscle relaxer to use as needed.  She will use Tylenol as needed for pain.  We discussed a prescription for ibuprofen; however, in the past she has tried to overdose on ibuprofen, so she would prefer not to have a prescription for this.    She has been advised to follow-up with me with any acutely worsening symptoms, or if symptoms are persistent after completion of physical therapy.    Patient was given instructions and counseling regarding her condition or for health maintenance advice. Please see specific information pulled into the AVS if appropriate.     Sweetie Hernandez  reports that she quit smoking about 5 months ago. Her smoking use included cigarettes. She smoked 1.00 pack per day for 0.00 years. She has never used smokeless tobacco.

## 2021-11-15 ENCOUNTER — OFFICE VISIT (OUTPATIENT)
Dept: FAMILY MEDICINE CLINIC | Facility: CLINIC | Age: 21
End: 2021-11-15

## 2021-11-15 VITALS — OXYGEN SATURATION: 99 % | HEART RATE: 75 BPM | TEMPERATURE: 98.2 F

## 2021-11-15 DIAGNOSIS — J02.9 PHARYNGITIS, UNSPECIFIED ETIOLOGY: ICD-10-CM

## 2021-11-15 DIAGNOSIS — R50.9 FEVER AND CHILLS: Primary | ICD-10-CM

## 2021-11-15 PROCEDURE — 99213 OFFICE O/P EST LOW 20 MIN: CPT | Performed by: FAMILY MEDICINE

## 2021-11-15 PROCEDURE — 87426 SARSCOV CORONAVIRUS AG IA: CPT | Performed by: FAMILY MEDICINE

## 2021-11-15 PROCEDURE — 87635 SARS-COV-2 COVID-19 AMP PRB: CPT | Performed by: FAMILY MEDICINE

## 2021-11-15 RX ORDER — AZITHROMYCIN 250 MG/1
TABLET, FILM COATED ORAL
Qty: 6 TABLET | Refills: 0 | Status: SHIPPED | OUTPATIENT
Start: 2021-11-15 | End: 2022-01-05

## 2021-11-15 NOTE — PROGRESS NOTES
Chief Complaint  Fever (Fever and little cough x three days )    Subjective          Sweetie Hernandez presents to [facility]  URI   This is a new problem. Episode onset: 3 days ago. The problem has been unchanged. There has been no fever. Associated symptoms include congestion, coughing and a sore throat. Pertinent negatives include no abdominal pain, chest pain, diarrhea, dysuria, ear pain, headaches, joint pain, joint swelling, nausea, neck pain, plugged ear sensation, rash, rhinorrhea, sinus pain, sneezing, swollen glands, vomiting or wheezing. She has tried nothing for the symptoms.       Objective   Allergies   Allergen Reactions   • Hydrocodone Anaphylaxis   • Naproxen Swelling     Lip swelling     Immunization History   Administered Date(s) Administered   • COVID-19 (MODERNA) 1st, 2nd, 3rd Dose Only 07/28/2021, 08/09/2021, 08/26/2021   • Flu Vaccine Split Quad 10/27/2016   • Influenza, Unspecified 10/27/2016, 10/26/2018   • Tdap 08/01/2009, 05/30/2017, 07/12/2018       Vital Signs:   Vitals:    11/15/21 1513   Pulse: 75   Temp: 98.2 °F (36.8 °C)   SpO2: 99%       Physical Exam  Vitals reviewed.   Constitutional:       Appearance: Normal appearance. She is well-developed.   HENT:      Head: Normocephalic and atraumatic.      Right Ear: Tympanic membrane, ear canal and external ear normal.      Left Ear: Tympanic membrane, ear canal and external ear normal.      Nose: Nose normal.      Mouth/Throat:      Mouth: Mucous membranes are moist.      Pharynx: Oropharynx is clear. Posterior oropharyngeal erythema present. No oropharyngeal exudate.   Eyes:      Conjunctiva/sclera: Conjunctivae normal.      Pupils: Pupils are equal, round, and reactive to light.   Cardiovascular:      Rate and Rhythm: Normal rate and regular rhythm.      Pulses: Normal pulses.      Heart sounds: Normal heart sounds. No murmur heard.  No friction rub. No gallop.    Pulmonary:      Effort: Pulmonary effort  is normal.      Breath sounds: Normal breath sounds. No wheezing or rhonchi.   Abdominal:      General: Bowel sounds are normal. There is no distension.      Palpations: Abdomen is soft. There is no mass.      Tenderness: There is no abdominal tenderness. There is no guarding or rebound.      Hernia: No hernia is present.   Musculoskeletal:         General: Normal range of motion.      Cervical back: Normal range of motion and neck supple.   Skin:     General: Skin is warm and dry.      Capillary Refill: Capillary refill takes less than 2 seconds.   Neurological:      General: No focal deficit present.      Mental Status: She is alert and oriented to person, place, and time.      Cranial Nerves: No cranial nerve deficit.   Psychiatric:         Mood and Affect: Mood and affect normal.         Behavior: Behavior normal.         Thought Content: Thought content normal.         Judgment: Judgment normal.        Result Review :                 Assessment and Plan    Diagnoses and all orders for this visit:    1. Fever and chills (Primary)  -     POCT SARS-CoV-2 Antigen [place]            Follow Up   No follow-ups on file.  Patient was given instructions and counseling regarding her condition or for health maintenance advice. Please see specific information pulled into the AVS if appropriate.

## 2021-11-16 LAB — SARS-COV-2 N GENE RESP QL NAA+PROBE: NOT DETECTED

## 2021-11-29 ENCOUNTER — CLINICAL SUPPORT (OUTPATIENT)
Dept: ONCOLOGY | Facility: HOSPITAL | Age: 21
End: 2021-11-29

## 2021-11-29 ENCOUNTER — LAB (OUTPATIENT)
Dept: LAB | Facility: HOSPITAL | Age: 21
End: 2021-11-29

## 2021-11-29 DIAGNOSIS — D50.0 IRON DEFICIENCY ANEMIA DUE TO CHRONIC BLOOD LOSS: Primary | ICD-10-CM

## 2021-11-29 LAB
BASOPHILS # BLD AUTO: 0.05 10*3/MM3 (ref 0–0.2)
BASOPHILS NFR BLD AUTO: 0.7 % (ref 0–1.5)
DEPRECATED RDW RBC AUTO: 39.8 FL (ref 37–54)
EOSINOPHIL # BLD AUTO: 0.13 10*3/MM3 (ref 0–0.4)
EOSINOPHIL NFR BLD AUTO: 1.7 % (ref 0.3–6.2)
ERYTHROCYTE [DISTWIDTH] IN BLOOD BY AUTOMATED COUNT: 11.9 % (ref 12.3–15.4)
FERRITIN SERPL-MCNC: 81.3 NG/ML (ref 11–207)
HCT VFR BLD AUTO: 41.6 % (ref 34–46.6)
HGB BLD-MCNC: 13.9 G/DL (ref 12–15.9)
IMM GRANULOCYTES # BLD AUTO: 0.02 10*3/MM3 (ref 0–0.05)
IMM GRANULOCYTES NFR BLD AUTO: 0.3 % (ref 0–0.5)
IRON 24H UR-MRATE: 84 MCG/DL (ref 37–145)
IRON SATN MFR SERPL: 23 % (ref 14–48)
LYMPHOCYTES # BLD AUTO: 2.03 10*3/MM3 (ref 0.7–3.1)
LYMPHOCYTES NFR BLD AUTO: 26.6 % (ref 19.6–45.3)
MCH RBC QN AUTO: 30.5 PG (ref 26.6–33)
MCHC RBC AUTO-ENTMCNC: 33.4 G/DL (ref 31.5–35.7)
MCV RBC AUTO: 91.2 FL (ref 79–97)
MONOCYTES # BLD AUTO: 0.58 10*3/MM3 (ref 0.1–0.9)
MONOCYTES NFR BLD AUTO: 7.6 % (ref 5–12)
NEUTROPHILS NFR BLD AUTO: 4.83 10*3/MM3 (ref 1.7–7)
NEUTROPHILS NFR BLD AUTO: 63.1 % (ref 42.7–76)
NRBC BLD AUTO-RTO: 0 /100 WBC (ref 0–0.2)
PLATELET # BLD AUTO: 199 10*3/MM3 (ref 140–450)
PMV BLD AUTO: 10.1 FL (ref 6–12)
RBC # BLD AUTO: 4.56 10*6/MM3 (ref 3.77–5.28)
TIBC SERPL-MCNC: 368 MCG/DL (ref 249–505)
TRANSFERRIN SERPL-MCNC: 263 MG/DL (ref 200–360)
WBC NRBC COR # BLD: 7.64 10*3/MM3 (ref 3.4–10.8)

## 2021-11-29 PROCEDURE — G0463 HOSPITAL OUTPT CLINIC VISIT: HCPCS

## 2021-11-29 PROCEDURE — 85025 COMPLETE CBC W/AUTO DIFF WBC: CPT

## 2021-11-29 PROCEDURE — 83540 ASSAY OF IRON: CPT

## 2021-11-29 PROCEDURE — 36415 COLL VENOUS BLD VENIPUNCTURE: CPT

## 2021-11-29 PROCEDURE — 84466 ASSAY OF TRANSFERRIN: CPT

## 2021-11-29 PROCEDURE — 82728 ASSAY OF FERRITIN: CPT

## 2021-11-29 NOTE — NURSING NOTE
Pt here for CBC, iron panel and RN review. CBC is stable for this patient. Told pt I would call if iron panel came back abnormal. Copy of labs given to pt and pt v/u.    Lab Results   Component Value Date    WBC 7.64 11/29/2021    HGB 13.9 11/29/2021    HCT 41.6 11/29/2021    MCV 91.2 11/29/2021     11/29/2021     Iron panel reviewed and pt does not need iron at this time.

## 2021-12-03 ENCOUNTER — TREATMENT (OUTPATIENT)
Dept: PHYSICAL THERAPY | Facility: CLINIC | Age: 21
End: 2021-12-03

## 2021-12-03 DIAGNOSIS — M25.60 STIFFNESS IN JOINT: ICD-10-CM

## 2021-12-03 DIAGNOSIS — G89.29 CHRONIC BILATERAL LOW BACK PAIN, UNSPECIFIED WHETHER SCIATICA PRESENT: Primary | ICD-10-CM

## 2021-12-03 DIAGNOSIS — M54.50 CHRONIC BILATERAL LOW BACK PAIN, UNSPECIFIED WHETHER SCIATICA PRESENT: Primary | ICD-10-CM

## 2021-12-03 PROCEDURE — 97161 PT EVAL LOW COMPLEX 20 MIN: CPT | Performed by: PHYSICAL THERAPIST

## 2021-12-03 NOTE — PROGRESS NOTES
Physical Therapy Initial Evaluation and Plan of Care    Patient: Sweetie Hernandez   : 2000  Diagnosis/ICD-10 Code:  Chronic bilateral low back pain, unspecified whether sciatica present [M54.50, G89.29]  Referring practitioner: FELIZ Barragan*  Date of Initial Visit: 12/3/2021  Today's Date: 12/3/2021  Patient seen for 1 sessions           Subjective Questionnaire: Oswestry: 10/50 = 20% Disability      Subjective Evaluation    History of Present Illness  Mechanism of injury: The patient presents to physical therapy with complaints of bilateral low back pain that has been present for about 3 months without a specific LAQUITA. Her pain is located across her low back and is present every day. She experiences radiating pain into the back of her hips 3-4 days per week. She notices some numbness if she presses in on a certain spot on her low back. Her back pain is increased with prolonged sitting, prolonged standing, and bending down to  her kids. She was prescribed a muscle relaxer, but only takes it PRN. She mainly uses Midol or Tylenol for pain relief. 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    Pain  Current pain rating: 3  At best pain ratin  At worst pain ratin    Diagnostic Tests  X-ray: normal    Patient Goals  Patient goal: The patient reported that she would like to have less pain, improved ability to walk/stand/sit, and be able to play with her kids.           Objective          Postural Observations    Additional Postural Observation Details  Increased lumbar lordosis    Palpation     Additional Palpation Details  Hypertonicity noted in lumbar paraspinals bilaterally.Tenderness with palpation of bilateral piriformis and SI joints    Neurological Testing     Additional Neurological Details  Sensation to light touch intact and equal bilaterally    Seated slump: (-) bilaterally for increased sciatic neural tension    Supine passive SLR: (-)  bilaterally for increased sciatic neural tension    Active Range of Motion     Lumbar   Flexion: WFL  Extension: 15 degrees with pain  Left lateral flexion: Active left lumbar lateral flexion: Knee joint line, ipsilateral low back pain.   Right lateral flexion: Active right lumbar lateral flexion: Knee joint line, ipsilateral low back pain.   Left rotation: WFL  Right rotation: WFL    Strength/Myotome Testing     Left Hip   Planes of Motion   Flexion: 4  Extension: 4+  Abduction: 4    Right Hip   Planes of Motion   Flexion: 4-  Extension: 4+  Abduction: 4    Left Knee   Flexion: 5  Extension: 5    Right Knee   Flexion: 5  Extension: 5    Left Ankle/Foot   Dorsiflexion: 5    Right Ankle/Foot   Dorsiflexion: 5    Additional Strength Details  Abdominal strength graded 2+/5 via curl up test.    Ambulation     Observational Gait   Gait: within functional limits       See Exercise, Manual, and Modality Logs for complete treatment.     Assessment & Plan     Assessment  Impairments: abnormal muscle firing, abnormal muscle tone, abnormal or restricted ROM, activity intolerance, impaired physical strength, lacks appropriate home exercise program and pain with function  Functional Limitations: carrying objects, lifting, walking, pushing, uncomfortable because of pain, sitting, standing, stooping and unable to perform repetitive tasks  Assessment details: The patient presents to physical therapy with complaints of bilateral low back pain with referred pain into hips bilaterally. She presents with associated abdominal weakness, hip weakness, mild lumbar stiffness, and functional deficits (WESLEY). She does not present with radicular symptoms or with signs/symptoms of lumbar disc pathology. She would benefit from skilled PT to improve hip/core stability and to allow the patient to return to her prior level of function.  Prognosis: good    Goals  Plan Goals: LOW BACK PROBLEMS:    1. The patient complains of low back pain.  LTG 1: 12  weeks:  The patient will report a pain rating of 1/10 or better in order to improve  tolerance to activities of daily living and improve sleep quality.  STATUS:  New  STG 1a: 6 weeks:  The patient will report a pain rating of 2/10 or better.  STATUS:  New  TREATMENT:  Therapeutic exercises, manual therapy, aquatic therapy, home exercise   instruction, and modalities as needed for pain to include:  electrical stimulation, moist heat, ice,   ultrasound, and diathermy.      2. The patient demonstrates weakness of the bilateral hips.  LTG 2: 12 weeks:  The patient will demonstrate 4+ /5 strength for bilateral hip flexion, abduction,  and extension in order to improve hip stability.  STATUS:  New  STG 2a: 6 weeks:  The patient will demonstrate 4 /5 strength for bilateral hip flexion, abduction,  and extension.  STATUS:  New  TREATMENT: Therapeutic exercises, manual therapy, aquatic therapy, home exercise instruction,  and modalities as needed for pain to include:  electrical stimulation, moist heat, ice, ultrasound, and   diathermy.    2. The patient demonstrates abdominal weakness.  LTG 2: 12 weeks:  The patient will demonstrate 4/5 abdominal strength by completing curl up test with   Arms extended without hip flexor bias.  STATUS:  New  STG 2a: 6 weeks:  The patient will demonstrate 3/5 abdominal strength by completing curl up test with   Arms crossed without hip flexor bias.  STATUS:  New  TREATMENT: Therapeutic exercises, manual therapy, aquatic therapy, home exercise instruction,  and modalities as needed for pain to include:  electrical stimulation, moist heat, ice, ultrasound, and   diathermy.    4. The patient has limited lumbar AROM  LTG 4: 12 weeks:  The patient will demonstrate lumbar AROM as follows: 65 of flexion and 20 degrees of extension.  STATUS:  New  TREATMENT: Manual therapy, therapeutic exercise, home exercise instruction, and modalities as needed to include: moist heat, electrical stimulation, and  ultrasound.      5. Mobility: Walking/Moving Around Functional Limitation    LTG 5: 12 weeks:  The patient will demonstrate 0 % limitation by achieving a score of 0/50 on the WESLEY.  STATUS:  New  STG 5 a: 6 weeks:  The patient will demonstrate 10 % limitation by achieving a score of 5/50 on the WESLEY.    STATUS:  New  TREATMENT:  Manual therapy, therapeutic exercise, home exercise instruction, and modalities as needed to include: moist heat, electrical stimulation, and ultrasound.    Plan  Therapy options: will be seen for skilled therapy services  Planned modality interventions: cryotherapy, electrical stimulation/Russian stimulation, TENS, dry needling and traction  Planned therapy interventions: balance/weight-bearing training, ADL retraining, soft tissue mobilization, strengthening, stretching, therapeutic activities, joint mobilization, home exercise program, functional ROM exercises, flexibility, body mechanics training, postural training, neuromuscular re-education, manual therapy, abdominal trunk stabilization, IADL retraining and spinal/joint mobilization  Frequency: 2x week  Duration in weeks: 12  Treatment plan discussed with: patient        Visit Diagnoses:    ICD-10-CM ICD-9-CM   1. Chronic bilateral low back pain, unspecified whether sciatica present  M54.50 724.2    G89.29 338.29   2. Stiffness in joint  M25.60 719.50       History # of Personal Factors and/or Comorbidities: LOW (0)  Examination of Body System(s): # of elements: LOW (1-2)  Clinical Presentation: STABLE   Clinical Decision Making: LOW       Timed:         Manual Therapy:    0     mins  46139;     Therapeutic Exercise:    0     mins  51335;     Neuromuscular Eladio:    0    mins  36344;    Therapeutic Activity:     0     mins  49742;     Gait Trainin     mins  22748;     Ultrasound:     0     mins  64007;    Ionto                               0    mins   32535  Self Care                       0     mins   45359  Canalith Repos    0      mins 53553      Un-Timed:  Electrical Stimulation:    0     mins  31947 ( );  Dry Needling     0     mins self-pay  Traction     0     mins 56451  Low Eval     30     Mins  14034  Mod Eval     0     Mins  15708  High Eval                       0     Mins  56740  Re-Eval                           0    mins  78714    Timed Treatment:   0   mins   Total Treatment:     30   mins    PT SIGNATURE: Stu Davis PT    Electronically signed 12/3/2021    [place] License: PT - [id number]         Initial Certification  Certification Period: 12/3/2021 thru 3/2/2022  I certify that the therapy services are furnished while this patient is under my care.  The services outlined above are required by this patient, and will be reviewed every 90 days.     PHYSICIAN: Adelina Ortiz, APRN      DATE:     Please sign and return via fax to [phone]. Thank you, [facility].

## 2021-12-07 ENCOUNTER — TREATMENT (OUTPATIENT)
Dept: PHYSICAL THERAPY | Facility: CLINIC | Age: 21
End: 2021-12-07

## 2021-12-07 DIAGNOSIS — M25.60 STIFFNESS IN JOINT: ICD-10-CM

## 2021-12-07 DIAGNOSIS — G89.29 CHRONIC BILATERAL LOW BACK PAIN, UNSPECIFIED WHETHER SCIATICA PRESENT: Primary | ICD-10-CM

## 2021-12-07 DIAGNOSIS — M54.50 CHRONIC BILATERAL LOW BACK PAIN, UNSPECIFIED WHETHER SCIATICA PRESENT: Primary | ICD-10-CM

## 2021-12-07 PROCEDURE — 97110 THERAPEUTIC EXERCISES: CPT | Performed by: PHYSICAL THERAPIST

## 2021-12-07 PROCEDURE — 97530 THERAPEUTIC ACTIVITIES: CPT | Performed by: PHYSICAL THERAPIST

## 2021-12-07 NOTE — PROGRESS NOTES
Physical Therapy Daily Progress Note    Patient: Sweetie Hernandez   : 2000  Diagnosis/ICD-10 Code:  Chronic bilateral low back pain, unspecified whether sciatica present [M54.50, G89.29]  Referring practitioner: FELIZ Barragan*  Date of Initial Visit: Type: THERAPY  Noted: 12/3/2021  Today's Date: 2021  Patient seen for 2 sessions           Subjective   The patient reported that her back feels okay today. She has tried to do her HEP regularly, but it has been challenging to find time so far. She is working on it.    Objective   See Exercise, Manual, and Modality Logs for complete treatment.     Assessment/Plan  The patient demonstrated good tolerance to all hip/core strengthening exercise today. Continue to progress per patient tolerance.       Timed:  Manual Therapy:    0     mins  35900;  Therapeutic Exercise:    8     mins  16083;     Neuromuscular Eladio:   0    mins  49718;    Therapeutic Activity:     15     mins  30691;     Gait Trainin     mins  05597;     Aquatics                         0      mins  81970    Un-timed:  Mechanical Traction      0     mins  41005  Dry Needling     0     mins self-pay  Electrical Stimulation:    0     mins  12835 ( )  No Charge                     7      mins;      Timed Treatment:   23   mins   Total Treatment:     30   mins    Stu Davis PT  Physical Therapist    Electronically signed 2021    [place] License: PT - [id number]

## 2021-12-10 ENCOUNTER — OFFICE VISIT (OUTPATIENT)
Dept: FAMILY MEDICINE CLINIC | Facility: CLINIC | Age: 21
End: 2021-12-10

## 2021-12-10 VITALS
WEIGHT: 176 LBS | TEMPERATURE: 98.6 F | OXYGEN SATURATION: 98 % | HEART RATE: 102 BPM | DIASTOLIC BLOOD PRESSURE: 82 MMHG | SYSTOLIC BLOOD PRESSURE: 128 MMHG | BODY MASS INDEX: 30.05 KG/M2 | HEIGHT: 64 IN

## 2021-12-10 DIAGNOSIS — N89.8 VAGINAL ITCHING: ICD-10-CM

## 2021-12-10 DIAGNOSIS — R30.0 BURNING WITH URINATION: Primary | ICD-10-CM

## 2021-12-10 LAB
BILIRUB BLD-MCNC: NEGATIVE MG/DL
CLARITY, POC: CLEAR
COLOR UR: YELLOW
EXPIRATION DATE: ABNORMAL
GLUCOSE UR STRIP-MCNC: NEGATIVE MG/DL
KETONES UR QL: NEGATIVE
LEUKOCYTE EST, POC: ABNORMAL
Lab: ABNORMAL
NITRITE UR-MCNC: NEGATIVE MG/ML
PH UR: 7 [PH] (ref 5–8)
PROT UR STRIP-MCNC: ABNORMAL MG/DL
RBC # UR STRIP: ABNORMAL /UL
SP GR UR: 1.02 (ref 1–1.03)
UROBILINOGEN UR QL: NORMAL

## 2021-12-10 PROCEDURE — 99213 OFFICE O/P EST LOW 20 MIN: CPT | Performed by: NURSE PRACTITIONER

## 2021-12-10 RX ORDER — FLUCONAZOLE 150 MG/1
150 TABLET ORAL ONCE
Qty: 1 TABLET | Refills: 0 | Status: SHIPPED | OUTPATIENT
Start: 2021-12-10 | End: 2021-12-10

## 2021-12-10 NOTE — PROGRESS NOTES
Chief Complaint  Vaginal Itching and Difficulty Urinating (BURNING)    Subjective          Sweetie Hernandez presents to [facility]  Patient presents with complaint of possible vaginal yeast infection    She reports vaginal itching and burning for 2 days.  Burning is exterior.  No urinary spasms, abdominal pain or significant flank pain.  She is having some back discomfort but is currently in physical therapy for back pain.  She reports no fever.  No nausea vomiting or diarrhea.  She states that she had an antibiotic about 2 weeks ago.  She has not used any medication for her symptoms.  Nothing from over-the-counter.  She has noticed a small amount of discharge in her underwear.  She reports having a yeast infection in the past that is similar to current symptoms.          Past Medical History:   Diagnosis Date   • Anxiety    • Bipolar disorder ([facility])    • Depression     meds prior to pregnancy   • Factor V Leiden ([facility])    • Preeclampsia    • Seasonal allergies    • Trichimoniasis    • Urinary tract infection    • Urogenital trichomoniasis        Allergies   Allergen Reactions   • Hydrocodone Anaphylaxis   • Naproxen Swelling     Lip swelling        Past Surgical History:   Procedure Laterality Date   • ADENOIDECTOMY     •  SECTION N/A 2018    Procedure:  SECTION PRIMARY;  Surgeon: Radha Noel MD;  Location: [facility] LABOR DELIVERY;  Service: Obstetrics/Gynecology   • COLONOSCOPY  approx     normal per pt    • EYE SURGERY     • TONSILLECTOMY     • UPPER GASTROINTESTINAL ENDOSCOPY  approx 2010    reflux and scaring per pt         Social History     Socioeconomic History   • Marital status:      Spouse name: Meliton   • Years of education: High school   Tobacco Use   • Smoking status: Former Smoker     Packs/day: 1.00     Years: 0.00     Pack years: 0.00     Types: Cigarettes     Quit date: 2021     Years since quittin.4   • Smokeless tobacco:  Never Used   • Tobacco comment: quit smoking 06/20/2021   Vaping Use   • Vaping Use: Every day   • Substances: Nicotine, Flavoring   Substance and Sexual Activity   • Alcohol use: Yes     Alcohol/week: 3.0 standard drinks     Types: 3 Glasses of wine per week   • Drug use: No   • Sexual activity: Yes     Partners: Male     Birth control/protection: None       Family History   Problem Relation Age of Onset   • Diabetes Mother    • Hypertension Mother    • Heart disease Mother    • Thyroid disease Sister    • Heart disease Maternal Uncle    • Heart disease Maternal Grandmother    • Heart disease Maternal Grandfather    • Hypertension Maternal Grandfather    • Cancer Maternal Grandfather    • Colon cancer Cousin         Health Maintenance Due   Topic Date Due   • ANNUAL PHYSICAL  Never done   • HPV VACCINES (1 - 2-dose series) Never done   • PAP SMEAR  Never done   • INFLUENZA VACCINE  08/01/2021   • COVID-19 Vaccine (2 - Moderna 2-dose series) 09/06/2021        Last Completed Pap Smear     This patient has no relevant Health Maintenance data.          Last Completed Mammogram     This patient has no relevant Health Maintenance data.          Last Completed Colonoscopy     This patient has no relevant Health Maintenance data.          Current Outpatient Medications on File Prior to Visit   Medication Sig   • doxepin (SINEquan) 25 MG capsule Take 1 capsule by mouth Daily.   • fluticasone (FLONASE) 50 MCG/ACT nasal spray 2 sprays into the nostril(s) as directed by provider Daily.   • OLANZapine zydis (zyPREXA) 5 MG disintegrating tablet TAKE 1/2 TO 1 TABLET BY MOUTH EVERY NIGHT AT BEDTIME AS NEEDED   • OXcarbazepine (Trileptal) 300 MG tablet Take 300 mg by mouth 2 (Two) Times a Day.   • tiZANidine (ZANAFLEX) 4 MG tablet Take 1 tablet by mouth Every 8 (Eight) Hours As Needed for Muscle Spasms.   • azithromycin (Zithromax Z-Reid) 250 MG tablet Take 2 tablets by mouth on day 1, then 1 tablet daily on days 2-5     No current  "facility-administered medications on file prior to visit.       Immunization History   Administered Date(s) Administered   • COVID-19 (MODERNA) 1st, 2nd, 3rd Dose Only 07/28/2021, 08/09/2021   • Flu Vaccine Split Quad 10/27/2016   • Influenza, Unspecified 10/27/2016, 10/26/2018   • Tdap 08/01/2009, 05/30/2017, 07/12/2018       Review of Systems     Objective     /82   Pulse 102   Temp 98.6 °F (37 °C)   Ht 162.6 cm (64\")   Wt 79.8 kg (176 lb)   SpO2 98%   BMI 30.21 kg/m²         Physical Exam  Vitals and nursing note reviewed.   Constitutional:       Appearance: Normal appearance.   Cardiovascular:      Rate and Rhythm: Normal rate and regular rhythm.      Heart sounds: Normal heart sounds.   Pulmonary:      Effort: Pulmonary effort is normal.      Breath sounds: Normal breath sounds.   Abdominal:      General: Bowel sounds are normal. There is no distension.      Palpations: Abdomen is soft.      Tenderness: There is no abdominal tenderness.   Skin:     General: Skin is warm and dry.   Neurological:      Mental Status: She is alert.   Psychiatric:         Mood and Affect: Mood normal.           Result Review :             \plain       POCT urinalysis dipstick, automated (12/10/2021 09:31)  Urinalysis results reviewed.  Results are consistent with probable yeast infection.       Assessment and Plan        Diagnoses and all orders for this visit:    1. Burning with urination (Primary)  -     POCT urinalysis dipstick, automated    2. Vaginal itching  -     POCT urinalysis dipstick, automated  -     fluconazole (Diflucan) 150 MG tablet; Take 1 tablet by mouth 1 (One) Time for 1 dose.  Dispense: 1 tablet; Refill: 0              Follow Up   Advised to drink plenty of water.  Advised to avoid scratching or rubbing vaginal area . Symptoms should begin to resolve in 1 to 2 days.  Return if symptoms worsen or fail to improve.    Patient was given instructions and counseling regarding her condition or for health " maintenance advice. Please see specific information pulled into the AVS if appropriate.

## 2021-12-29 ENCOUNTER — OFFICE VISIT (OUTPATIENT)
Dept: FAMILY MEDICINE CLINIC | Facility: CLINIC | Age: 21
End: 2021-12-29

## 2021-12-29 VITALS — TEMPERATURE: 98 F | HEART RATE: 78 BPM | OXYGEN SATURATION: 99 %

## 2021-12-29 DIAGNOSIS — Z78.9 ACUTE MEDICAL ILLNESS: Primary | ICD-10-CM

## 2021-12-29 PROCEDURE — 87426 SARSCOV CORONAVIRUS AG IA: CPT | Performed by: FAMILY MEDICINE

## 2021-12-29 PROCEDURE — 99213 OFFICE O/P EST LOW 20 MIN: CPT | Performed by: FAMILY MEDICINE

## 2021-12-29 NOTE — PROGRESS NOTES
Chief Complaint    Nausea    Subjective      Sweetie Hernandez presents to [facility]     History of Present Illness    1.) FEVER AT WORK : Patient reports being sent home today due to a 'fever of 99.8 F.' She denies any cough. No chills. No loss of taste of smell.     Objective      Vital Signs:     Pulse 78   Temp 98 °F (36.7 °C)   SpO2 99%       Physical Exam  Vitals reviewed.   Constitutional:       General: She is not in acute distress.     Appearance: Normal appearance. She is well-developed.   HENT:      Head: Normocephalic and atraumatic.      Right Ear: Hearing and external ear normal. Tympanic membrane is not injected or bulging.      Left Ear: Hearing and external ear normal. Tympanic membrane is not injected or bulging.      Nose: Nose normal. No rhinorrhea.      Mouth/Throat:      Pharynx: No oropharyngeal exudate.   Eyes:      General: Lids are normal.         Right eye: No discharge.         Left eye: No discharge.      Conjunctiva/sclera: Conjunctivae normal.   Pulmonary:      Effort: Pulmonary effort is normal.      Breath sounds: Normal breath sounds.   Abdominal:      General: There is no distension.   Musculoskeletal:         General: No swelling.      Cervical back: Neck supple.   Skin:     Coloration: Skin is not jaundiced.      Findings: No erythema.   Neurological:      Mental Status: She is alert. Mental status is at baseline.   Psychiatric:         Mood and Affect: Mood and affect normal.         Thought Content: Thought content normal.     Assessment and Plan    Diagnoses and all orders for this visit:    1. Acute medical illness (Primary)  Comments:  1.) Advised of clinically relevant fever. Testing as noted. PE unremarkable. Patient can return to work on 12.30.21. Neg rapid testing.  Orders:  -     POCT SARS-CoV-2 Antigen [place]    Follow Up     Return if symptoms worsen or fail to improve.     Patient was given instructions and counseling regarding her  condition or for health maintenance advice. Please see specific information pulled into the AVS if appropriate.

## 2022-01-05 ENCOUNTER — OFFICE VISIT (OUTPATIENT)
Dept: FAMILY MEDICINE CLINIC | Facility: CLINIC | Age: 22
End: 2022-01-05

## 2022-01-05 VITALS — HEART RATE: 79 BPM | TEMPERATURE: 99.1 F | OXYGEN SATURATION: 99 %

## 2022-01-05 DIAGNOSIS — J02.9 PHARYNGITIS, UNSPECIFIED ETIOLOGY: ICD-10-CM

## 2022-01-05 DIAGNOSIS — R19.7 DIARRHEA, UNSPECIFIED TYPE: ICD-10-CM

## 2022-01-05 DIAGNOSIS — R11.2 NAUSEA AND VOMITING, INTRACTABILITY OF VOMITING NOT SPECIFIED, UNSPECIFIED VOMITING TYPE: Primary | ICD-10-CM

## 2022-01-05 LAB
EXPIRATION DATE: NORMAL
FLUAV AG NPH QL: NEGATIVE
FLUBV AG NPH QL: NEGATIVE
INTERNAL CONTROL: NORMAL
Lab: NORMAL

## 2022-01-05 PROCEDURE — 87804 INFLUENZA ASSAY W/OPTIC: CPT | Performed by: FAMILY MEDICINE

## 2022-01-05 PROCEDURE — 99213 OFFICE O/P EST LOW 20 MIN: CPT | Performed by: FAMILY MEDICINE

## 2022-01-05 PROCEDURE — U0004 COV-19 TEST NON-CDC HGH THRU: HCPCS | Performed by: FAMILY MEDICINE

## 2022-01-05 RX ORDER — ONDANSETRON 4 MG/1
4 TABLET, FILM COATED ORAL 4 TIMES DAILY PRN
Qty: 28 TABLET | Refills: 0 | Status: SHIPPED | OUTPATIENT
Start: 2022-01-05 | End: 2022-01-17

## 2022-01-05 RX ORDER — SACCHAROMYCES BOULARDII 250 MG
250 CAPSULE ORAL 2 TIMES DAILY
Qty: 20 CAPSULE | Refills: 0 | Status: SHIPPED | OUTPATIENT
Start: 2022-01-05 | End: 2022-01-15

## 2022-01-05 RX ORDER — AZITHROMYCIN 250 MG/1
TABLET, FILM COATED ORAL
Qty: 6 TABLET | Refills: 0 | Status: SHIPPED | OUTPATIENT
Start: 2022-01-05 | End: 2022-01-17

## 2022-01-05 RX ORDER — AMITRIPTYLINE HYDROCHLORIDE 25 MG/1
25 TABLET, FILM COATED ORAL
COMMUNITY
Start: 2021-12-13 | End: 2022-01-17

## 2022-01-05 NOTE — PROGRESS NOTES
Chief Complaint  Shortness of Breath (started yesterday), Vomiting, and Diarrhea    Subjective          Sweetie Hernandez presents to [facility]  Pt is fully vaccinated for covid.  Pt says no chance of pregnancy.    URI   This is a new problem. The current episode started yesterday. The problem has been gradually worsening. There has been no fever. Associated symptoms include congestion, coughing, diarrhea, joint pain, nausea and vomiting. Pertinent negatives include no abdominal pain, chest pain, dysuria, ear pain, headaches, joint swelling, neck pain, plugged ear sensation, rash, rhinorrhea, sinus pain, sneezing, sore throat, swollen glands or wheezing. Associated symptoms comments: Watery brown diarrhea.. She has tried nothing for the symptoms.       Objective   Allergies   Allergen Reactions   • Hydrocodone Anaphylaxis   • Naproxen Swelling     Lip swelling     Immunization History   Administered Date(s) Administered   • COVID-19 (MODERNA) 1st, 2nd, 3rd Dose Only 2021, 2021   • Flu Vaccine Split Quad 10/27/2016   • Influenza, Unspecified 10/27/2016, 10/26/2018   • Tdap 2009, 2017, 2018     Past Medical History:   Diagnosis Date   • Anxiety    • Bipolar disorder (HCC)    • Depression     meds prior to pregnancy   • Factor V Leiden (HCC)    • Preeclampsia    • Seasonal allergies    • Trichimoniasis    • Urinary tract infection    • Urogenital trichomoniasis       Past Surgical History:   Procedure Laterality Date   • ADENOIDECTOMY     •  SECTION N/A 2018    Procedure:  SECTION PRIMARY;  Surgeon: Radha Noel MD;  Location: [facility] LABOR DELIVERY;  Service: Obstetrics/Gynecology   • COLONOSCOPY  approx     normal per pt    • EYE SURGERY     • TONSILLECTOMY     • UPPER GASTROINTESTINAL ENDOSCOPY  approx     reflux and scaring per pt       Social History     Socioeconomic History   • Marital status:      Spouse name:  Meliton   • Years of education: High school   Tobacco Use   • Smoking status: Former Smoker     Packs/day: 1.00     Years: 0.00     Pack years: 0.00     Types: Cigarettes     Quit date: 2021     Years since quittin.5   • Smokeless tobacco: Never Used   • Tobacco comment: quit smoking 2021   Vaping Use   • Vaping Use: Every day   • Substances: Nicotine, Flavoring   Substance and Sexual Activity   • Alcohol use: Yes     Alcohol/week: 3.0 standard drinks     Types: 3 Glasses of wine per week   • Drug use: No   • Sexual activity: Yes     Partners: Male     Birth control/protection: None        Current Outpatient Medications:   •  amitriptyline (ELAVIL) 25 MG tablet, Take 25 mg by mouth every night at bedtime., Disp: , Rfl:   •  doxepin (SINEquan) 25 MG capsule, Take 1 capsule by mouth Daily., Disp: , Rfl:   •  fluticasone (FLONASE) 50 MCG/ACT nasal spray, 2 sprays into the nostril(s) as directed by provider Daily., Disp: 16 g, Rfl: 11  •  OLANZapine zydis (zyPREXA) 5 MG disintegrating tablet, TAKE 1/2 TO 1 TABLET BY MOUTH EVERY NIGHT AT BEDTIME AS NEEDED, Disp: , Rfl:   •  OXcarbazepine (Trileptal) 300 MG tablet, Take 300 mg by mouth 2 (Two) Times a Day., Disp: , Rfl:   •  tiZANidine (ZANAFLEX) 4 MG tablet, Take 1 tablet by mouth Every 8 (Eight) Hours As Needed for Muscle Spasms., Disp: 30 tablet, Rfl: 0  •  azithromycin (Zithromax Z-Reid) 250 MG tablet, Take 2 tablets by mouth on day 1, then 1 tablet daily on days 2-5, Disp: 6 tablet, Rfl: 0  •  ondansetron (Zofran) 4 MG tablet, Take 1 tablet by mouth 4 (Four) Times a Day As Needed for Nausea or Vomiting., Disp: 28 tablet, Rfl: 0  •  saccharomyces boulardii (Florastor) 250 MG capsule, Take 1 capsule by mouth 2 (Two) Times a Day for 10 days., Disp: 20 capsule, Rfl: 0   Family History   Problem Relation Age of Onset   • Diabetes Mother    • Hypertension Mother    • Heart disease Mother    • Thyroid disease Sister    • Heart disease Maternal Uncle    • Heart  disease Maternal Grandmother    • Heart disease Maternal Grandfather    • Hypertension Maternal Grandfather    • Cancer Maternal Grandfather    • Colon cancer Cousin           Vital Signs:   Vitals:    01/05/22 1610   Pulse: 79   Temp: 99.1 °F (37.3 °C)   SpO2: 99%       Physical Exam  Vitals reviewed.   Constitutional:       Appearance: Normal appearance. She is well-developed.   HENT:      Head: Normocephalic and atraumatic.      Right Ear: External ear normal.      Left Ear: External ear normal.      Nose: Nose normal.      Mouth/Throat:      Mouth: Mucous membranes are moist.      Pharynx: Oropharynx is clear. Posterior oropharyngeal erythema present. No oropharyngeal exudate.   Eyes:      Conjunctiva/sclera: Conjunctivae normal.      Pupils: Pupils are equal, round, and reactive to light.   Cardiovascular:      Rate and Rhythm: Normal rate and regular rhythm.      Pulses: Normal pulses.      Heart sounds: Normal heart sounds. No murmur heard.  No friction rub. No gallop.    Pulmonary:      Effort: Pulmonary effort is normal.      Breath sounds: Normal breath sounds. No wheezing or rhonchi.   Abdominal:      General: Abdomen is flat. Bowel sounds are normal. There is no distension.      Palpations: Abdomen is soft. There is no mass.      Tenderness: There is no abdominal tenderness. There is no guarding or rebound.      Hernia: No hernia is present.   Musculoskeletal:         General: Normal range of motion.      Cervical back: Normal range of motion and neck supple.   Skin:     General: Skin is warm and dry.      Capillary Refill: Capillary refill takes less than 2 seconds.   Neurological:      General: No focal deficit present.      Mental Status: She is alert and oriented to person, place, and time.      Cranial Nerves: No cranial nerve deficit.   Psychiatric:         Mood and Affect: Mood and affect normal.         Behavior: Behavior normal.         Thought Content: Thought content normal.         Judgment:  Judgment normal.        Result Review :                 Assessment and Plan    Diagnoses and all orders for this visit:    1. Nausea and vomiting, intractability of vomiting not specified, unspecified vomiting type (Primary)  -     COVID-19,APTIMA PANTHER([place]O),[facility]NA, NP/OP SWAB IN UTM/VTM/SALINE TRANSPORT MEDIA,24 HR TAT - Swab, Nasopharynx  -     ondansetron (Zofran) 4 MG tablet; Take 1 tablet by mouth 4 (Four) Times a Day As Needed for Nausea or Vomiting.  Dispense: 28 tablet; Refill: 0    2. Diarrhea, unspecified type  -     COVID-19,APTIMA PANTHER([place]),[facility]NA, NP/OP SWAB IN UTM/VTM/SALINE TRANSPORT MEDIA,24 HR TAT - Swab, Nasopharynx  -     saccharomyces boulardii (Florastor) 250 MG capsule; Take 1 capsule by mouth 2 (Two) Times a Day for 10 days.  Dispense: 20 capsule; Refill: 0    3. Pharyngitis, unspecified etiology  -     POCT Influenza A/B  -     azithromycin (Zithromax Z-Reid) 250 MG tablet; Take 2 tablets by mouth on day 1, then 1 tablet daily on days 2-5  Dispense: 6 tablet; Refill: 0            Follow Up   Return in about 1 week (around 1/12/2022), or if symptoms worsen or fail to improve.  Patient was given instructions and counseling regarding her condition or for health maintenance advice. Please see specific information pulled into the AVS if appropriate.        no

## 2022-01-06 LAB — SARS-COV-2 RNA PNL SPEC NAA+PROBE: NOT DETECTED

## 2022-01-11 ENCOUNTER — OFFICE VISIT (OUTPATIENT)
Dept: FAMILY MEDICINE CLINIC | Facility: CLINIC | Age: 22
End: 2022-01-11

## 2022-01-11 VITALS
DIASTOLIC BLOOD PRESSURE: 78 MMHG | WEIGHT: 179 LBS | TEMPERATURE: 98.2 F | HEART RATE: 91 BPM | SYSTOLIC BLOOD PRESSURE: 116 MMHG | BODY MASS INDEX: 30.73 KG/M2 | OXYGEN SATURATION: 98 %

## 2022-01-11 DIAGNOSIS — N92.6 MISSED MENSES: Primary | ICD-10-CM

## 2022-01-11 LAB
B-HCG UR QL: POSITIVE
EXPIRATION DATE: ABNORMAL
HCG INTACT+B SERPL-ACNC: 501 MIU/ML
INTERNAL NEGATIVE CONTROL: ABNORMAL
INTERNAL POSITIVE CONTROL: ABNORMAL
Lab: ABNORMAL

## 2022-01-11 PROCEDURE — 81025 URINE PREGNANCY TEST: CPT | Performed by: FAMILY MEDICINE

## 2022-01-11 PROCEDURE — 84702 CHORIONIC GONADOTROPIN TEST: CPT | Performed by: FAMILY MEDICINE

## 2022-01-11 PROCEDURE — 99213 OFFICE O/P EST LOW 20 MIN: CPT | Performed by: FAMILY MEDICINE

## 2022-01-11 RX ORDER — BUSPIRONE HYDROCHLORIDE 5 MG/1
5 TABLET ORAL 2 TIMES DAILY
COMMUNITY
End: 2023-03-16

## 2022-01-11 NOTE — PROGRESS NOTES
Chief Complaint    Possible Pregnancy (2 at home positives, confirmation)    Subjective      Sweetie Hernandez presents to [facility]     History of Present Illness    1.) (+) PREGNANCY TEST : Patient is here for confirmation. She presents with no complaints/.    Objective      Vital Signs:     /78   Pulse 91   Temp 98.2 °F (36.8 °C)   Wt 81.2 kg (179 lb)   SpO2 98%   BMI 30.73 kg/m²       Physical Exam  Vitals reviewed.   Constitutional:       General: She is not in acute distress.     Appearance: Normal appearance. She is well-developed.   HENT:      Head: Normocephalic and atraumatic.      Right Ear: Hearing and external ear normal.      Left Ear: Hearing and external ear normal.      Nose: Nose normal.   Eyes:      General: Lids are normal.         Right eye: No discharge.         Left eye: No discharge.      Conjunctiva/sclera: Conjunctivae normal.   Pulmonary:      Effort: Pulmonary effort is normal.   Abdominal:      General: There is no distension.   Musculoskeletal:         General: No swelling.      Cervical back: Neck supple.   Skin:     Coloration: Skin is not jaundiced.      Findings: No erythema.   Neurological:      Mental Status: She is alert. Mental status is at baseline.   Psychiatric:         Mood and Affect: Mood and affect normal.         Thought Content: Thought content normal.     Assessment and Plan    Diagnoses and all orders for this visit:    1. Missed menses (Primary)  Comments:  1.) (+) Urine HCG. Advised to set up care with OB. Start prenatal vitamins. Quant HCG as noted per pt's request.  Orders:  -     POCT pregnancy, urine  -     hCG, Quantitative, Pregnancy    Follow Up     Return if symptoms worsen or fail to improve.     Patient was given instructions and counseling regarding her condition or for health maintenance advice. Please see specific information pulled into the AVS if appropriate.

## 2022-01-11 NOTE — PROGRESS NOTES
Venipuncture Blood Specimen Collection  Venipuncture performed in left arm by Kyung Clancy with good hemostasis. Patient tolerated the procedure well without complications.   01/11/22   Kyung Clancy

## 2022-01-17 ENCOUNTER — INITIAL PRENATAL (OUTPATIENT)
Dept: OBSTETRICS AND GYNECOLOGY | Facility: CLINIC | Age: 22
End: 2022-01-17

## 2022-01-17 VITALS — BODY MASS INDEX: 30.38 KG/M2 | WEIGHT: 177 LBS | SYSTOLIC BLOOD PRESSURE: 112 MMHG | DIASTOLIC BLOOD PRESSURE: 71 MMHG

## 2022-01-17 DIAGNOSIS — Z34.90 EARLY STAGE OF PREGNANCY: Primary | ICD-10-CM

## 2022-01-17 DIAGNOSIS — D68.51 FACTOR 5 LEIDEN MUTATION, HETEROZYGOUS: ICD-10-CM

## 2022-01-17 PROBLEM — N92.6 IRREGULAR MENSTRUAL CYCLE: Status: RESOLVED | Noted: 2021-07-01 | Resolved: 2022-01-17

## 2022-01-17 LAB
B-HCG UR QL: POSITIVE
EXPIRATION DATE: ABNORMAL
GLUCOSE UR STRIP-MCNC: NEGATIVE MG/DL
INTERNAL NEGATIVE CONTROL: NEGATIVE
INTERNAL POSITIVE CONTROL: POSITIVE
Lab: ABNORMAL
PROT UR STRIP-MCNC: NEGATIVE MG/DL

## 2022-01-17 PROCEDURE — 81025 URINE PREGNANCY TEST: CPT | Performed by: OBSTETRICS & GYNECOLOGY

## 2022-01-17 PROCEDURE — 99214 OFFICE O/P EST MOD 30 MIN: CPT | Performed by: OBSTETRICS & GYNECOLOGY

## 2022-01-17 RX ORDER — ASPIRIN 81 MG/1
81 TABLET ORAL DAILY
Qty: 90 TABLET | Refills: 2 | Status: SHIPPED | OUTPATIENT
Start: 2022-01-17 | End: 2023-03-16

## 2022-01-17 NOTE — PROGRESS NOTES
Chief Complaint   Patient presents with   • Initial Prenatal Visit     HPI- Pt is 21 y.o.  at 5w4d here for prenatal visit.  Patient presents for initial OB visit and states she is sure regarding her LMP.  She has no complaints today.  She has had 2 previous term deliveries.  Her first pregnancy was complicated by hypertension at the end of her pregnancy, but she was not on magnesium sulfate.  She had a shoulder dystocia during that delivery.  Her second pregnancy was a primary  at 39 weeks.  She does have a history of factor V Leiden, but no personal history of blood clots.  She was on aspirin with her last 2 pregnancies and Lovenox postpartum.  She is currently on BuSpar for history of anxiety.    ROS-     - No vaginal bleeding    GI- No abdominal pain    /71   Wt 80.3 kg (177 lb)   LMP 2021   BMI 30.38 kg/m²   Exam - See flow sheet    Fetal heart rate is normal    Assessment-  Diagnoses and all orders for this visit:    Early stage of pregnancy  -     OB Panel With HIV  -     Drug Profile Urine - 9 Drugs - Urine, Clean Catch  -     Urine Culture - Urine, Urine, Clean Catch  -     IGP,CtNgTv,rfx Aptima HPV ASCU  -     US Ob Transvaginal; Future    Factor 5 Leiden mutation, heterozygous (HCC)  -     aspirin (aspirin) 81 MG EC tablet; Take 1 tablet by mouth Daily.    Other orders  -     POC Urinalysis Dipstick    Recommend restarting aspirin and prescription was sent.  She states she is taking prenatal vitamins.  Patient follows with a psychiatrist and therapist for anxiety and states her moods are currently well controlled on BuSpar.  OB labs have been ordered and she will follow-up in 2 weeks with ultrasound.

## 2022-01-18 LAB
ABO GROUP BLD: NORMAL
AMPHETAMINES UR QL SCN: NEGATIVE NG/ML
BARBITURATES UR QL SCN: NEGATIVE NG/ML
BASOPHILS # BLD AUTO: 0.1 X10E3/UL (ref 0–0.2)
BASOPHILS NFR BLD AUTO: 1 %
BENZODIAZ UR QL: NEGATIVE NG/ML
BLD GP AB SCN SERPL QL: NEGATIVE
BZE UR QL: NEGATIVE NG/ML
CANNABINOIDS UR QL SCN: NEGATIVE NG/ML
EOSINOPHIL # BLD AUTO: 0.2 X10E3/UL (ref 0–0.4)
EOSINOPHIL NFR BLD AUTO: 2 %
ERYTHROCYTE [DISTWIDTH] IN BLOOD BY AUTOMATED COUNT: 12 % (ref 11.7–15.4)
HBV SURFACE AG SERPL QL IA: NEGATIVE
HCT VFR BLD AUTO: 42.2 % (ref 34–46.6)
HCV AB S/CO SERPL IA: <0.1 S/CO RATIO (ref 0–0.9)
HGB BLD-MCNC: 14.4 G/DL (ref 11.1–15.9)
HIV 1+2 AB+HIV1 P24 AG SERPL QL IA: NON REACTIVE
IMM GRANULOCYTES # BLD AUTO: 0 X10E3/UL (ref 0–0.1)
IMM GRANULOCYTES NFR BLD AUTO: 0 %
LYMPHOCYTES # BLD AUTO: 2 X10E3/UL (ref 0.7–3.1)
LYMPHOCYTES NFR BLD AUTO: 22 %
MCH RBC QN AUTO: 31.1 PG (ref 26.6–33)
MCHC RBC AUTO-ENTMCNC: 34.1 G/DL (ref 31.5–35.7)
MCV RBC AUTO: 91 FL (ref 79–97)
METHADONE UR QL SCN: NEGATIVE NG/ML
MONOCYTES # BLD AUTO: 0.5 X10E3/UL (ref 0.1–0.9)
MONOCYTES NFR BLD AUTO: 6 %
NEUTROPHILS # BLD AUTO: 6.2 X10E3/UL (ref 1.4–7)
NEUTROPHILS NFR BLD AUTO: 69 %
OPIATES UR QL: NEGATIVE NG/ML
PCP UR QL: NEGATIVE NG/ML
PLATELET # BLD AUTO: 240 X10E3/UL (ref 150–450)
PROPOXYPH UR QL SCN: NEGATIVE NG/ML
RBC # BLD AUTO: 4.63 X10E6/UL (ref 3.77–5.28)
RH BLD: POSITIVE
RPR SER QL: NON REACTIVE
RUBV IGG SERPL IA-ACNC: 1.86 INDEX
WBC # BLD AUTO: 9 X10E3/UL (ref 3.4–10.8)

## 2022-01-19 LAB
BACTERIA UR CULT: NORMAL
BACTERIA UR CULT: NORMAL
C TRACH RRNA CVX QL NAA+PROBE: NEGATIVE
CONV .: NORMAL
CYTOLOGIST CVX/VAG CYTO: NORMAL
CYTOLOGY CVX/VAG DOC CYTO: NORMAL
CYTOLOGY CVX/VAG DOC THIN PREP: NORMAL
DX ICD CODE: NORMAL
HIV 1 & 2 AB SER-IMP: NORMAL
N GONORRHOEA RRNA CVX QL NAA+PROBE: NEGATIVE
OTHER STN SPEC: NORMAL
STAT OF ADQ CVX/VAG CYTO-IMP: NORMAL
T VAGINALIS RRNA SPEC QL NAA+PROBE: NEGATIVE

## 2022-01-31 ENCOUNTER — ROUTINE PRENATAL (OUTPATIENT)
Dept: OBSTETRICS AND GYNECOLOGY | Facility: CLINIC | Age: 22
End: 2022-01-31

## 2022-01-31 VITALS — BODY MASS INDEX: 29.94 KG/M2 | SYSTOLIC BLOOD PRESSURE: 114 MMHG | DIASTOLIC BLOOD PRESSURE: 71 MMHG | WEIGHT: 174.4 LBS

## 2022-01-31 DIAGNOSIS — Z87.59 HISTORY OF SHOULDER DYSTOCIA IN PRIOR PREGNANCY: ICD-10-CM

## 2022-01-31 DIAGNOSIS — D68.51 FACTOR 5 LEIDEN MUTATION, HETEROZYGOUS: Primary | ICD-10-CM

## 2022-01-31 DIAGNOSIS — Z3A.01 7 WEEKS GESTATION OF PREGNANCY: ICD-10-CM

## 2022-01-31 PROBLEM — Z98.891 S/P CESAREAN SECTION: Status: RESOLVED | Noted: 2018-08-21 | Resolved: 2022-01-31

## 2022-01-31 PROCEDURE — 99213 OFFICE O/P EST LOW 20 MIN: CPT | Performed by: OBSTETRICS & GYNECOLOGY

## 2022-01-31 RX ORDER — PRENATAL VIT/IRON FUM/FOLIC AC 27MG-0.8MG
TABLET ORAL DAILY
COMMUNITY
End: 2023-03-16

## 2022-01-31 NOTE — PROGRESS NOTES
Chief Complaint   Patient presents with   • Routine Prenatal Visit     HPI- Pt is 21 y.o.  at 7w4d here for prenatal visit.  She is doing well with no major complaints.  She does report decreased appetite, but no significant vomiting.    ROS-     - No vaginal bleeding    GI- No abdominal pain    /71   Wt 79.1 kg (174 lb 6.4 oz)   LMP 2021   BMI 29.94 kg/m²   Exam - See flow sheet    Fetal heart rate is normal    Assessment-  Diagnoses and all orders for this visit:    Factor 5 Leiden mutation, heterozygous (HCC)    History of shoulder dystocia in prior pregnancy    7 weeks gestation of pregnancy    Other orders  -     Prenatal Vit-Fe Fumarate-FA (prenatal vitamin 27-0.8) 27-0.8 MG tablet tablet; Take  by mouth Daily.    Reviewed initial OB labs and ultrasound from today.  She is doing well and will follow up with me in 4 weeks.

## 2022-02-23 ENCOUNTER — OFFICE VISIT (OUTPATIENT)
Dept: FAMILY MEDICINE CLINIC | Facility: CLINIC | Age: 22
End: 2022-02-23

## 2022-02-23 VITALS
TEMPERATURE: 98.3 F | DIASTOLIC BLOOD PRESSURE: 70 MMHG | BODY MASS INDEX: 30.65 KG/M2 | WEIGHT: 173 LBS | SYSTOLIC BLOOD PRESSURE: 118 MMHG | HEIGHT: 63 IN | OXYGEN SATURATION: 99 % | HEART RATE: 88 BPM

## 2022-02-23 DIAGNOSIS — R05.9 COUGH: ICD-10-CM

## 2022-02-23 DIAGNOSIS — R51.9 ACUTE INTRACTABLE HEADACHE, UNSPECIFIED HEADACHE TYPE: ICD-10-CM

## 2022-02-23 DIAGNOSIS — Z3A.10 10 WEEKS GESTATION OF PREGNANCY: Primary | ICD-10-CM

## 2022-02-23 DIAGNOSIS — R11.0 NAUSEA: ICD-10-CM

## 2022-02-23 LAB
EXPIRATION DATE: NORMAL
INTERNAL CONTROL: NORMAL
Lab: NORMAL
SARS-COV-2 AG UPPER RESP QL IA.RAPID: NOT DETECTED
SARS-COV-2 RNA PNL SPEC NAA+PROBE: NOT DETECTED

## 2022-02-23 PROCEDURE — U0004 COV-19 TEST NON-CDC HGH THRU: HCPCS | Performed by: NURSE PRACTITIONER

## 2022-02-23 PROCEDURE — 99213 OFFICE O/P EST LOW 20 MIN: CPT | Performed by: NURSE PRACTITIONER

## 2022-02-23 PROCEDURE — 87426 SARSCOV CORONAVIRUS AG IA: CPT | Performed by: NURSE PRACTITIONER

## 2022-02-23 RX ORDER — DOXYLAMINE SUCCINATE 25 MG/1
TABLET ORAL
Qty: 20 TABLET | Refills: 0 | Status: SHIPPED | OUTPATIENT
Start: 2022-02-23 | End: 2022-03-28

## 2022-02-23 RX ORDER — DIPHENHYDRAMINE HYDROCHLORIDE 25 MG/1
CAPSULE ORAL
Qty: 20 TABLET | Refills: 0 | Status: SHIPPED | OUTPATIENT
Start: 2022-02-23 | End: 2022-03-28

## 2022-02-23 NOTE — PROGRESS NOTES
Chief Complaint  Headache (Nause and headache. 10 weeks pregnant )    Subjective            Sweetie Hernandez presents to [facility]  History of Present Illness     Patient presents to the office today with acute onset of headache yesterday.  She describes the headache as a migraine.  It is not improving with use of Tylenol.  She states that she is 10 weeks gestation, she will be 11 weeks tomorrow.  Up until this point she really has not had any problem with headaches.  The headache is causing nausea.  If she drinks anything, even water, she has been vomiting.  She denies any fever or body aches, but has had chills.  Denies ear pain or sore throat.  No runny nose or congestion.  She has had cough.  The cough started today.  She denies any wheezing or shortness of breath.    She has establish care with OB.  She is taking an aspirin daily due to factor V Leiden mutation.  She is taking her prenatal.  She does not have anything for nausea as she has not really experienced nausea until now.    PHQ-2 Total Score: 0      Past Medical History:   Diagnosis Date   • Anxiety    • Bipolar disorder (HCC)    • Depression     meds prior to pregnancy   • Factor V Leiden (HCC)    • Preeclampsia    • Seasonal allergies    • Trichimoniasis    • Urinary tract infection    • Urogenital trichomoniasis        Allergies   Allergen Reactions   • Hydrocodone Anaphylaxis   • Naproxen Swelling     Lip swelling        Past Surgical History:   Procedure Laterality Date   • ADENOIDECTOMY     •  SECTION N/A 2018    Procedure:  SECTION PRIMARY;  Surgeon: Radha Noel MD;  Location: [facility] DELIVERY;  Service: Obstetrics/Gynecology   • COLONOSCOPY  approx     normal per pt    • EYE SURGERY     • TONSILLECTOMY     • UPPER GASTROINTESTINAL ENDOSCOPY  approx     reflux and scaring per pt         Social History     Tobacco Use   • Smoking status: Former Smoker     Packs/day: 1.00     " Years: 0.00     Pack years: 0.00     Types: Cigarettes, Cigars     Quit date: 2021     Years since quittin.6   • Smokeless tobacco: Never Used   • Tobacco comment: evapes   Vaping Use   • Vaping Use: Every day   • Substances: Nicotine, Flavoring   Substance Use Topics   • Alcohol use: Yes     Alcohol/week: 3.0 standard drinks     Types: 3 Glasses of wine per week   • Drug use: No       Family History   Problem Relation Age of Onset   • Diabetes Mother    • Hypertension Mother    • Heart disease Mother    • Thyroid disease Sister    • Ovarian cancer Sister    • Heart disease Maternal Uncle    • Heart disease Maternal Grandmother    • Heart disease Maternal Grandfather    • Hypertension Maternal Grandfather    • Cancer Maternal Grandfather    • Colon cancer Cousin         Health Maintenance Due   Topic Date Due   • ANNUAL PHYSICAL  Never done   • Pneumococcal Vaccine 0-64 (1 of 2 - PPSV23) Never done   • HPV VACCINES (1 - 2-dose series) Never done   • PAP SMEAR  Never done   • INFLUENZA VACCINE  2021   • COVID-19 Vaccine (3 - Booster) 2022        Current Outpatient Medications on File Prior to Visit   Medication Sig   • aspirin (aspirin) 81 MG EC tablet Take 1 tablet by mouth Daily.   • busPIRone (BUSPAR) 5 MG tablet Take 5 mg by mouth 2 (Two) Times a Day.   • Prenatal Vit-Fe Fumarate-FA (prenatal vitamin 27-0.8) 27-0.8 MG tablet tablet Take  by mouth Daily.     No current facility-administered medications on file prior to visit.       Immunization History   Administered Date(s) Administered   • COVID-19 (MODERNA) 1st, 2nd, 3rd Dose Only 2021, 2021   • Flu Vaccine Split Quad 10/27/2016   • Influenza, Unspecified 10/27/2016, 10/26/2018   • Tdap 2009, 2017, 2018       Review of Systems     Objective     /70   Pulse 88   Temp 98.3 °F (36.8 °C)   Ht 160 cm (63\")   Wt 78.5 kg (173 lb)   SpO2 99%   BMI 30.65 kg/m²       Physical Exam  Vitals reviewed. "   Constitutional:       General: She is not in acute distress.     Appearance: Normal appearance. She is well-developed.   HENT:      Head: Normocephalic and atraumatic.      Right Ear: Tympanic membrane, ear canal and external ear normal. There is no impacted cerumen.      Left Ear: Tympanic membrane, ear canal and external ear normal. There is no impacted cerumen.      Nose: Nose normal.      Mouth/Throat:      Mouth: Mucous membranes are moist.      Pharynx: Oropharynx is clear. No oropharyngeal exudate or posterior oropharyngeal erythema.   Eyes:      General: No scleral icterus.     Conjunctiva/sclera: Conjunctivae normal.   Neck:      Trachea: Trachea normal.   Cardiovascular:      Rate and Rhythm: Normal rate and regular rhythm.      Pulses: Normal pulses.      Heart sounds: No murmur heard.      Pulmonary:      Effort: Pulmonary effort is normal. No respiratory distress.      Breath sounds: Normal breath sounds. No wheezing, rhonchi or rales.   Abdominal:      General: Bowel sounds are normal. There is no distension.      Palpations: Abdomen is soft. There is no mass.      Tenderness: There is no abdominal tenderness. There is no guarding or rebound.   Musculoskeletal:         General: Normal range of motion.      Cervical back: Normal range of motion and neck supple.   Lymphadenopathy:      Cervical: No cervical adenopathy.   Skin:     General: Skin is warm and dry.   Neurological:      Mental Status: She is alert and oriented to person, place, and time.   Psychiatric:         Mood and Affect: Mood and affect normal.         Behavior: Behavior normal.         Thought Content: Thought content normal.         Judgment: Judgment normal.         Result Review :     The following data was reviewed by: LINDA Barragan on 02/23/2022:    POCT SARS-CoV-2 Antigen [place] (02/23/2022 12:53)    POC Urinalysis Dipstick (01/17/2022)  IGP,CtNgTv,rfx Aptima HPV ASCU (01/17/2022 02:47)  Drug Profile Urine - 9 Drugs -  Urine, Clean Catch (01/17/2022 02:53)  Urine Culture - Urine, Urine, Clean Catch (01/17/2022 02:54)  OB Panel With HIV (01/17/2022 13:30)  POC Pregnancy, Urine (01/17/2022 15:55)      Data reviewed: Consultant notes :   Initial Prenatal with Radha Noel MD (01/17/2022)  Routine Prenatal with Radha Noel MD (01/31/2022)           Assessment and Plan      Diagnoses and all orders for this visit:    1. 10 weeks gestation of pregnancy (Primary)    2. Acute intractable headache, unspecified headache type  -     POCT SARS-CoV-2 Antigen [place]  -     COVID-19,APTIMA PANTHER([place]),[facility]NA, NP/OP SWAB IN UTM/VTM/SALINE TRANSPORT MEDIA,24 HR TAT - Swab, Nasal Cavity    3. Nausea  -     POCT SARS-CoV-2 Antigen [place]  -     COVID-19,APTIMA PANTHER([place]),[facility] LANCE/BH JANNA, NP/OP SWAB IN UTM/VTM/SALINE TRANSPORT MEDIA,24 HR TAT - Swab, Nasal Cavity  -     vitamin B-6 (PYRIDOXINE) 25 MG tablet; Take 1/2 to 1 tablet by mouth TID PRN nausea and vomiting in pregnancy  Dispense: 20 tablet; Refill: 0  -     doxylamine (Unisom SleepTabs) 25 MG tablet; Take 1/2 tablet by mouth Q6-8 hours PRN nausea and vomiting in pregnancy  Dispense: 20 tablet; Refill: 0    4. Cough  -     POCT SARS-CoV-2 Antigen [place]  -     COVID-19,APTIMA PANTHER([place]),[facility] LANCE/BH JANNA, NP/OP SWAB IN UTM/VTM/SALINE TRANSPORT MEDIA,24 HR TAT - Swab, Nasal Cavity            Follow Up     No follow-ups on file.    Patient was given instructions and counseling regarding her condition or for health maintenance advice. Please see specific information pulled into the AVS if appropriate.     Sweetie Hernandez  reports that she quit smoking about 8 months ago. Her smoking use included cigarettes and cigars. She smoked 1.00 pack per day for 0.00 years. She has never used smokeless tobacco.

## 2022-03-07 ENCOUNTER — ROUTINE PRENATAL (OUTPATIENT)
Dept: OBSTETRICS AND GYNECOLOGY | Facility: CLINIC | Age: 22
End: 2022-03-07

## 2022-03-07 VITALS — SYSTOLIC BLOOD PRESSURE: 125 MMHG | BODY MASS INDEX: 30.47 KG/M2 | DIASTOLIC BLOOD PRESSURE: 77 MMHG | WEIGHT: 172 LBS

## 2022-03-07 DIAGNOSIS — Z36.0 ENCOUNTER FOR ANTENATAL SCREENING FOR CHROMOSOMAL ANOMALIES: ICD-10-CM

## 2022-03-07 DIAGNOSIS — Z3A.12 12 WEEKS GESTATION OF PREGNANCY: Primary | ICD-10-CM

## 2022-03-07 LAB
GLUCOSE UR STRIP-MCNC: NEGATIVE MG/DL
PROT UR STRIP-MCNC: NEGATIVE MG/DL

## 2022-03-07 PROCEDURE — 99213 OFFICE O/P EST LOW 20 MIN: CPT | Performed by: OBSTETRICS & GYNECOLOGY

## 2022-03-07 NOTE — PROGRESS NOTES
No chief complaint on file.    HPI- Pt is 21 y.o.  at 12w4d here for prenatal visit.  Patient states she went to the [facility] emergency department over the weekend for chest pain that have been ongoing for several weeks and cramping.  She states that her work-up was negative.  She also complains of bilateral lower extremity cramping at night, which makes it difficult to sleep.    ROS-     - No vaginal bleeding    GI- No abdominal pain    /77   Wt 78 kg (172 lb)   LMP 2021   BMI 30.47 kg/m²   Exam - See flow sheet    Fetal heart rate is normal    Assessment-  Diagnoses and all orders for this visit:    12 weeks gestation of pregnancy  -     POC Urinalysis Dipstick    Encounter for  screening for chromosomal anomalies  -     [id number] PLUS Core - Blood,    Discussed conservative management options for leg cramps in pregnancy including hydration, compression socks, and a daily magnesium supplement.  She does desire cell free DNA testing and order was placed.  She will follow-up for next OB visit in 4 weeks.

## 2022-03-11 LAB
CFDNA.FET/CFDNA.TOTAL SFR FETUS: NORMAL %
CITATION REF LAB TEST: NORMAL
FET 13+18+21+X+Y ANEUP PLAS.CFDNA: NEGATIVE
FET CHR 21 TS PLAS.CFDNA QL: NEGATIVE
FET SEX PLAS.CFDNA DOSAGE CFDNA: NORMAL
FET TS 13 RISK PLAS.CFDNA QL: NEGATIVE
FET TS 18 RISK WBC.DNA+CFDNA QL: NEGATIVE
GA EST FROM CONCEPTION DATE: NORMAL D
GESTATIONAL AGE > 9:: YES
LAB DIRECTOR NAME PROVIDER: NORMAL
LAB DIRECTOR NAME PROVIDER: NORMAL
LABORATORY COMMENT REPORT: NORMAL
LIMITATIONS OF THE TEST: NORMAL
NEGATIVE PREDICTIVE VALUE: NORMAL
NOTE: NORMAL
PERFORMANCE CHARACTERISTICS: NORMAL
POSITIVE PREDICTIVE VALUE: NORMAL
REF LAB TEST METHOD: NORMAL
TEST PERFORMANCE INFO SPEC: NORMAL

## 2022-03-28 ENCOUNTER — ROUTINE PRENATAL (OUTPATIENT)
Dept: OBSTETRICS AND GYNECOLOGY | Facility: CLINIC | Age: 22
End: 2022-03-28

## 2022-03-28 VITALS — WEIGHT: 174.2 LBS | BODY MASS INDEX: 30.86 KG/M2 | SYSTOLIC BLOOD PRESSURE: 111 MMHG | DIASTOLIC BLOOD PRESSURE: 70 MMHG

## 2022-03-28 DIAGNOSIS — Z87.59 HISTORY OF SHOULDER DYSTOCIA IN PRIOR PREGNANCY: ICD-10-CM

## 2022-03-28 DIAGNOSIS — Z36.1 SCREENING FOR RAISED ALPHA-FETOPROTEIN LEVELS IN AMNIOTIC FLUID: ICD-10-CM

## 2022-03-28 DIAGNOSIS — D68.51 FACTOR 5 LEIDEN MUTATION, HETEROZYGOUS: Primary | ICD-10-CM

## 2022-03-28 DIAGNOSIS — Z36.89 ENCOUNTER FOR FETAL ANATOMIC SURVEY: ICD-10-CM

## 2022-03-28 DIAGNOSIS — Z3A.15 15 WEEKS GESTATION OF PREGNANCY: ICD-10-CM

## 2022-03-28 LAB
GLUCOSE UR STRIP-MCNC: NEGATIVE MG/DL
PROT UR STRIP-MCNC: NEGATIVE MG/DL

## 2022-03-28 PROCEDURE — 99213 OFFICE O/P EST LOW 20 MIN: CPT | Performed by: OBSTETRICS & GYNECOLOGY

## 2022-03-28 NOTE — PROGRESS NOTES
Chief Complaint   Patient presents with   • Routine Prenatal Visit     HPI- Pt is 21 y.o.  at 15w4d here for prenatal visit.  She has no major complaints today and has been feeling well.    ROS-     - No vaginal bleeding    GI- No abdominal pain    /70   Wt 79 kg (174 lb 3.2 oz)   LMP 2021   BMI 30.86 kg/m²   Exam - See flow sheet    Fetal heart rate is normal    Assessment-  Diagnoses and all orders for this visit:    Factor 5 Leiden mutation, heterozygous (HCC)    History of shoulder dystocia in prior pregnancy    15 weeks gestation of pregnancy    Screening for raised alpha-fetoprotein levels in amniotic fluid  -     Alpha Fetoprotein, Maternal    Encounter for fetal anatomic survey  -     US Ob 14 + Weeks Single or First Gestation; Future    Other orders  -     POC Urinalysis Dipstick    She is doing well with no major issues.  She does desire screening for neural tube defects.  She will follow-up in 4 weeks with anatomy ultrasound.

## 2022-03-29 ENCOUNTER — TELEPHONE (OUTPATIENT)
Dept: ONCOLOGY | Facility: CLINIC | Age: 22
End: 2022-03-29

## 2022-03-29 ENCOUNTER — APPOINTMENT (OUTPATIENT)
Dept: LAB | Facility: HOSPITAL | Age: 22
End: 2022-03-29

## 2022-03-29 NOTE — TELEPHONE ENCOUNTER
Caller: Sweetie Hernandez    Relationship: Self        What was the call regarding:    WANTING TO RESCHEDULE LAB /F/U TODAY       WARM TRANSFERRED TO [facility] AT  TO FURTHER ASSIST.

## 2022-03-30 ENCOUNTER — TELEPHONE (OUTPATIENT)
Dept: OBSTETRICS AND GYNECOLOGY | Facility: CLINIC | Age: 22
End: 2022-03-30

## 2022-03-30 LAB
AFP INTERP SERPL-IMP: NORMAL
AFP INTERP SERPL-IMP: NORMAL
AFP MOM SERPL: 0.82
AFP SERPL-MCNC: 24.4 NG/ML
AGE AT DELIVERY: 24 YR
GA METHOD: NORMAL
GA: 15.6 WEEKS
IDDM PATIENT QL: NORMAL
LABORATORY COMMENT REPORT: NORMAL
MULTIPLE PREGNANCY: NORMAL
NEURAL TUBE DEFECT RISK FETUS: NORMAL %
RESULT: NORMAL

## 2022-03-30 NOTE — TELEPHONE ENCOUNTER
Spoke with Sweetie. She is having some pinkish discharge and cramping. States she has been drinking enough but I encouraged her to drink extra water, no heavy lifting, wear a pad to see if the discharge will become brown, which is old blood. I asked if fever, uti symptoms and denies. States she has had nausea today and vomited x 1. Denies need for zofran. Denies sexual intercourse for a few days. I told her if she starts having bright red bleeding that fills a pad in an hour to go to [facility]. I told her if anything changes or gets worse, she can call the on call MD for advice tonight.

## 2022-03-30 NOTE — TELEPHONE ENCOUNTER
Sheldon Wang,  Pt called she is having some discharge with a little bit of blood in it and cramping. Please advise.    Thanks,   Sheri

## 2022-04-01 ENCOUNTER — HOSPITAL ENCOUNTER (EMERGENCY)
Facility: HOSPITAL | Age: 22
Discharge: HOME | End: 2022-04-01
Attending: OBSTETRICS & GYNECOLOGY | Admitting: OBSTETRICS & GYNECOLOGY

## 2022-04-01 ENCOUNTER — HOSPITAL ENCOUNTER (OUTPATIENT)
Facility: HOSPITAL | Age: 22
End: 2022-04-01
Attending: OBSTETRICS & GYNECOLOGY | Admitting: OBSTETRICS & GYNECOLOGY

## 2022-04-01 ENCOUNTER — APPOINTMENT (OUTPATIENT)
Dept: ULTRASOUND IMAGING | Facility: HOSPITAL | Age: 22
End: 2022-04-01

## 2022-04-01 VITALS
WEIGHT: 173 LBS | HEIGHT: 62 IN | OXYGEN SATURATION: 100 % | HEART RATE: 63 BPM | SYSTOLIC BLOOD PRESSURE: 109 MMHG | DIASTOLIC BLOOD PRESSURE: 69 MMHG | BODY MASS INDEX: 31.83 KG/M2 | TEMPERATURE: 98.1 F | RESPIRATION RATE: 16 BRPM

## 2022-04-01 LAB
BILIRUB UR QL STRIP: NEGATIVE
CLARITY UR: ABNORMAL
COLOR UR: YELLOW
GLUCOSE UR STRIP-MCNC: NEGATIVE MG/DL
HGB UR QL STRIP.AUTO: NEGATIVE
KETONES UR QL STRIP: NEGATIVE
LEUKOCYTE ESTERASE UR QL STRIP.AUTO: NEGATIVE
NITRITE UR QL STRIP: NEGATIVE
PH UR STRIP.AUTO: 7.5 [PH] (ref 5–8)
PROT UR QL STRIP: NEGATIVE
SP GR UR STRIP: 1.01 (ref 1–1.03)
UROBILINOGEN UR QL STRIP: ABNORMAL

## 2022-04-01 PROCEDURE — 81003 URINALYSIS AUTO W/O SCOPE: CPT | Performed by: OBSTETRICS & GYNECOLOGY

## 2022-04-01 PROCEDURE — 99282 EMERGENCY DEPT VISIT SF MDM: CPT | Performed by: OBSTETRICS & GYNECOLOGY

## 2022-04-01 PROCEDURE — 76817 TRANSVAGINAL US OBSTETRIC: CPT | Performed by: OBSTETRICS & GYNECOLOGY

## 2022-04-01 PROCEDURE — 76817 TRANSVAGINAL US OBSTETRIC: CPT

## 2022-04-01 PROCEDURE — 76815 OB US LIMITED FETUS(S): CPT

## 2022-04-01 PROCEDURE — 76815 OB US LIMITED FETUS(S): CPT | Performed by: OBSTETRICS & GYNECOLOGY

## 2022-04-01 PROCEDURE — 87086 URINE CULTURE/COLONY COUNT: CPT | Performed by: OBSTETRICS & GYNECOLOGY

## 2022-04-01 NOTE — OBED NOTES
[facility] Note OB        Patient Name: Sweetie Hernandez  YOB: 2000  MRN: [id number]  Admission Date: 2022 10:47 AM  Date of Service: 2022    Chief Complaint: Vaginal Bleeding (Pt to [facility] with c/o  brown spotting for the past 3 days when she wipes.  Pt c/o cramps for about a week.  Pt denies recent intercourse.)      Subjective     Sweetie Hernandez is a 21 y.o. female  at 16w1d with Estimated Date of Delivery: 9/15/22 who presents with Vaginal Bleeding (Pt to [facility] with c/o  brown spotting for the past 3 days when she wipes.  Pt c/o cramps for about a week.  Pt denies recent intercourse.)     She sees Radha Noel MD for her prenatal care. Her pregnancy has been complicated by: Factor V deficiency, iron deficiency anemia, and GERD.  She presents today secondary to 3-day history of seeing a small amount of brown blood when she wipes also associated with cramping over the last week.  She is seen no bright red bleeding she has had no leaking of fluid.  She has yet to feel fetal movement  She denies any dysuria, frequency, or previous hematuria.          Objective   Patient Active Problem List    Diagnosis    • Factor V deficiency (HCC) [D68.2]    • GERD (gastroesophageal reflux disease) [K21.9]    • Self-inflicted injury [[id number]]    • Iron deficiency anemia [D50.9]    • Adverse effect of iron and its compounds, sequela [T45.4X5S]    • Severe recurrent major depression without psychotic features (HCC) [F33.2]    • History of shoulder dystocia in prior pregnancy [Z87.59]    • Factor 5 Leiden mutation, heterozygous (HCC) [D68.51]    • Mood disorder ([facility]) [F39]    • Depression [F32.A]    • Anxiety [F41.9]         OB History    Para Term  AB Living   4 2 2 0 1 2   SAB IAB Ectopic Molar Multiple Live Births   1 0 0 0 0 2      # Outcome Date GA Lbr Thom/2nd Weight Sex Delivery Anes PTL Lv   4 Current            3 SAB 21 8w0d    SAB      2 Term 18 39w2d  3459 g (7 lb 10  oz) M CS-LTranv Spinal N HUBER      Birth Comments: panda OR 1       Name: ROSIE ACOSTA      Apgar1: 8  Apgar5: 9   1 Term 17 38w5d 25:00 / 03:16 3695 g (8 lb 2.3 oz) F Vag-Spont EPI N HUBER      Complications: Shoulder Dystocia      Name: GRACE VALERO      Apgar1: 5  Apgar5: 8        Past Medical History:   Diagnosis Date   • Anxiety    • Bipolar disorder (HCC)    • Depression     meds prior to pregnancy   • Factor V Leiden (HCC)    • Preeclampsia    • Seasonal allergies    • Trichimoniasis    • Urinary tract infection    • Urogenital trichomoniasis        Past Surgical History:   Procedure Laterality Date   • ADENOIDECTOMY     •  SECTION N/A 2018    Procedure:  SECTION PRIMARY;  Surgeon: Radha Noel MD;  Location: [facility] LABOR DELIVERY;  Service: Obstetrics/Gynecology   • COLONOSCOPY  approx     normal per pt    • EYE SURGERY     • TONSILLECTOMY     • UPPER GASTROINTESTINAL ENDOSCOPY  approx     reflux and scaring per pt        No current facility-administered medications on file prior to encounter.     Current Outpatient Medications on File Prior to Encounter   Medication Sig Dispense Refill   • aspirin (aspirin) 81 MG EC tablet Take 1 tablet by mouth Daily. 90 tablet 2   • busPIRone (BUSPAR) 5 MG tablet Take 5 mg by mouth 2 (Two) Times a Day.     • Prenatal Vit-Fe Fumarate-FA (prenatal vitamin 27-0.8) 27-0.8 MG tablet tablet Take  by mouth Daily.         Allergies   Allergen Reactions   • Hydrocodone Anaphylaxis   • Naproxen Swelling     Lip swelling       Family History   Problem Relation Age of Onset   • Diabetes Mother    • Hypertension Mother    • Heart disease Mother    • Thyroid disease Sister    • Ovarian cancer Sister    • Heart disease Maternal Uncle    • Heart disease Maternal Grandmother    • Heart disease Maternal Grandfather    • Hypertension Maternal Grandfather    • Cancer Maternal Grandfather    • Colon cancer Cousin        Social History  "    Socioeconomic History   • Marital status:      Spouse name: Meliton   • Years of education: High school   Tobacco Use   • Smoking status: Former Smoker     Packs/day: 1.00     Years: 0.00     Pack years: 0.00     Types: Cigarettes     Quit date: 2021     Years since quittin.8   • Smokeless tobacco: Never Used   • Tobacco comment: evapes   Vaping Use   • Vaping Use: Every day   • Substances: Nicotine, Flavoring   Substance and Sexual Activity   • Alcohol use: Yes     Alcohol/week: 3.0 standard drinks     Types: 3 Glasses of wine per week   • Drug use: No   • Sexual activity: Yes     Partners: Male     Birth control/protection: None           Review of Systems   Constitutional: Negative for chills, fatigue and fever.   HENT: Negative.    Eyes: Negative for photophobia and visual disturbance.   Respiratory: Negative for cough, chest tightness and shortness of breath.    Cardiovascular: Negative for chest pain and leg swelling.   Gastrointestinal: Negative for abdominal pain, diarrhea, nausea and vomiting.   Genitourinary: Positive for pelvic pain ( Pelvic cramping for a week) and vaginal bleeding ( Vaginal spotting). Negative for dysuria, flank pain, hematuria and vaginal discharge.   Musculoskeletal: Negative for back pain.   Neurological: Negative for dizziness, seizures, weakness and headaches.          PHYSICAL EXAM:      VITAL SIGNS:  Vitals:    22 1137   Resp: 16   Temp: 98.1 °F (36.7 °C)   TempSrc: Oral   Weight: 78.5 kg (173 lb)   Height: 158.1 cm (62.25\")        Fetal HR (beats/min): 120.        PHYSICAL EXAM:    General: well developed; well nourished  no acute distress   Heart: Not performed.   Lungs  Back: breathing is unlabored  CVA tenderness is absent   Abdomen: soft, non-tender; no masses  no umbilical or inguinal hernias are present  no hepato-splenomegaly       Cervix:  Exam deferred to transvaginal ultrasound for cervical length and evaluation of pregnancy      Contractions: " Too early to detect contractions        Extremities: peripheral pulses normal, no pedal edema, no clubbing or cyanosis      LABS AND TESTING ORDERED:  1. Doppler fetal heart tones  2. Urinalysis  3. Ultrasound for evaluation of cervical length    LAB RESULTS:    Recent Results (from the past 24 hour(s))   Urinalysis With Culture If Indicated - Urine, Clean Catch    Collection Time: 22 11:46 AM    Specimen: Urine, Clean Catch   Result Value Ref Range    Color, UA Yellow Yellow, Straw    Appearance, UA Turbid (A) Clear    pH, UA 7.5 5.0 - 8.0    Specific Gravity, UA 1.014 1.005 - 1.030    Glucose, UA Negative Negative    Ketones, UA Negative Negative    Bilirubin, UA Negative Negative    Blood, UA Negative Negative    Protein, UA Negative Negative    Leuk Esterase, UA Negative Negative    Nitrite, UA Negative Negative    Urobilinogen, UA 0.2 E.U./dL 0.2 - 1.0 E.U./dL       Lab Results   Component Value Date    ABO O 2022    RH Positive 2022       Lab Results   Component Value Date    STREPGPB Negative 2018           Assessment/Plan    ASSESSMENT/PLAN:  Sweetie Hernandez is a 21 y.o. female  at 16w1d who presented with cramping for one week and spotting for the last 3 days.  Blood type is O Positive Rhogam not indicated.  US obtained for viability, cervical length, and placental location.   US demonstrates a viable fetus with fetal heart rate of 144 BPM, cervical length of greater than 4 cm, too early to evaluate placental location but no placenta over cervical os.  In view of bleeding and cramping diagnosis is Threatened Ab, but overall the US is reassuring.       Final Impression:  • Pregnancy at 16w1d    • Fetal heart rate noted to be 144 Per US, Reassuring fetal status  • Findings c/w threatened  in view of cramping and spotting.     • Maternal vital signs were reviewed and were unremarkable                   Vitals:    22 1137   Resp: 16   Temp: 98.1 °F (36.7 °C)  "  TempSrc: Oral   Weight: 78.5 kg (173 lb)   Height: 158.1 cm (62.25\")       • Blood type O Positive Rhogam not indicated  • She will be discharged to home     PLAN:  • Discharge to home  • She will continue her home meds       Your medication list      CONTINUE taking these medications      Instructions Last Dose Given Next Dose Due   aspirin 81 MG EC tablet      Take 1 tablet by mouth Daily.       busPIRone 5 MG tablet  Commonly known as: BUSPAR      Take 5 mg by mouth 2 (Two) Times a Day.       prenatal vitamin 27-0.8 27-0.8 MG tablet tablet      Take  by mouth Daily.                1. She will follow up with Radha Noel MD as scheduled and as needed  2. She has been instructed to return for contractions, vaginal bleeding, Rupture of membranes, decreased fetal movement or other concern  3. She may call the office or [facility] with questions.           I have spent 40 minutes including face to face time with the patient, greater than 50% in discussion of the diagnosis (counseling) and/or coordination of care.         Fran Villar MD  4/1/2022  12:51 EDT  OB Hospitalist  Phone:  [phone]  "

## 2022-04-02 LAB — BACTERIA SPEC AEROBE CULT: NO GROWTH

## 2022-04-15 ENCOUNTER — TELEPHONE (OUTPATIENT)
Dept: OBSTETRICS AND GYNECOLOGY | Facility: CLINIC | Age: 22
End: 2022-04-15

## 2022-04-15 NOTE — TELEPHONE ENCOUNTER
Spoke with Sweetie about her lower back and hip pain. Pain at 2 and having trouble walking, especially at work. She works at [employer]. I suggested she can take tylenol, not to exceed 4000 mg in 24 hours, heating pad to lower back for about 20 minutes and reapply, lay down with pillow between her legs, a warm(not hot) bath, drink plenty of water and eat protein.   I explained the round ligament pain and that cramping and pain are normal as her baby grows and belly expands. While she is working, she gets hot sometimes and feels like she could pass out. I told her she must stay hydrated. I told her I would ask Dr Noel if she has further advice, but I think we have covered most everything. I also told her that later in her pregnancy her feet and ankles will swell and she may need support/compression socks to wear.

## 2022-04-15 NOTE — TELEPHONE ENCOUNTER
Sheldon Wang,  This is a  pt she states over the last week has had lower back and hip pain that has got progressively worse. She said on a pain scale her pain is a 4 and is now having trouble walking from the pain. Can you advise? I told her you would give her a call or you would send to Dr. Noel to advise. Thank you!

## 2022-04-24 ENCOUNTER — HOSPITAL ENCOUNTER (EMERGENCY)
Facility: HOSPITAL | Age: 22
Discharge: HOME | End: 2022-04-24
Attending: OBSTETRICS & GYNECOLOGY | Admitting: OBSTETRICS & GYNECOLOGY

## 2022-04-24 VITALS
HEIGHT: 63 IN | SYSTOLIC BLOOD PRESSURE: 117 MMHG | BODY MASS INDEX: 30.65 KG/M2 | TEMPERATURE: 98.4 F | DIASTOLIC BLOOD PRESSURE: 61 MMHG | HEART RATE: 81 BPM | RESPIRATION RATE: 18 BRPM

## 2022-04-24 PROCEDURE — 99281 EMR DPT VST MAYX REQ PHY/QHP: CPT | Performed by: OBSTETRICS & GYNECOLOGY

## 2022-04-24 RX ORDER — HYDROXYZINE HYDROCHLORIDE 25 MG/1
25 TABLET, FILM COATED ORAL EVERY 6 HOURS PRN
Qty: 30 TABLET | Refills: 2 | Status: SHIPPED | OUTPATIENT
Start: 2022-04-24 | End: 2023-03-16

## 2022-04-25 ENCOUNTER — ROUTINE PRENATAL (OUTPATIENT)
Dept: OBSTETRICS AND GYNECOLOGY | Facility: CLINIC | Age: 22
End: 2022-04-25

## 2022-04-25 DIAGNOSIS — D68.51 FACTOR 5 LEIDEN MUTATION, HETEROZYGOUS: Primary | ICD-10-CM

## 2022-04-25 DIAGNOSIS — F32.A DEPRESSION DURING PREGNANCY IN SECOND TRIMESTER: ICD-10-CM

## 2022-04-25 DIAGNOSIS — Z30.2 REQUEST FOR STERILIZATION: ICD-10-CM

## 2022-04-25 DIAGNOSIS — Z98.891 PREVIOUS CESAREAN SECTION: ICD-10-CM

## 2022-04-25 DIAGNOSIS — F41.9 ANXIETY: ICD-10-CM

## 2022-04-25 DIAGNOSIS — O99.342 DEPRESSION DURING PREGNANCY IN SECOND TRIMESTER: ICD-10-CM

## 2022-04-25 DIAGNOSIS — D50.9 IRON DEFICIENCY ANEMIA, UNSPECIFIED IRON DEFICIENCY ANEMIA TYPE: ICD-10-CM

## 2022-04-25 DIAGNOSIS — Z3A.19 19 WEEKS GESTATION OF PREGNANCY: ICD-10-CM

## 2022-04-25 LAB
GLUCOSE UR STRIP-MCNC: NEGATIVE MG/DL
PROT UR STRIP-MCNC: NEGATIVE MG/DL

## 2022-04-25 PROCEDURE — 99214 OFFICE O/P EST MOD 30 MIN: CPT | Performed by: OBSTETRICS & GYNECOLOGY

## 2022-04-25 NOTE — PROGRESS NOTES
Chief Complaint   Patient presents with   • Routine Prenatal Visit     HPI- Pt is 21 y.o.  at 19w4d here for prenatal visit.  She complains of lower back pain after working and standing on her feet.  She was also seen in OB ED yesterday for worsening anxiety and depression.    ROS-     - No vaginal bleeding    GI- No abdominal pain    /66   Wt 80.2 kg (176 lb 12.8 oz)   LMP 2021   BMI 31.32 kg/m²   Exam - See flow sheet    Fetal heart rate is normal    Assessment-  Diagnoses and all orders for this visit:    Factor 5 Leiden mutation, heterozygous (HCC)    Request for sterilization    Previous  section    Depression during pregnancy in second trimester    Anxiety    19 weeks gestation of pregnancy    Other orders  -     POC Urinalysis Dipstick    Recommend a belly support band while working to help with lower back pain.  Patient is currently increasing her BuSpar to 20 mg/day.  She also has hydroxyzine as needed for panic attacks.  She does follow with a psychiatrist and she was advised to follow-up soon with her psychiatrist to let them know that she is making adjustments in her medication and to see if they have any other recommendations.  Anatomy ultrasound was performed today and shows normal-appearing anatomy.  Ultrasound was reviewed with her.  She does desire sterilization with her repeat  and discussed risk of regret at a young age.  She will sign tubal consent forms today.  She will follow-up for next visit in 4 weeks.

## 2022-04-26 ENCOUNTER — OFFICE VISIT (OUTPATIENT)
Dept: FAMILY MEDICINE CLINIC | Facility: CLINIC | Age: 22
End: 2022-04-26

## 2022-04-26 VITALS
WEIGHT: 178 LBS | DIASTOLIC BLOOD PRESSURE: 64 MMHG | OXYGEN SATURATION: 97 % | HEART RATE: 76 BPM | SYSTOLIC BLOOD PRESSURE: 122 MMHG | BODY MASS INDEX: 32.76 KG/M2 | TEMPERATURE: 97 F | HEIGHT: 62 IN

## 2022-04-26 DIAGNOSIS — J01.90 ACUTE NON-RECURRENT SINUSITIS, UNSPECIFIED LOCATION: ICD-10-CM

## 2022-04-26 DIAGNOSIS — R09.81 NASAL SINUS CONGESTION: ICD-10-CM

## 2022-04-26 DIAGNOSIS — R05.9 COUGH: ICD-10-CM

## 2022-04-26 DIAGNOSIS — F41.1 GENERALIZED ANXIETY DISORDER: ICD-10-CM

## 2022-04-26 DIAGNOSIS — Z3A.19 19 WEEKS GESTATION OF PREGNANCY: Primary | ICD-10-CM

## 2022-04-26 PROCEDURE — 99214 OFFICE O/P EST MOD 30 MIN: CPT | Performed by: NURSE PRACTITIONER

## 2022-04-26 RX ORDER — AMOXICILLIN 875 MG/1
875 TABLET, COATED ORAL 2 TIMES DAILY
Qty: 20 TABLET | Refills: 0 | Status: SHIPPED | OUTPATIENT
Start: 2022-04-26 | End: 2023-03-16

## 2022-04-26 RX ORDER — LORATADINE 10 MG/1
10 TABLET ORAL DAILY
Qty: 90 TABLET | Refills: 0 | Status: SHIPPED | OUTPATIENT
Start: 2022-04-26 | End: 2023-03-16

## 2022-04-26 RX ORDER — FLUTICASONE PROPIONATE 50 MCG
2 SPRAY, SUSPENSION (ML) NASAL DAILY
Qty: 16 G | Refills: 0 | Status: SHIPPED | OUTPATIENT
Start: 2022-04-26 | End: 2023-03-16

## 2022-04-26 NOTE — PROGRESS NOTES
Chief Complaint  Cough (Coughing and some trouble breathing, going on for about 2 or 3 weeks now, but she also vapes, so she is not sure if that has anything to do with it. )    Subjective            Sweetie Hernandez presents to [facility]  History of Present Illness     She is here today with c/o cough and shortness of breath - states that he cough has been been present for the past 1 month. She isn't coughing anything up. It is a dry cough. She has not been seen for the cough elsewhere or treated. In addition to the cough, she endorses sinus pressure and nasal congestion. She denies any significant post-nasal drip. She is currently taking OTC Claritin-d PRN, but not routinely. No fever, chills, or body aches. Symptoms have been ongoing since she was seen in February.     She is also having shortness of breath, but states that it isn't related to the cough. She feels that the shortness of breath is r/t panic attacks. The shortness of breath is at night, usually when laying down. She is only having panic attacks at night. She isn't sure what is triggering them. She does vape, but she has been vaping for a while now and she has never experienced panic attacks before. She is vaping with disposables - they have 2000 puffs and that will last her 7-10 days. She is not interested in quitting with patches d/t poor mental health outcomes previously when trying to quit.     She has a psychiatric hx (+) for borderline personality d/o, anxiety, and depression - remote hx of bipolar d/o when she was pregnant with her son. She has an appointment on May 9 to get re-evaluated. She was referred to this place by Cait. She is going to them for medication management. She is currently prescribed Buspar by Cait - she was on 5mg BID, but she cut back to once daily when she got pregnant. She was in the ER over the weekend with her symptoms and they told her to up the Buspar to 10mg BID. They also added  hydroxyzine. She has an appointment with Cait in 3 weeks. She made that appointment after being seen in the ER. She is not currently having HI/SI. She denies any MARYELLEN.       Past Medical History:   Diagnosis Date   • Anxiety    • Bipolar disorder (HCC)    • Depression     meds prior to pregnancy   • Factor V Leiden (HCC)    • Preeclampsia    • Seasonal allergies    • Trichimoniasis    • Urinary tract infection    • Urogenital trichomoniasis        Allergies   Allergen Reactions   • Hydrocodone Anaphylaxis   • Naproxen Swelling     Lip swelling        Past Surgical History:   Procedure Laterality Date   • ADENOIDECTOMY     •  SECTION N/A 2018    Procedure:  SECTION PRIMARY;  Surgeon: Radha Noel MD;  Location: [facility] LABOR DELIVERY;  Service: Obstetrics/Gynecology   • COLONOSCOPY  approx     normal per pt    • EYE SURGERY     • TONSILLECTOMY     • UPPER GASTROINTESTINAL ENDOSCOPY  approx     reflux and scaring per pt         Social History     Tobacco Use   • Smoking status: Former Smoker     Packs/day: 1.00     Years: 0.00     Pack years: 0.00     Types: Cigarettes     Quit date: 2021     Years since quittin.8   • Smokeless tobacco: Never Used   • Tobacco comment: evapes   Vaping Use   • Vaping Use: Every day   • Substances: Nicotine, Flavoring   • Devices: Disposable   Substance Use Topics   • Alcohol use: Yes     Alcohol/week: 3.0 standard drinks     Types: 3 Glasses of wine per week   • Drug use: No       Family History   Problem Relation Age of Onset   • Diabetes Mother    • Hypertension Mother    • Heart disease Mother    • Thyroid disease Sister    • Ovarian cancer Sister    • Heart disease Maternal Uncle    • Heart disease Maternal Grandmother    • Heart disease Maternal Grandfather    • Hypertension Maternal Grandfather    • Cancer Maternal Grandfather    • Colon cancer Cousin         Health Maintenance Due   Topic Date Due   • ANNUAL PHYSICAL  Never done   • HPV  "VACCINES (1 - 2-dose series) Never done   • PAP SMEAR  Never done   • COVID-19 Vaccine (3 - Booster) 01/09/2022        Current Outpatient Medications on File Prior to Visit   Medication Sig   • aspirin (aspirin) 81 MG EC tablet Take 1 tablet by mouth Daily.   • busPIRone (BUSPAR) 5 MG tablet Take 5 mg by mouth 2 (Two) Times a Day.   • hydrOXYzine (ATARAX) 25 MG tablet Take 1 tablet by mouth Every 6 (Six) Hours As Needed for Anxiety.   • Prenatal Vit-Fe Fumarate-FA (prenatal vitamin 27-0.8) 27-0.8 MG tablet tablet Take  by mouth Daily.     No current facility-administered medications on file prior to visit.       Immunization History   Administered Date(s) Administered   • COVID-19 (MODERNA) 1st, 2nd, 3rd Dose Only 07/28/2021, 08/09/2021   • Flu Vaccine Split Quad 10/27/2016   • Influenza, Unspecified 10/27/2016, 10/26/2018   • Tdap 08/01/2009, 05/30/2017, 07/12/2018       Review of Systems     Objective     /64 (BP Location: Right arm, Patient Position: Sitting, Cuff Size: Adult)   Pulse 76   Temp 97 °F (36.1 °C) (Temporal)   Ht 158.1 cm (62.25\")   Wt 80.7 kg (178 lb)   SpO2 97%   BMI 32.30 kg/m²       Physical Exam  Vitals reviewed.   Constitutional:       General: She is not in acute distress.     Appearance: Normal appearance. She is well-developed.   HENT:      Head: Normocephalic and atraumatic.      Right Ear: Tympanic membrane, ear canal and external ear normal. There is no impacted cerumen.      Left Ear: Tympanic membrane, ear canal and external ear normal. There is no impacted cerumen.      Nose: Congestion present. No rhinorrhea.      Mouth/Throat:      Mouth: Mucous membranes are moist.      Pharynx: Oropharynx is clear. No oropharyngeal exudate or posterior oropharyngeal erythema.   Eyes:      General: No scleral icterus.     Extraocular Movements: Extraocular movements intact.      Conjunctiva/sclera: Conjunctivae normal.   Neck:      Trachea: Trachea normal.   Cardiovascular:      Rate and " Rhythm: Normal rate and regular rhythm.      Pulses: Normal pulses.      Heart sounds: No murmur heard.  Pulmonary:      Effort: Pulmonary effort is normal. No respiratory distress.      Breath sounds: Normal breath sounds. No wheezing, rhonchi or rales.      Comments: No cough witnessed on exam today.  Musculoskeletal:         General: Normal range of motion.      Cervical back: Normal range of motion and neck supple.      Right lower leg: No edema.      Left lower leg: No edema.   Lymphadenopathy:      Cervical: No cervical adenopathy.   Skin:     General: Skin is warm and dry.   Neurological:      Mental Status: She is alert and oriented to person, place, and time.   Psychiatric:         Mood and Affect: Mood and affect normal.         Behavior: Behavior normal.         Thought Content: Thought content normal.         Judgment: Judgment normal.         Result Review :     The following data was reviewed by: LINDA Barragan on 04/26/2022:    Common labs    Common Labsle 8/20/21 8/20/21 11/29/21 1/17/22    1041 1041     Glucose  80     BUN  11     Creatinine  0.75     eGFR Non African Am  96     Sodium  141     Potassium  4.2     Chloride  106     Calcium  10.1     Albumin  4.70     Total Bilirubin  0.6     Alkaline Phosphatase  63     AST (SGOT)  17     ALT (SGPT)  21     WBC 6.15  7.64 9.0   Hemoglobin 14.6  13.9 14.4   Hematocrit 43.9  41.6 42.2   Platelets 205  199 240           POCT SARS-CoV-2 Antigen [place] (02/23/2022 13:16)  COVID-19,APTIMA PANTHER(NATO), LANCE/ JANNA, NP/OP SWAB IN UTM/VTM/SALINE TRANSPORT MEDIA,24 HR TAT - Swab, Nasal Cavity (02/23/2022 15:40)       Office Visit with Adelina Ortiz APRN (02/23/2022)  Admission (Discharged) with Radha Noel MD (04/24/2022)           Assessment and Plan      Diagnoses and all orders for this visit:    1. GA: 19w5d (Primary)    2. Acute non-recurrent sinusitis, unspecified location  -     amoxicillin (AMOXIL) 875 MG tablet; Take 1 tablet  by mouth 2 (Two) Times a Day.  Dispense: 20 tablet; Refill: 0    3. Nasal sinus congestion  -     loratadine (Claritin) 10 MG tablet; Take 1 tablet by mouth Daily.  Dispense: 90 tablet; Refill: 0  -     fluticasone (Flonase) 50 MCG/ACT nasal spray; 2 sprays into the nostril(s) as directed by provider Daily.  Dispense: 16 g; Refill: 0    4. Cough  Comments:  Encouraged cessation of vaping. Unable to perform CXR d/t pregnancy - will reassess if not improved with treatment - consider pulmonary vs. allergy referral    5. Generalized anxiety disorder  Comments:  Continue Buspar and hydroxyzine - follow up with [facility] as scheduled.             Follow Up     Return in about 2 weeks (around 5/10/2022) if symptoms worsen or fail to improve.    Patient was given instructions and counseling regarding her condition or for health maintenance advice. Please see specific information pulled into the AVS if appropriate.

## 2022-06-06 ENCOUNTER — OFFICE VISIT (OUTPATIENT)
Dept: FAMILY MEDICINE CLINIC | Facility: CLINIC | Age: 22
End: 2022-06-06

## 2022-06-06 VITALS
SYSTOLIC BLOOD PRESSURE: 102 MMHG | OXYGEN SATURATION: 99 % | BODY MASS INDEX: 32.43 KG/M2 | HEART RATE: 85 BPM | TEMPERATURE: 98 F | WEIGHT: 183 LBS | DIASTOLIC BLOOD PRESSURE: 85 MMHG | HEIGHT: 63 IN

## 2022-06-06 DIAGNOSIS — R09.89 SINUS SYMPTOM: Primary | ICD-10-CM

## 2022-06-06 PROCEDURE — 99213 OFFICE O/P EST LOW 20 MIN: CPT | Performed by: NURSE PRACTITIONER

## 2022-06-06 NOTE — PROGRESS NOTES
Chief Complaint  Sinusitis (Sinus pressure and headache x one week )    Subjective          Sweetie Hernandez presents to [facility]    Patient presents with possible sinus infection.  She reports symptoms have been present for approximately 1 week.    She has experienced a frontal headache with discomfort radiating to her jaws.  Recently she has experienced a slight sore throat.  There has been no fever, no drainage, no nausea vomiting or diarrhea.  She does report that there is some facial pressure and ear discomfort.    She has tried Tylenol with minimal improvement.  She is 25 weeks pregnant with her third pregnancy.  There have been no complications with this pregnancy.           Past Medical History:   Diagnosis Date   • Anxiety    • Bipolar disorder ([facility])    • Depression     meds prior to pregnancy   • Factor V Leiden ([facility])    • Preeclampsia    • Seasonal allergies    • Trichimoniasis    • Urinary tract infection    • Urogenital trichomoniasis        Allergies   Allergen Reactions   • Hydrocodone Anaphylaxis   • Naproxen Swelling     Lip swelling        Past Surgical History:   Procedure Laterality Date   • ADENOIDECTOMY     •  SECTION N/A 2018    Procedure:  SECTION PRIMARY;  Surgeon: Radha Noel MD;  Location: [facility] LABOR DELIVERY;  Service: Obstetrics/Gynecology   • COLONOSCOPY  approx     normal per pt    • EYE SURGERY     • TONSILLECTOMY     • UPPER GASTROINTESTINAL ENDOSCOPY  approx     reflux and scaring per pt         Social History     Socioeconomic History   • Marital status:      Spouse name: Meliton   • Years of education: High school   Tobacco Use   • Smoking status: Former Smoker     Packs/day: 1.00     Years: 0.00     Pack years: 0.00     Types: Cigarettes     Quit date: 2021     Years since quittin.9   • Smokeless tobacco: Never Used   • Tobacco comment: evapes   Vaping Use   • Vaping Use: Every day   •  Substances: Nicotine, Flavoring   • Devices: Disposable   Substance and Sexual Activity   • Alcohol use: Yes     Alcohol/week: 3.0 standard drinks     Types: 3 Glasses of wine per week   • Drug use: No   • Sexual activity: Yes     Partners: Male     Birth control/protection: None       Family History   Problem Relation Age of Onset   • Diabetes Mother    • Hypertension Mother    • Heart disease Mother    • Thyroid disease Sister    • Ovarian cancer Sister    • Heart disease Maternal Uncle    • Heart disease Maternal Grandmother    • Heart disease Maternal Grandfather    • Hypertension Maternal Grandfather    • Cancer Maternal Grandfather    • Colon cancer Cousin         Health Maintenance Due   Topic Date Due   • ANNUAL PHYSICAL  Never done   • HPV VACCINES (1 - 2-dose series) Never done   • PAP SMEAR  Never done   • COVID-19 Vaccine (3 - Booster) 01/09/2022        Last Completed Pap Smear     This patient has no relevant Health Maintenance data.          Last Completed Mammogram     This patient has no relevant Health Maintenance data.          Last Completed Colonoscopy     This patient has no relevant Health Maintenance data.          Current Outpatient Medications on File Prior to Visit   Medication Sig   • amoxicillin (AMOXIL) 875 MG tablet Take 1 tablet by mouth 2 (Two) Times a Day.   • aspirin (aspirin) 81 MG EC tablet Take 1 tablet by mouth Daily.   • busPIRone (BUSPAR) 5 MG tablet Take 5 mg by mouth 2 (Two) Times a Day.   • fluticasone (Flonase) 50 MCG/ACT nasal spray 2 sprays into the nostril(s) as directed by provider Daily.   • hydrOXYzine (ATARAX) 25 MG tablet Take 1 tablet by mouth Every 6 (Six) Hours As Needed for Anxiety.   • loratadine (Claritin) 10 MG tablet Take 1 tablet by mouth Daily.   • Prenatal Vit-Fe Fumarate-FA (prenatal vitamin 27-0.8) 27-0.8 MG tablet tablet Take  by mouth Daily.     No current facility-administered medications on file prior to visit.       Immunization History  "  Administered Date(s) Administered   • COVID-19 (MODERNA) 1st, 2nd, 3rd Dose Only 07/28/2021, 08/09/2021   • Flu Vaccine Split Quad 10/27/2016   • Influenza, Unspecified 10/27/2016, 10/26/2018   • Tdap 08/01/2009, 05/30/2017, 07/12/2018       Review of Systems     Objective     /85   Pulse 85   Temp 98 °F (36.7 °C)   Ht 160 cm (63\")   Wt 83 kg (183 lb)   SpO2 99%   BMI 32.42 kg/m²         Physical Exam  Vitals reviewed.   HENT:      Head: Normocephalic.      Comments: No tenderness to sinus areas, frontal or maxillary.     Right Ear: Tympanic membrane normal.      Left Ear: Tympanic membrane normal.      Nose: Nose normal.      Mouth/Throat:      Mouth: Mucous membranes are moist.      Pharynx: Oropharynx is clear. No posterior oropharyngeal erythema.   Cardiovascular:      Rate and Rhythm: Normal rate and regular rhythm.      Heart sounds: Normal heart sounds.   Pulmonary:      Effort: Pulmonary effort is normal.      Breath sounds: Normal breath sounds.   Musculoskeletal:      Cervical back: Normal range of motion.   Skin:     General: Skin is warm and dry.   Psychiatric:         Mood and Affect: Mood normal.           Result Review :                           Assessment and Plan        Diagnoses and all orders for this visit:    1. Sinus symptom (Primary)              Follow Up     No follow-ups on file.  It does not appear that antibiotics are indicated at this time.  Suggested that she use a sinus/nasal rinse.  Also to continue Tylenol as needed for discomfort.  If symptoms worsen or are suggestive of infection, suggest that she contact the office to be considered for antibiotic use.    Patient was given instructions and counseling regarding her condition or for health maintenance advice. Please see specific information pulled into the AVS if appropriate.       "

## 2022-06-16 VITALS — DIASTOLIC BLOOD PRESSURE: 66 MMHG | WEIGHT: 176.8 LBS | SYSTOLIC BLOOD PRESSURE: 118 MMHG | BODY MASS INDEX: 31.32 KG/M2

## 2022-06-28 ENCOUNTER — OFFICE VISIT (OUTPATIENT)
Dept: FAMILY MEDICINE CLINIC | Facility: CLINIC | Age: 22
End: 2022-06-28

## 2022-06-28 VITALS
TEMPERATURE: 97.1 F | DIASTOLIC BLOOD PRESSURE: 70 MMHG | OXYGEN SATURATION: 98 % | HEART RATE: 96 BPM | BODY MASS INDEX: 32.42 KG/M2 | WEIGHT: 183 LBS | SYSTOLIC BLOOD PRESSURE: 110 MMHG

## 2022-06-28 DIAGNOSIS — J02.9 ACUTE PHARYNGITIS, UNSPECIFIED ETIOLOGY: ICD-10-CM

## 2022-06-28 DIAGNOSIS — B34.9 VIRAL ILLNESS: Primary | ICD-10-CM

## 2022-06-28 LAB
EXPIRATION DATE: NORMAL
INTERNAL CONTROL: NORMAL
Lab: NORMAL
S PYO AG THROAT QL: NEGATIVE

## 2022-06-28 PROCEDURE — 87880 STREP A ASSAY W/OPTIC: CPT | Performed by: FAMILY MEDICINE

## 2022-06-28 PROCEDURE — 99213 OFFICE O/P EST LOW 20 MIN: CPT | Performed by: FAMILY MEDICINE

## 2022-06-28 NOTE — PROGRESS NOTES
Chief Complaint    Sore Throat (Congestion, started yesterday )    Subjective      Sweetie Hernandez presents to [facility]    History of Present Illness    1.) ACUTE RESPIRATORY ILLNESS : Onset - 6.27.22. Per patient - sore throat + congestion. No c/o fevers or chills. No c/o loss of taste or smell.     Objective     Vital Signs:     /70   Pulse 96   Temp 97.1 °F (36.2 °C)   Wt 83 kg (183 lb)   SpO2 98%   BMI 32.42 kg/m²       Physical Exam  Vitals reviewed.   Constitutional:       General: She is not in acute distress.     Appearance: Normal appearance. She is well-developed.   HENT:      Head: Normocephalic and atraumatic.      Right Ear: Hearing and external ear normal.      Left Ear: Hearing and external ear normal.      Nose: Nose normal.   Eyes:      General: Lids are normal.         Right eye: No discharge.         Left eye: No discharge.      Conjunctiva/sclera: Conjunctivae normal.   Pulmonary:      Effort: Pulmonary effort is normal.   Abdominal:      General: There is no distension.   Musculoskeletal:         General: No swelling.      Cervical back: Neck supple.   Skin:     Coloration: Skin is not jaundiced.      Findings: No erythema.   Neurological:      Mental Status: She is alert. Mental status is at baseline.   Psychiatric:         Mood and Affect: Mood and affect normal.         Thought Content: Thought content normal.     Assessment and Plan     Diagnoses and all orders for this visit:    1. Viral illness (Primary)  Comments:  Recommend supportive care. If any fever or chills, contact offce. Do not use NSAID for fevers. Adequate fluids + rest.    2. Acute pharyngitis, unspecified etiology  Comments:  Neg rapid strep.  Orders:  -     POC Rapid Strep A    Follow Up     Return if symptoms worsen or fail to improve.    Patient was given instructions and counseling regarding her condition or for health maintenance advice. Please see specific information pulled  into the AVS if appropriate.

## 2022-07-05 ENCOUNTER — DOCUMENTATION (OUTPATIENT)
Dept: PHYSICAL THERAPY | Facility: CLINIC | Age: 22
End: 2022-07-05

## 2022-07-05 NOTE — PROGRESS NOTES
Discharge Summary  Discharge Summary from Physical Therapy Report      Dates  PT visit: 12/3/21-12/7/21  Number of Visits: 2     Discharge Status of Patient: See note dated 12/7/21    Goals: Partially Met    Discharge Plan: Continue with current home exercise program as instructed    Date of Discharge 7/5/22        Stu Davis, PT  Physical Therapist

## 2022-09-20 ENCOUNTER — TELEPHONE (OUTPATIENT)
Dept: ONCOLOGY | Facility: CLINIC | Age: 22
End: 2022-09-20

## 2022-09-20 NOTE — TELEPHONE ENCOUNTER
Caller: Sweetie Hernandez A    Relationship to patient: Self    Best call back number: [phone]    Chief complaint: PATIENT WANTED TO RESCHEDULE AN APPOINTMENT SHE MISSED, HUB UNABLE TO RESCHEDULE FOR ACTIVE TREATMENT PATIENT    Type of visit: LAB AND FOLLOW UP    Requested date: NEXT AVAILABLE      If rescheduling, when is the original appointment: 4-25-22    Additional notes:PLEASE CALL PATIENT TO RECSHEDULE

## 2022-10-06 ENCOUNTER — TELEPHONE (OUTPATIENT)
Dept: ONCOLOGY | Facility: CLINIC | Age: 22
End: 2022-10-06

## 2022-10-06 NOTE — TELEPHONE ENCOUNTER
Caller: JACQUES ACOSTA    Relationship to patient: SELF    Best call back number: [phone]    Patient is needing:TO R/S LAB AND F/U APPT TO AROUND MID-MORNING AROUND 10 AM-11 AM.

## 2022-10-10 ENCOUNTER — TELEPHONE (OUTPATIENT)
Dept: ONCOLOGY | Facility: CLINIC | Age: 22
End: 2022-10-10

## 2022-10-10 NOTE — TELEPHONE ENCOUNTER
Caller: Sweetie Hernandez    Relationship to patient: Self    Best call back number: [phone]      Chief complaint: CONFLICT OF SCHEDULE     Type of visit: LAB AND FOLLOW UP     Requested date: 10/27 AROUND 1-2PM     If rescheduling, when is the original appointment: 10/17    Additional notes:

## 2022-10-11 ENCOUNTER — APPOINTMENT (OUTPATIENT)
Dept: LAB | Facility: HOSPITAL | Age: 22
End: 2022-10-11

## 2022-10-17 ENCOUNTER — APPOINTMENT (OUTPATIENT)
Dept: LAB | Facility: HOSPITAL | Age: 22
End: 2022-10-17

## 2023-03-16 ENCOUNTER — OFFICE VISIT (OUTPATIENT)
Dept: FAMILY MEDICINE CLINIC | Facility: CLINIC | Age: 23
End: 2023-03-16
Payer: MEDICAID

## 2023-03-16 VITALS
TEMPERATURE: 97.2 F | SYSTOLIC BLOOD PRESSURE: 120 MMHG | BODY MASS INDEX: 30.48 KG/M2 | HEIGHT: 63 IN | OXYGEN SATURATION: 99 % | WEIGHT: 172 LBS | HEART RATE: 92 BPM | DIASTOLIC BLOOD PRESSURE: 80 MMHG

## 2023-03-16 DIAGNOSIS — Z11.8 SCREENING FOR CHLAMYDIAL DISEASE: ICD-10-CM

## 2023-03-16 DIAGNOSIS — Z71.6 ENCOUNTER FOR SMOKING CESSATION COUNSELING: ICD-10-CM

## 2023-03-16 DIAGNOSIS — Z00.00 ANNUAL PHYSICAL EXAM: Primary | ICD-10-CM

## 2023-03-16 DIAGNOSIS — Z23 NEED FOR INFLUENZA VACCINATION: ICD-10-CM

## 2023-03-16 DIAGNOSIS — F17.218 CIGARETTE NICOTINE DEPENDENCE WITH OTHER NICOTINE-INDUCED DISORDER: ICD-10-CM

## 2023-03-16 DIAGNOSIS — E66.9 CLASS 1 OBESITY WITHOUT SERIOUS COMORBIDITY WITH BODY MASS INDEX (BMI) OF 30.0 TO 30.9 IN ADULT, UNSPECIFIED OBESITY TYPE: ICD-10-CM

## 2023-03-16 PROCEDURE — 90686 IIV4 VACC NO PRSV 0.5 ML IM: CPT | Performed by: NURSE PRACTITIONER

## 2023-03-16 PROCEDURE — 1160F RVW MEDS BY RX/DR IN RCRD: CPT | Performed by: NURSE PRACTITIONER

## 2023-03-16 PROCEDURE — 3008F BODY MASS INDEX DOCD: CPT | Performed by: NURSE PRACTITIONER

## 2023-03-16 PROCEDURE — 2014F MENTAL STATUS ASSESS: CPT | Performed by: NURSE PRACTITIONER

## 2023-03-16 PROCEDURE — 99395 PREV VISIT EST AGE 18-39: CPT | Performed by: NURSE PRACTITIONER

## 2023-03-16 PROCEDURE — 90471 IMMUNIZATION ADMIN: CPT | Performed by: NURSE PRACTITIONER

## 2023-03-16 RX ORDER — VARENICLINE TARTRATE 1 MG/1
1 TABLET, FILM COATED ORAL 2 TIMES DAILY
Qty: 56 TABLET | Refills: 4 | Status: SHIPPED | OUTPATIENT
Start: 2023-04-13

## 2023-03-16 NOTE — PROGRESS NOTES
Chief Complaint  Nicotine Dependence (Wants to start chantix ) and Annual Exam    Subjective            Sweetie Hernandez presents to [facility]  History of Present Illness     Annual physical exam    Last pap smear was in 2022. She had one in January and then another when she transferred OB care to [facility]. Her periods post-partum have returned. She states that they were fairly normal at first. But then she had one that was normal, then started spotting a few weeks later, then three weeks later she had a normal period. She has had a tubal with this last pregnancy. First day of LMP was 02/20/2023.    She has never had a mammogram. No known family history of breast cancer. No breast issues currently. She is not breast feeding.     She has had a colonoscopy in the past - when in 7th or 8th grade. She was having really bad stomach pain and vomiting, which is why they were doing that. No currently bowel issues.     She wants to stop smoking. She quit during her pregnancy, but states after birth the 'post-partum' got to her and she started back. She is smoking 1 PPD currently. She has stopped before with use of Chantix. She denies any post-partum depression. She has noticed her headaches returned after starting smoking again, and she has started coughing again. She wants to stop smoking by Monday of next week. She is going to work at [email or web address]. 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     PHQ-2 Total Score: 0      Past Medical History:   Diagnosis Date   • Anxiety    • Bipolar disorder ([facility])    • Depression     meds prior to pregnancy   • Factor V Leiden ([facility])    • Preeclampsia    • Seasonal allergies    • Trichimoniasis    • Urinary tract infection    • Urogenital trichomoniasis        Allergies   Allergen Reactions   • Hydrocodone Anaphylaxis   • Naproxen Swelling     Lip swelling        Past Surgical History:   Procedure  Laterality Date   • ADENOIDECTOMY     •  SECTION N/A 2018    Procedure:  SECTION PRIMARY;  Surgeon: Radha Noel MD;  Location: [facility] LABOR DELIVERY;  Service: Obstetrics/Gynecology   • COLONOSCOPY  approx     normal per pt    • EYE SURGERY     • TONSILLECTOMY     • UPPER GASTROINTESTINAL ENDOSCOPY  approx     reflux and scaring per pt         Social History     Tobacco Use   • Smoking status: Every Day     Packs/day: 1.00     Years: 0.00     Pack years: 0.00     Types: Cigarettes     Start date: 2022   • Smokeless tobacco: Never   • Tobacco comments:     evapes   Vaping Use   • Vaping Use: Every day   • Substances: Nicotine, Flavoring   • Devices: Disposable   Substance Use Topics   • Alcohol use: Yes     Alcohol/week: 3.0 standard drinks     Types: 3 Glasses of wine per week   • Drug use: No       Family History   Problem Relation Age of Onset   • Diabetes Mother    • Hypertension Mother    • Heart disease Mother    • Thyroid disease Sister    • Ovarian cancer Sister    • Heart disease Maternal Uncle    • Heart disease Maternal Grandmother    • Heart disease Maternal Grandfather    • Hypertension Maternal Grandfather    • Cancer Maternal Grandfather    • Colon cancer Cousin         Health Maintenance Due   Topic Date Due   • HPV VACCINES (1 - 2-dose series) Never done   • ANNUAL PHYSICAL  Never done   • COVID-19 Vaccine (4 - Booster) 10/21/2021   • INFLUENZA VACCINE  2022   • CHLAMYDIA SCREENING  2023        Current Outpatient Medications on File Prior to Visit   Medication Sig   • [DISCONTINUED] amoxicillin (AMOXIL) 875 MG tablet Take 1 tablet by mouth 2 (Two) Times a Day.   • [DISCONTINUED] aspirin (aspirin) 81 MG EC tablet Take 1 tablet by mouth Daily.   • [DISCONTINUED] busPIRone (BUSPAR) 5 MG tablet Take 1 tablet by mouth 2 (Two) Times a Day.   • [DISCONTINUED] fluticasone (Flonase) 50 MCG/ACT nasal spray 2 sprays into the nostril(s) as directed by provider  "Daily.   • [DISCONTINUED] hydrOXYzine (ATARAX) 25 MG tablet Take 1 tablet by mouth Every 6 (Six) Hours As Needed for Anxiety.   • [DISCONTINUED] loratadine (Claritin) 10 MG tablet Take 1 tablet by mouth Daily.   • [DISCONTINUED] Prenatal Vit-Fe Fumarate-FA (prenatal vitamin 27-0.8) 27-0.8 MG tablet tablet Take  by mouth Daily.     No current facility-administered medications on file prior to visit.       Immunization History   Administered Date(s) Administered   • COVID-19 (MODERNA) 1st, 2nd, 3rd Dose Only 07/28/2021, 08/09/2021, 08/26/2021   • Flu Vaccine Split Quad 10/27/2016   • FluLaval/Fluzone >6mos 10/27/2016   • Influenza, Unspecified 10/27/2016, 10/26/2018   • Tdap 08/01/2009, 05/30/2017, 07/12/2018, 06/21/2022       Review of Systems     Objective     /80   Pulse 92   Temp 97.2 °F (36.2 °C)   Ht 160 cm (63\")   Wt 78 kg (172 lb)   SpO2 99%   BMI 30.47 kg/m²       Physical Exam  Vitals reviewed.   Constitutional:       General: She is not in acute distress.     Appearance: She is well-developed. She is obese.   HENT:      Head: Normocephalic and atraumatic.      Right Ear: Tympanic membrane, ear canal and external ear normal. There is no impacted cerumen.      Left Ear: Tympanic membrane, ear canal and external ear normal. There is no impacted cerumen.      Nose: Nose normal.      Mouth/Throat:      Mouth: Mucous membranes are moist.      Pharynx: Oropharynx is clear. No oropharyngeal exudate or posterior oropharyngeal erythema.   Eyes:      General: No scleral icterus.        Right eye: No discharge.         Left eye: No discharge.      Extraocular Movements: Extraocular movements intact.      Conjunctiva/sclera: Conjunctivae normal.   Neck:      Thyroid: No thyroid mass, thyromegaly or thyroid tenderness.      Trachea: Trachea normal.   Cardiovascular:      Rate and Rhythm: Normal rate and regular rhythm.      Pulses: Normal pulses.      Heart sounds: No murmur heard.  Pulmonary:      Effort: " Pulmonary effort is normal. No respiratory distress.      Breath sounds: Normal breath sounds. No wheezing, rhonchi or rales.   Abdominal:      General: Bowel sounds are normal. There is no distension.      Palpations: Abdomen is soft. There is no mass.      Tenderness: There is no abdominal tenderness. There is no guarding or rebound.   Musculoskeletal:         General: Normal range of motion.      Cervical back: Normal range of motion and neck supple. No tenderness.      Right lower leg: No edema.      Left lower leg: No edema.   Lymphadenopathy:      Cervical: No cervical adenopathy.   Skin:     General: Skin is warm and dry.   Neurological:      Mental Status: She is alert and oriented to person, place, and time.   Psychiatric:         Mood and Affect: Mood and affect normal.         Behavior: Behavior normal.         Thought Content: Thought content normal.         Judgment: Judgment normal.         Result Review :     The following data was reviewed by: LINDA Barragan on 03/16/2023:    CBC    CBC 6/21/22   WBC 10.96   RBC 3.93 (A)   Hemoglobin 12.0   Hematocrit 36.5   MCV 92.9   MCH 30.5   MCHC 32.9   RDW 13.0   Platelets 220   (A) Abnormal value            IGP,CtNgTv,rfx Aptima HPV ASCU (01/17/2022 02:47)                Assessment and Plan      Diagnoses and all orders for this visit:    1. Annual physical exam (Primary)  -     CBC Auto Differential; Future  -     Comprehensive Metabolic Panel; Future  -     Lipid Panel; Future  -     TSH+Free T4; Future    2. Class 1 obesity without serious comorbidity with body mass index (BMI) of 30.0 to 30.9 in adult, unspecified obesity type  Comments:  Healthy diet - encouraged physical activity of 150 minutes weekly    3. Screening for chlamydial disease  -     Chlamydia trachomatis, Neisseria gonorrhoeae, PCR - , Urine, Random Void; Future    4. Need for influenza vaccination  -     FluLaval/Fluzone >6 mos (5730-5725)    5. Cigarette nicotine dependence  with other nicotine-induced disorder  -     Varenicline Tartrate, Starter, 0.5 MG X 11 & 1 MG X 42 tablet therapy pack; Take 0.5 mg by mouth Daily for 3 days, THEN 0.5 mg 2 (Two) Times a Day for 4 days, THEN 1 mg 2 (Two) Times a Day for 21 days.  Dispense: 53 each; Refill: 0  -     varenicline (CHANTIX) 1 MG tablet; Take 1 tablet by mouth 2 (Two) Times a Day.  Dispense: 56 tablet; Refill: 4    6. Encounter for smoking cessation counseling  -     Varenicline Tartrate, Starter, 0.5 MG X 11 & 1 MG X 42 tablet therapy pack; Take 0.5 mg by mouth Daily for 3 days, THEN 0.5 mg 2 (Two) Times a Day for 4 days, THEN 1 mg 2 (Two) Times a Day for 21 days.  Dispense: 53 each; Refill: 0  -     varenicline (CHANTIX) 1 MG tablet; Take 1 tablet by mouth 2 (Two) Times a Day.  Dispense: 56 tablet; Refill: 4            Follow Up     Return for follow up pending outcome of labs.     She will return to the office on Saturday for fasting labs.  Flu shot today.  We discussed healthy diet and increasing physical activity.  She has been steadily losing weight since the birth of her son.  Baseline weight prepregnancy 150 to 160 pounds.    Patient was given instructions and counseling regarding her condition or for health maintenance advice. Please see specific information pulled into the AVS if appropriate.     Sweetie Hernandez  reports that she has been smoking cigarettes. She started smoking about 6 months ago. She has been smoking an average of 1.00 packs per day. She has never used smokeless tobacco.. I have educated her on the risk of diseases from using tobacco products such as cancer, COPD and heart disease.     I advised her to quit and she is willing to quit. We have discussed the following method/s for tobacco cessation:  Prescription Medicaiton.  Together we have set a quit date for 03/20/2023.  She will follow up with me in 1 month or sooner to check on her progress.    I spent 3  minutes counseling the patient.

## 2023-05-12 ENCOUNTER — OFFICE VISIT (OUTPATIENT)
Dept: FAMILY MEDICINE CLINIC | Facility: CLINIC | Age: 23
End: 2023-05-12
Payer: MEDICAID

## 2023-05-12 VITALS
BODY MASS INDEX: 30.11 KG/M2 | WEIGHT: 170 LBS | HEART RATE: 102 BPM | SYSTOLIC BLOOD PRESSURE: 124 MMHG | TEMPERATURE: 96.4 F | OXYGEN SATURATION: 98 % | DIASTOLIC BLOOD PRESSURE: 84 MMHG

## 2023-05-12 DIAGNOSIS — R53.82 CHRONIC FATIGUE: Primary | ICD-10-CM

## 2023-05-12 DIAGNOSIS — Z11.8 SCREENING FOR CHLAMYDIAL DISEASE: ICD-10-CM

## 2023-05-12 DIAGNOSIS — N92.6 IRREGULAR MENSES: ICD-10-CM

## 2023-05-12 DIAGNOSIS — Z00.00 ANNUAL PHYSICAL EXAM: ICD-10-CM

## 2023-05-12 DIAGNOSIS — L85.3 DRY SKIN: ICD-10-CM

## 2023-05-12 DIAGNOSIS — F41.1 GENERALIZED ANXIETY DISORDER: ICD-10-CM

## 2023-05-12 DIAGNOSIS — F39 MOOD DISORDER: ICD-10-CM

## 2023-05-12 DIAGNOSIS — L65.9 HAIR LOSS: ICD-10-CM

## 2023-05-12 DIAGNOSIS — R68.89 COLD INTOLERANCE: ICD-10-CM

## 2023-05-12 LAB
25(OH)D3 SERPL-MCNC: 26.9 NG/ML (ref 30–100)
ALBUMIN SERPL-MCNC: 5 G/DL (ref 3.5–5.2)
ALBUMIN/GLOB SERPL: 1.9 G/DL
ALP SERPL-CCNC: 81 U/L (ref 39–117)
ALT SERPL W P-5'-P-CCNC: 27 U/L (ref 1–33)
ANION GAP SERPL CALCULATED.3IONS-SCNC: 11 MMOL/L (ref 5–15)
AST SERPL-CCNC: 21 U/L (ref 1–32)
BASOPHILS # BLD AUTO: 0.04 10*3/MM3 (ref 0–0.2)
BASOPHILS NFR BLD AUTO: 0.6 % (ref 0–1.5)
BILIRUB SERPL-MCNC: 0.4 MG/DL (ref 0–1.2)
BUN SERPL-MCNC: 14 MG/DL (ref 6–20)
BUN/CREAT SERPL: 18.9 (ref 7–25)
C TRACH RRNA CVX QL NAA+PROBE: NOT DETECTED
CALCIUM SPEC-SCNC: 9.9 MG/DL (ref 8.6–10.5)
CHLORIDE SERPL-SCNC: 106 MMOL/L (ref 98–107)
CHOLEST SERPL-MCNC: 161 MG/DL (ref 0–200)
CO2 SERPL-SCNC: 25 MMOL/L (ref 22–29)
CREAT SERPL-MCNC: 0.74 MG/DL (ref 0.57–1)
DEPRECATED RDW RBC AUTO: 39.5 FL (ref 37–54)
EGFRCR SERPLBLD CKD-EPI 2021: 116 ML/MIN/1.73
EOSINOPHIL # BLD AUTO: 0.17 10*3/MM3 (ref 0–0.4)
EOSINOPHIL NFR BLD AUTO: 2.7 % (ref 0.3–6.2)
ERYTHROCYTE [DISTWIDTH] IN BLOOD BY AUTOMATED COUNT: 12.5 % (ref 12.3–15.4)
FERRITIN SERPL-MCNC: 35.3 NG/ML (ref 13–150)
FOLATE SERPL-MCNC: 8.31 NG/ML (ref 4.78–24.2)
GLOBULIN UR ELPH-MCNC: 2.7 GM/DL
GLUCOSE SERPL-MCNC: 87 MG/DL (ref 65–99)
HCT VFR BLD AUTO: 42.7 % (ref 34–46.6)
HDLC SERPL-MCNC: 33 MG/DL (ref 40–60)
HGB BLD-MCNC: 14.3 G/DL (ref 12–15.9)
IMM GRANULOCYTES # BLD AUTO: 0.01 10*3/MM3 (ref 0–0.05)
IMM GRANULOCYTES NFR BLD AUTO: 0.2 % (ref 0–0.5)
IRON 24H UR-MRATE: 90 MCG/DL (ref 37–145)
IRON SATN MFR SERPL: 18 % (ref 20–50)
LDLC SERPL CALC-MCNC: 105 MG/DL (ref 0–100)
LDLC/HDLC SERPL: 3.12 {RATIO}
LYMPHOCYTES # BLD AUTO: 1.91 10*3/MM3 (ref 0.7–3.1)
LYMPHOCYTES NFR BLD AUTO: 30.3 % (ref 19.6–45.3)
MCH RBC QN AUTO: 29.3 PG (ref 26.6–33)
MCHC RBC AUTO-ENTMCNC: 33.5 G/DL (ref 31.5–35.7)
MCV RBC AUTO: 87.5 FL (ref 79–97)
MONOCYTES # BLD AUTO: 0.39 10*3/MM3 (ref 0.1–0.9)
MONOCYTES NFR BLD AUTO: 6.2 % (ref 5–12)
N GONORRHOEA RRNA SPEC QL NAA+PROBE: NOT DETECTED
NEUTROPHILS NFR BLD AUTO: 3.79 10*3/MM3 (ref 1.7–7)
NEUTROPHILS NFR BLD AUTO: 60 % (ref 42.7–76)
NRBC BLD AUTO-RTO: 0 /100 WBC (ref 0–0.2)
PLATELET # BLD AUTO: 233 10*3/MM3 (ref 140–450)
PMV BLD AUTO: 11.1 FL (ref 6–12)
POTASSIUM SERPL-SCNC: 4.1 MMOL/L (ref 3.5–5.2)
PROT SERPL-MCNC: 7.7 G/DL (ref 6–8.5)
RBC # BLD AUTO: 4.88 10*6/MM3 (ref 3.77–5.28)
SODIUM SERPL-SCNC: 142 MMOL/L (ref 136–145)
T4 FREE SERPL-MCNC: 1.14 NG/DL (ref 0.93–1.7)
TIBC SERPL-MCNC: 487 MCG/DL (ref 298–536)
TRANSFERRIN SERPL-MCNC: 327 MG/DL (ref 200–360)
TRIGL SERPL-MCNC: 125 MG/DL (ref 0–150)
TSH SERPL DL<=0.05 MIU/L-ACNC: 1.55 UIU/ML (ref 0.27–4.2)
VIT B12 BLD-MCNC: 588 PG/ML (ref 211–946)
VLDLC SERPL-MCNC: 23 MG/DL (ref 5–40)
WBC NRBC COR # BLD: 6.31 10*3/MM3 (ref 3.4–10.8)

## 2023-05-12 PROCEDURE — 85025 COMPLETE CBC W/AUTO DIFF WBC: CPT | Performed by: NURSE PRACTITIONER

## 2023-05-12 PROCEDURE — 84466 ASSAY OF TRANSFERRIN: CPT | Performed by: NURSE PRACTITIONER

## 2023-05-12 PROCEDURE — 87491 CHLMYD TRACH DNA AMP PROBE: CPT | Performed by: NURSE PRACTITIONER

## 2023-05-12 PROCEDURE — 80053 COMPREHEN METABOLIC PANEL: CPT | Performed by: NURSE PRACTITIONER

## 2023-05-12 PROCEDURE — 84439 ASSAY OF FREE THYROXINE: CPT | Performed by: NURSE PRACTITIONER

## 2023-05-12 PROCEDURE — 84443 ASSAY THYROID STIM HORMONE: CPT | Performed by: NURSE PRACTITIONER

## 2023-05-12 PROCEDURE — 82607 VITAMIN B-12: CPT | Performed by: NURSE PRACTITIONER

## 2023-05-12 PROCEDURE — 87591 N.GONORRHOEAE DNA AMP PROB: CPT | Performed by: NURSE PRACTITIONER

## 2023-05-12 PROCEDURE — 83540 ASSAY OF IRON: CPT | Performed by: NURSE PRACTITIONER

## 2023-05-12 PROCEDURE — 82746 ASSAY OF FOLIC ACID SERUM: CPT | Performed by: NURSE PRACTITIONER

## 2023-05-12 PROCEDURE — 82306 VITAMIN D 25 HYDROXY: CPT | Performed by: NURSE PRACTITIONER

## 2023-05-12 PROCEDURE — 80061 LIPID PANEL: CPT | Performed by: NURSE PRACTITIONER

## 2023-05-12 PROCEDURE — 82728 ASSAY OF FERRITIN: CPT | Performed by: NURSE PRACTITIONER

## 2023-05-12 NOTE — PROGRESS NOTES
Venipuncture Blood Specimen Collection  Venipuncture performed in left arm by Kyung Liao with good hemostasis. Patient tolerated the procedure well without complications.   05/12/23   Kyung Liao

## 2023-05-12 NOTE — PROGRESS NOTES
Chief Complaint  Fatigue (Wants labs checked )    Subjective            Sweetie Hernandez presents to [facility]  History of Present Illness     Sweetie presents to the office today to discuss fatigue - she had labs ordered in march but has not yet had those done - CBC, CMP, Lipid, TSH, and chlamydia screening.     She continues to endorse fatigue - she will sometimes notice a paleness to her lips - she has dry skin, gets really cold, and her periods have changed such that they are a week late now. She has had a tubal so she is not concerned with pregnancy.     She endorses mood swings and irritability, but states 'that is just my every day life'. She was going to psychiatry for her meds, and she took those for a while after the birth of her son, but she really wasn't feeling any better - was on olanzapine - and she feels it actually made her symptoms worse. She has a history of 'severe anxiety and borderline personality disorder'.     Past Medical History:   Diagnosis Date   • Anemia     Iron deficiency anemia   • Anxiety    • Bipolar disorder    • Depression     meds prior to pregnancy   • Factor V Leiden    • Headache    • Preeclampsia    • Seasonal allergies    • Trichimoniasis    • Urinary tract infection    • Urogenital trichomoniasis        Allergies   Allergen Reactions   • Hydrocodone Anaphylaxis   • Naproxen Swelling     Lip swelling        Past Surgical History:   Procedure Laterality Date   • ADENOIDECTOMY     •  SECTION N/A 2018    Procedure:  SECTION PRIMARY;  Surgeon: Radha Noel MD;  Location: [facility] LABOR DELIVERY;  Service: Obstetrics/Gynecology   • COLONOSCOPY  approx     normal per pt    • EYE SURGERY     • TONSILLECTOMY     • UPPER GASTROINTESTINAL ENDOSCOPY  approx     reflux and scaring per pt         Social History     Tobacco Use   • Smoking status: Former     Packs/day: 0.50     Years: 2.00     Pack years: 1.00     Types:  Cigarettes     Start date: 2022     Quit date: 2023     Years since quittin.1   • Smokeless tobacco: Never   • Tobacco comments:     Chantix didnt work, was messing with my mental health. I more or less quit cold turkey   Vaping Use   • Vaping Use: Every day   • Substances: Nicotine, Flavoring   • Devices: Disposable   Substance Use Topics   • Alcohol use: Yes     Alcohol/week: 3.0 standard drinks     Types: 3 Glasses of wine per week   • Drug use: No       Family History   Problem Relation Age of Onset   • Diabetes Mother    • Hypertension Mother    • Heart disease Mother    • Thyroid disease Sister    • Ovarian cancer Sister    • Heart disease Maternal Uncle    • Heart disease Maternal Grandmother    • Heart disease Maternal Grandfather    • Hypertension Maternal Grandfather    • Cancer Maternal Grandfather    • Colon cancer Cousin         Health Maintenance Due   Topic Date Due   • Pneumococcal Vaccine 0-64 (1 - PCV) Never done   • HPV VACCINES (1 - 2-dose series) Never done   • COVID-19 Vaccine (4 - Booster for Moderna series) 10/21/2021   • CHLAMYDIA SCREENING  2023        Current Outpatient Medications on File Prior to Visit   Medication Sig   • [DISCONTINUED] varenicline (CHANTIX) 1 MG tablet Take 1 tablet by mouth 2 (Two) Times a Day.     No current facility-administered medications on file prior to visit.       Immunization History   Administered Date(s) Administered   • COVID-19 (MODERNA) 1st,2nd,3rd Dose Monovalent 2021, 2021, 2021   • Flu Vaccine Split Quad 10/27/2016   • FluLaval/Fluzone >6mos 10/27/2016, 2023   • Influenza, Unspecified 10/27/2016, 10/26/2018   • Tdap 2009, 2017, 2018, 2022       Review of Systems     Objective     /84   Pulse 102   Temp 96.4 °F (35.8 °C)   Wt 77.1 kg (170 lb)   SpO2 98%   BMI 30.11 kg/m²       Physical Exam  Vitals reviewed.   Constitutional:       General: She is not in acute distress.      Appearance: She is well-developed. She is obese.   HENT:      Head: Normocephalic and atraumatic.   Eyes:      General: No scleral icterus.     Extraocular Movements: Extraocular movements intact.      Conjunctiva/sclera: Conjunctivae normal.   Neck:      Thyroid: No thyroid mass, thyromegaly or thyroid tenderness.      Trachea: Trachea normal.   Cardiovascular:      Rate and Rhythm: Normal rate and regular rhythm.      Pulses: Normal pulses.      Heart sounds: No murmur heard.  Pulmonary:      Effort: Pulmonary effort is normal. No respiratory distress.      Breath sounds: Normal breath sounds. No wheezing, rhonchi or rales.   Musculoskeletal:         General: Normal range of motion.      Cervical back: Normal range of motion and neck supple. No tenderness.      Right lower leg: No edema.      Left lower leg: No edema.   Lymphadenopathy:      Cervical: No cervical adenopathy.   Skin:     General: Skin is warm and dry.   Neurological:      Mental Status: She is alert and oriented to person, place, and time.   Psychiatric:         Mood and Affect: Mood and affect normal.         Behavior: Behavior normal.         Thought Content: Thought content normal.         Judgment: Judgment normal.         Result Review :     The following data was reviewed by: LINDA Barragan on 05/12/2023:    CBC        8/17/2022    01:05 9/8/2022    11:52 9/9/2022    05:18   CBC   WBC 12.02      10.11      10.42        RBC 3.82      4.01      3.60        Hemoglobin 10.8      10.9      9.9        Hematocrit 33.6      34.0      30.6        MCV 88.0      84.8      85.0        MCH 28.3      27.2      27.5        MCHC 32.1      32.1      32.4        RDW 13.2      14.6      14.4        Platelets 189      189      161            This result is from an external source.                   Assessment and Plan      Diagnoses and all orders for this visit:    1. Chronic fatigue (Primary)  -     Iron Profile  -     Ferritin  -     Vitamin B12 &  Folate  -     Vitamin D,25-Hydroxy    2. Cold intolerance  -     Iron Profile  -     Ferritin  -     Vitamin B12 & Folate  -     Vitamin D,25-Hydroxy    3. Hair loss  -     Iron Profile  -     Ferritin  -     Vitamin B12 & Folate  -     Vitamin D,25-Hydroxy    4. Dry skin  -     Iron Profile  -     Ferritin  -     Vitamin B12 & Folate  -     Vitamin D,25-Hydroxy    5. Irregular menses  -     Iron Profile  -     Ferritin  -     Vitamin B12 & Folate  -     Vitamin D,25-Hydroxy    6. Generalized anxiety disorder  -     Ambulatory Referral to Psychiatry    7. Mood disorder  -     Ambulatory Referral to Psychiatry            Follow Up     Return for follow up pending outcome of labs.     I will order labs today in addition to those ordered in March to further assess her complaints of fatigue, hair loss, cold intolerance, and irregular menses. Follow up pending outcome of labs.     She does feel like she needs to see someone for her mood - she does not want to return to [facility] or [facility] - she is agreeable to  Psychiatry.     Patient was given instructions and counseling regarding her condition or for health maintenance advice. Please see specific information pulled into the AVS if appropriate.

## 2023-05-15 ENCOUNTER — TELEPHONE (OUTPATIENT)
Dept: FAMILY MEDICINE CLINIC | Facility: CLINIC | Age: 23
End: 2023-05-15
Payer: MEDICAID

## 2023-05-15 DIAGNOSIS — E61.1 IRON DEFICIENCY: ICD-10-CM

## 2023-05-15 DIAGNOSIS — E55.9 VITAMIN D INSUFFICIENCY: Primary | ICD-10-CM

## 2023-05-15 RX ORDER — FERROUS SULFATE 325(65) MG
325 TABLET ORAL
Qty: 90 TABLET | Refills: 0 | Status: SHIPPED | OUTPATIENT
Start: 2023-05-15

## 2023-05-15 RX ORDER — CHOLECALCIFEROL (VITAMIN D3) 125 MCG
2000 CAPSULE ORAL DAILY
Qty: 90 TABLET | Refills: 0 | Status: SHIPPED | OUTPATIENT
Start: 2023-05-15

## 2023-06-19 ENCOUNTER — OFFICE VISIT (OUTPATIENT)
Dept: FAMILY MEDICINE CLINIC | Facility: CLINIC | Age: 23
End: 2023-06-19
Payer: MEDICAID

## 2023-06-19 VITALS
SYSTOLIC BLOOD PRESSURE: 120 MMHG | HEIGHT: 63 IN | BODY MASS INDEX: 30.46 KG/M2 | DIASTOLIC BLOOD PRESSURE: 68 MMHG | TEMPERATURE: 97.5 F | OXYGEN SATURATION: 99 % | WEIGHT: 171.9 LBS | HEART RATE: 70 BPM

## 2023-06-19 DIAGNOSIS — N93.9 ABNORMAL UTERINE BLEEDING: Primary | ICD-10-CM

## 2023-06-19 DIAGNOSIS — R10.2 PELVIC PAIN: ICD-10-CM

## 2023-06-19 LAB
B-HCG UR QL: NEGATIVE
EXPIRATION DATE: NORMAL
INTERNAL NEGATIVE CONTROL: NORMAL
INTERNAL POSITIVE CONTROL: NORMAL
Lab: NORMAL

## 2023-06-19 PROCEDURE — 99214 OFFICE O/P EST MOD 30 MIN: CPT | Performed by: FAMILY MEDICINE

## 2023-06-19 PROCEDURE — 81025 URINE PREGNANCY TEST: CPT | Performed by: FAMILY MEDICINE

## 2023-06-19 RX ORDER — LORAZEPAM 0.5 MG
1 TABLET ORAL DAILY
COMMUNITY
Start: 2023-05-15

## 2023-06-19 RX ORDER — PRENATAL WITH FERROUS FUM AND FOLIC ACID 3080; 920; 120; 400; 22; 1.84; 3; 20; 10; 1; 12; 200; 27; 25; 2 [IU]/1; [IU]/1; MG/1; [IU]/1; MG/1; MG/1; MG/1; MG/1; MG/1; MG/1; UG/1; MG/1; MG/1; MG/1; MG/1
1 TABLET ORAL DAILY
COMMUNITY
Start: 2023-06-12

## 2023-06-19 NOTE — PROGRESS NOTES
"Chief Complaint    Abdominal Pain (Abdominal pain/nausea x 2 month.  Heavier than normal periods with large clotting.  Bleeding and increased pain after sexual intercourse.)    Subjective      Sweetie Hernandez presents to [facility]    1.) ABDOMINAL PAIN : .  History of tubal ligation.  Most recent Pap test completed approximately 1 year ago.  Upcoming Pap test - November of this year.  Complaining of lower abdominal pain x 2 months.  Reports associated nausea.  Reports abnormal uterine bleeding.  Instances of blood clotting.  Also reports a history of pain with intercourse.     Objective     Vital Signs:     /68 (BP Location: Left arm, Patient Position: Sitting, Cuff Size: Adult)   Pulse 70   Temp 97.5 °F (36.4 °C) (Temporal)   Ht 160 cm (63\")   Wt 78 kg (171 lb 14.4 oz)   SpO2 99%   BMI 30.45 kg/m²       Physical Exam  Vitals reviewed.   Constitutional:       General: She is not in acute distress.     Appearance: Normal appearance. She is well-developed.   HENT:      Head: Normocephalic and atraumatic.      Right Ear: Hearing and external ear normal.      Left Ear: Hearing and external ear normal.      Nose: Nose normal.   Eyes:      General: Lids are normal.         Right eye: No discharge.         Left eye: No discharge.      Conjunctiva/sclera: Conjunctivae normal.   Pulmonary:      Effort: Pulmonary effort is normal.   Abdominal:          Comments: Tenderness w/ palpation of area noted above.   Musculoskeletal:         General: No swelling.      Cervical back: Neck supple.   Skin:     Coloration: Skin is not jaundiced.      Findings: No erythema.   Neurological:      Mental Status: She is alert. Mental status is at baseline.   Psychiatric:         Mood and Affect: Mood and affect normal.         Thought Content: Thought content normal.     Assessment and Plan     Diagnoses and all orders for this visit:    1. Abnormal uterine bleeding " (Primary)  Comments:  Negative urine pregnancy test. US pelvis ordered. Will await report. Will considere referral to gyn at that time.  Orders:  -     US Non-ob Transvaginal; Future  -     POCT pregnancy, urine    2. Pelvic pain  -     US Non-ob Transvaginal; Future  -     POCT pregnancy, urine      Follow Up     Return TBD per review of pelvic ultrasound.    Patient was given instructions and counseling regarding her condition or for health maintenance advice. Please see specific information pulled into the AVS if appropriate.

## 2023-07-24 ENCOUNTER — OFFICE VISIT (OUTPATIENT)
Dept: FAMILY MEDICINE CLINIC | Facility: CLINIC | Age: 23
End: 2023-07-24
Payer: MEDICAID

## 2023-07-24 VITALS
HEART RATE: 83 BPM | OXYGEN SATURATION: 99 % | SYSTOLIC BLOOD PRESSURE: 120 MMHG | DIASTOLIC BLOOD PRESSURE: 80 MMHG | TEMPERATURE: 96.8 F

## 2023-07-24 DIAGNOSIS — M54.41 CHRONIC MIDLINE LOW BACK PAIN WITH RIGHT-SIDED SCIATICA: Primary | ICD-10-CM

## 2023-07-24 DIAGNOSIS — G89.29 CHRONIC MIDLINE LOW BACK PAIN WITH RIGHT-SIDED SCIATICA: Primary | ICD-10-CM

## 2023-07-24 PROCEDURE — 99213 OFFICE O/P EST LOW 20 MIN: CPT | Performed by: NURSE PRACTITIONER

## 2023-07-24 PROCEDURE — 1159F MED LIST DOCD IN RCRD: CPT | Performed by: NURSE PRACTITIONER

## 2023-07-24 PROCEDURE — 1160F RVW MEDS BY RX/DR IN RCRD: CPT | Performed by: NURSE PRACTITIONER

## 2023-07-24 RX ORDER — ALBUTEROL SULFATE 90 UG/1
AEROSOL, METERED RESPIRATORY (INHALATION)
COMMUNITY

## 2023-07-24 RX ORDER — IBUPROFEN 600 MG/1
600 TABLET ORAL EVERY 8 HOURS PRN
Qty: 90 TABLET | Refills: 0 | Status: SHIPPED | OUTPATIENT
Start: 2023-07-24

## 2023-07-24 RX ORDER — TIZANIDINE 4 MG/1
4 TABLET ORAL EVERY 8 HOURS PRN
Qty: 30 TABLET | Refills: 0 | Status: SHIPPED | OUTPATIENT
Start: 2023-07-24

## 2023-07-24 NOTE — PROGRESS NOTES
Chief Complaint  Back Pain (Going down RT leg )    Subjective            Sweetie Hernandez presents to [facility]  History of Present Illness    Sweetie presents to the office today secondary to complaints of lower back pain.  She states that she has been having back pain for the past few months.  Back pain is described as being in the middle of the lower back and radiating down the right lower extremity.  She states it feels like a pulling sensation of the right lower extremity.  Sometimes she also experiences pain in the left buttock region, but it does not go down the left leg.  No loss of bowel or bladder.  No recent lower back trauma.  She states when she was a kid she fell out of trees and things like that, but otherwise denies any injury or trauma.  She has recently had a child.  This is her third.  She does not recall having any sciatic nerve pain with her first child, but notes that she did have leg pain with her second and third child.    Past Medical History:   Diagnosis Date    Anemia     Iron deficiency anemia    Anxiety     Bipolar disorder     Depression     meds prior to pregnancy    Factor V Leiden     Headache     Preeclampsia     Seasonal allergies     Trichimoniasis     Urinary tract infection     Urogenital trichomoniasis        Allergies   Allergen Reactions    Hydrocodone Anaphylaxis    Naproxen Swelling     Lip swelling        Past Surgical History:   Procedure Laterality Date    ADENOIDECTOMY       SECTION N/A 2018    Procedure:  SECTION PRIMARY;  Surgeon: Radha Noel MD;  Location: [facility] LABOR DELIVERY;  Service: Obstetrics/Gynecology    COLONOSCOPY  approx     normal per pt     EYE SURGERY      TONSILLECTOMY      UPPER GASTROINTESTINAL ENDOSCOPY  approx     reflux and scaring per pt         Social History     Tobacco Use    Smoking status: Former     Packs/day: 0.50     Years: 2.00     Pack years: 1.00     Types: Cigarettes      Start date: 2022     Quit date: 2023     Years since quittin.3    Smokeless tobacco: Never    Tobacco comments:     Chantix didnt work, was messing with my mental health. I more or less quit cold turkey   Vaping Use    Vaping Use: Every day    Substances: Nicotine, Flavoring    Devices: Disposable   Substance Use Topics    Alcohol use: Yes     Alcohol/week: 3.0 standard drinks     Types: 3 Glasses of wine per week    Drug use: No       Family History   Problem Relation Age of Onset    Diabetes Mother     Hypertension Mother     Heart disease Mother     Thyroid disease Sister     Ovarian cancer Sister     Heart disease Maternal Uncle     Heart disease Maternal Grandmother     Heart disease Maternal Grandfather     Hypertension Maternal Grandfather     Cancer Maternal Grandfather     Colon cancer Cousin         Health Maintenance Due   Topic Date Due    HPV VACCINES (1 - 2-dose series) Never done    COVID-19 Vaccine (4 - Moderna series) 10/21/2021        Current Outpatient Medications on File Prior to Visit   Medication Sig    albuterol sulfate  (90 Base) MCG/ACT inhaler     Cholecalciferol (Vitamin D3) 50 MCG (2000 UT) tablet Take 1 tablet by mouth Daily.     Ultra Strength 50 MCG (2000 UT) capsule Take 1 capsule by mouth Daily.    ferrous sulfate 325 (65 FE) MG tablet Take 1 tablet by mouth Daily With Breakfast.    Prenatal Vit-Fe Fumarate-FA (Prenatal 27-) 27-1 MG tablet tablet Take 1 tablet by mouth Daily.     No current facility-administered medications on file prior to visit.       Immunization History   Administered Date(s) Administered    COVID-19 (MODERNA) 1st,2nd,3rd Dose Monovalent 2021, 2021, 2021    Flu Vaccine Split Quad 10/27/2016    FluLaval/Fluzone >6mos 10/27/2016, 2023    Influenza, Unspecified 10/27/2016, 10/26/2018    Tdap 2009, 2017, 2018, 2022       Review of Systems     Objective     /80   Pulse 83   Temp 96.8  °F (36 °C)   SpO2 99%       Physical Exam  Vitals reviewed.   Constitutional:       General: She is not in acute distress.     Appearance: She is well-developed. She is obese.   HENT:      Head: Normocephalic and atraumatic.   Eyes:      General: No scleral icterus.     Extraocular Movements: Extraocular movements intact.      Conjunctiva/sclera: Conjunctivae normal.   Cardiovascular:      Rate and Rhythm: Normal rate and regular rhythm.      Pulses: Normal pulses.      Heart sounds: No murmur heard.  Pulmonary:      Effort: Pulmonary effort is normal. No respiratory distress.      Breath sounds: Normal breath sounds. No wheezing, rhonchi or rales.   Musculoskeletal:         General: Normal range of motion.      Cervical back: Normal range of motion.      Thoracic back: Normal range of motion. No scoliosis.      Lumbar back: Tenderness and bony tenderness present. Normal range of motion. No scoliosis.        Back:       Right lower leg: No edema.      Left lower leg: No edema.      Comments: Bony tenderness midline lower lumbar spine.  She is tender to palpation over the right and left buttock region indicated by arrows.  Range of motion seemingly unaffected.   Skin:     General: Skin is warm and dry.   Neurological:      Mental Status: She is alert and oriented to person, place, and time.   Psychiatric:         Mood and Affect: Mood and affect normal.         Behavior: Behavior normal.         Thought Content: Thought content normal.         Judgment: Judgment normal.       Result Review :     The following data was reviewed by: LINDA Barragan on 07/24/2023:    CMP          5/12/2023    10:40   CMP   Glucose 87    BUN 14    Creatinine 0.74    EGFR 116.0    Sodium 142    Potassium 4.1    Chloride 106    Calcium 9.9    Total Protein 7.7    Albumin 5.0    Globulin 2.7    Total Bilirubin 0.4    Alkaline Phosphatase 81    AST (SGOT) 21    ALT (SGPT) 27    Albumin/Globulin Ratio 1.9    BUN/Creatinine Ratio  18.9    Anion Gap 11.0      CBC          9/8/2022    11:52 9/9/2022    05:18 5/12/2023    10:40   CBC   WBC 10.11     10.42     6.31    RBC 4.01     3.60     4.88    Hemoglobin 10.9     9.9     14.3    Hematocrit 34.0     30.6     42.7    MCV 84.8     85.0     87.5    MCH 27.2     27.5     29.3    MCHC 32.1     32.4     33.5    RDW 14.6     14.4     12.5    Platelets 189     161     233       Details          This result is from an external source.             Lipid Panel          5/12/2023    10:40   Lipid Panel   Total Cholesterol 161    Triglycerides 125    HDL Cholesterol 33    VLDL Cholesterol 23    LDL Cholesterol  105    LDL/HDL Ratio 3.12      TSH          5/12/2023    10:40   TSH   TSH 1.550      Vitamin D,25-Hydroxy (05/12/2023 10:40)  Vitamin B12 & Folate (05/12/2023 10:40)  Ferritin (05/12/2023 10:40)  Iron Profile (05/12/2023 10:40)      Data reviewed : Radiologic studies :    XR Spine Lumbar 2 or 3 View (In Office) (11/12/2021 10:16)   XR Spine Lumbar 2 or 3 View (In Office) (07/24/2023 10:13)            Assessment and Plan      Diagnoses and all orders for this visit:    1. Chronic midline low back pain with right-sided sciatica (Primary)  -     XR Spine Lumbar 2 or 3 View (In Office)  -     XR Hip With or Without Pelvis 2 - 3 View Left  -     MRI Lumbar Spine Without Contrast; Future  -     tiZANidine (ZANAFLEX) 4 MG tablet; Take 1 tablet by mouth Every 8 (Eight) Hours As Needed for Muscle Spasms.  Dispense: 30 tablet; Refill: 0  -     ibuprofen (ADVIL,MOTRIN) 600 MG tablet; Take 1 tablet by mouth Every 8 (Eight) Hours As Needed for Moderate Pain.  Dispense: 90 tablet; Refill: 0            Follow Up     Return for follow up pending outcome of MRI.    X-ray of the lumbar spine does not appear to show any acute abnormal findings.  No significant degenerative disc disease appreciated, but I will confirm this with radiology interpretation and notify the patient if different.  In the interim I will  attempt to order MRI as she has been having lower back pain off and on with radiculopathy for the past few years.  I did offer her a course of PT but she declined.  I will prescribe ibuprofen and Zanaflex for symptom management in the interim.  She has swelling with naproxen but is able to tolerate ibuprofen.    Patient was given instructions and counseling regarding her condition or for health maintenance advice. Please see specific information pulled into the AVS if appropriate.     BMI is >= 30 and <35. (Class 1 Obesity). The following options were offered after discussion;: weight loss educational material (shared in after visit summary)

## 2023-09-07 DIAGNOSIS — G89.29 CHRONIC MIDLINE LOW BACK PAIN WITH RIGHT-SIDED SCIATICA: ICD-10-CM

## 2023-09-07 DIAGNOSIS — M54.41 CHRONIC MIDLINE LOW BACK PAIN WITH RIGHT-SIDED SCIATICA: ICD-10-CM

## 2023-09-07 RX ORDER — IBUPROFEN 600 MG/1
TABLET ORAL
Qty: 90 TABLET | Refills: 0 | Status: SHIPPED | OUTPATIENT
Start: 2023-09-07

## 2023-09-07 RX ORDER — TIZANIDINE 4 MG/1
TABLET ORAL
Qty: 30 TABLET | Refills: 0 | Status: SHIPPED | OUTPATIENT
Start: 2023-09-07

## 2023-10-10 ENCOUNTER — OFFICE VISIT (OUTPATIENT)
Dept: FAMILY MEDICINE CLINIC | Facility: CLINIC | Age: 23
End: 2023-10-10
Payer: MEDICAID

## 2023-10-10 VITALS
SYSTOLIC BLOOD PRESSURE: 118 MMHG | TEMPERATURE: 98.2 F | OXYGEN SATURATION: 98 % | BODY MASS INDEX: 32.24 KG/M2 | DIASTOLIC BLOOD PRESSURE: 78 MMHG | WEIGHT: 182 LBS | HEART RATE: 78 BPM

## 2023-10-10 DIAGNOSIS — Z71.85 IMMUNIZATION COUNSELING: ICD-10-CM

## 2023-10-10 DIAGNOSIS — G43.809 OTHER MIGRAINE WITHOUT STATUS MIGRAINOSUS, NOT INTRACTABLE: Primary | ICD-10-CM

## 2023-10-10 DIAGNOSIS — F17.200 VAPING NICOTINE DEPENDENCE, NON-TOBACCO PRODUCT: ICD-10-CM

## 2023-10-10 DIAGNOSIS — R53.83 OTHER FATIGUE: ICD-10-CM

## 2023-10-10 DIAGNOSIS — D50.8 OTHER IRON DEFICIENCY ANEMIA: ICD-10-CM

## 2023-10-10 LAB
BASOPHILS # BLD AUTO: 0.05 10*3/MM3 (ref 0–0.2)
BASOPHILS NFR BLD AUTO: 0.7 % (ref 0–1.5)
DEPRECATED RDW RBC AUTO: 40.4 FL (ref 37–54)
EOSINOPHIL # BLD AUTO: 0.19 10*3/MM3 (ref 0–0.4)
EOSINOPHIL NFR BLD AUTO: 2.5 % (ref 0.3–6.2)
ERYTHROCYTE [DISTWIDTH] IN BLOOD BY AUTOMATED COUNT: 12.5 % (ref 12.3–15.4)
FERRITIN SERPL-MCNC: 24.4 NG/ML (ref 13–150)
HCT VFR BLD AUTO: 41.3 % (ref 34–46.6)
HGB BLD-MCNC: 14 G/DL (ref 12–15.9)
IMM GRANULOCYTES # BLD AUTO: 0.03 10*3/MM3 (ref 0–0.05)
IMM GRANULOCYTES NFR BLD AUTO: 0.4 % (ref 0–0.5)
IRON 24H UR-MRATE: 55 MCG/DL (ref 37–145)
IRON SATN MFR SERPL: 11 % (ref 20–50)
LYMPHOCYTES # BLD AUTO: 2.09 10*3/MM3 (ref 0.7–3.1)
LYMPHOCYTES NFR BLD AUTO: 27.8 % (ref 19.6–45.3)
MCH RBC QN AUTO: 29.7 PG (ref 26.6–33)
MCHC RBC AUTO-ENTMCNC: 33.9 G/DL (ref 31.5–35.7)
MCV RBC AUTO: 87.5 FL (ref 79–97)
MONOCYTES # BLD AUTO: 0.59 10*3/MM3 (ref 0.1–0.9)
MONOCYTES NFR BLD AUTO: 7.8 % (ref 5–12)
NEUTROPHILS NFR BLD AUTO: 4.58 10*3/MM3 (ref 1.7–7)
NEUTROPHILS NFR BLD AUTO: 60.8 % (ref 42.7–76)
NRBC BLD AUTO-RTO: 0 /100 WBC (ref 0–0.2)
PLATELET # BLD AUTO: 221 10*3/MM3 (ref 140–450)
PMV BLD AUTO: 10.8 FL (ref 6–12)
RBC # BLD AUTO: 4.72 10*6/MM3 (ref 3.77–5.28)
TIBC SERPL-MCNC: 489 MCG/DL (ref 298–536)
TRANSFERRIN SERPL-MCNC: 328 MG/DL (ref 200–360)
WBC NRBC COR # BLD: 7.53 10*3/MM3 (ref 3.4–10.8)

## 2023-10-10 PROCEDURE — 83540 ASSAY OF IRON: CPT | Performed by: FAMILY MEDICINE

## 2023-10-10 PROCEDURE — 82728 ASSAY OF FERRITIN: CPT | Performed by: FAMILY MEDICINE

## 2023-10-10 PROCEDURE — 85025 COMPLETE CBC W/AUTO DIFF WBC: CPT | Performed by: FAMILY MEDICINE

## 2023-10-10 PROCEDURE — 84466 ASSAY OF TRANSFERRIN: CPT | Performed by: FAMILY MEDICINE

## 2023-10-10 RX ORDER — OLANZAPINE AND SAMIDORPHAN L-MALATE 5; 10 MG/1; MG/1
TABLET, FILM COATED ORAL
COMMUNITY
Start: 2023-09-08

## 2023-10-10 RX ORDER — TOPIRAMATE 25 MG/1
25 TABLET ORAL DAILY
Qty: 30 TABLET | Refills: 5 | Status: SHIPPED | OUTPATIENT
Start: 2023-10-10

## 2023-10-10 NOTE — PROGRESS NOTES
Chief Complaint  Anemia and Headache (Frequent headaches and check iron levels )    Subjective      History of Present Illness  Sweetie Hernandez is a 23 y.o. female who presents to [facility] with a past medical history of  Past Medical History:   Diagnosis Date    Anemia     Iron deficiency anemia    Anxiety     Bipolar disorder     Depression     meds prior to pregnancy    Factor V Leiden     Headache     Preeclampsia     Seasonal allergies     Trichimoniasis     Urinary tract infection     Urogenital trichomoniasis      History of anemia. Back in 2019 had to get iron infusions and had to see a hematologist. She has been feeling a little weak and cold/tired all the time so wants to get her labs checked. She is not on iron regularly.    Migraines. She has good days and bad days. When she gets a migraine she'll take ibuprofen, tylenol, midol, without much help. She'll usually get nauseous and throw up with the headaches. Gets the headaches here and there, sometimes it'll last a week where she has them daily, other times she'll be without headaches for a while. Usually when she gets them, she has a headache lasting all day that she either wakes up with or that starts throughout the day. Headache is more diffuse, not in any one location, feels like head is throbbing. These headache episodes that last a week typically occur every other week or so. She feels the headaches coming with a nausea aura. When she gets the headaches, she sometimes can't sleep at all. Bright lights and sounds bother it. Can sometimes still get through the day when these occur but not always. Been dealing with these headaches since she was a kid.     She had eye surgery in the first grade for a muscle problem. She tries to follow an eye doctor regularly and has an appointment with them in November. She has previously tried a medication a couple years ago that she only took when the headaches appear (possibly a  triptan), but it didn't help. She does not use tylenol or ibuprofen daily except for when she has these headaches.    She is not breastfeeding and has had a tubal ligation.    Objective   Vital Signs:   Vitals:    10/10/23 1442   BP: 118/78   Pulse: 78   Temp: 98.2 øF (36.8 øC)   SpO2: 98%   Weight: 82.6 kg (182 lb)       Wt Readings from Last 3 Encounters:   10/10/23 82.6 kg (182 lb)   06/19/23 78 kg (171 lb 14.4 oz)   05/12/23 77.1 kg (170 lb)     BP Readings from Last 3 Encounters:   10/10/23 118/78   07/24/23 120/80   06/19/23 120/68         Physical Exam  Vitals and nursing note reviewed.   Constitutional:       General: She is not in acute distress.     Appearance: Normal appearance.   HENT:      Head: Normocephalic.   Cardiovascular:      Rate and Rhythm: Normal rate.   Pulmonary:      Effort: Pulmonary effort is normal. No respiratory distress.   Skin:     General: Skin is warm and dry.   Neurological:      Mental Status: She is alert.   Psychiatric:         Mood and Affect: Mood normal.         Behavior: Behavior normal.            Result Review :  The following data was reviewed by: Brad Zamudio MD on 10/10/2023:  TSH+Free T4 (05/12/2023 10:40)  Lipid Panel (05/12/2023 10:40)  Comprehensive Metabolic Panel (05/12/2023 10:40)  CBC Auto Differential (05/12/2023 10:40)  Vitamin D,25-Hydroxy (05/12/2023 10:40)  Vitamin B12 & Folate (05/12/2023 10:40)  Ferritin (05/12/2023 10:40)  Iron Profile (05/12/2023 10:40)    Procedures        Assessment and Plan   Diagnoses and all orders for this visit:    1. Other migraine without status migrainosus, not intractable (Primary)  -     topiramate (TOPAMAX) 25 MG tablet; Take 1 tablet by mouth Daily.  Dispense: 30 tablet; Refill: 5    2. Other iron deficiency anemia  -     CBC & Differential  -     Iron Profile  -     Ferritin    3. Other fatigue  Comments:  checking anemia labs due to fatigue    4. Immunization counseling  -     Fluzone (or Fluarix & Flulaval for  VFC) >6 Mos ([id number])  -     HPV Vaccine (HPV9)    5. Vaping nicotine dependence, non-tobacco product  Comments:  Recently quit smoking but vapes currently. Congrated on quitting smoking and encourages to wean back and/or stop vaping.      Will try topiramate as a daily preventative medication for migraines. Based on her history, it certainly sounds like she has having migraines. She is normotensive. She reports some anxiety and suggests that may be contributing. There are no danger signs of headaches and she has had these same headaches since childhood. We're going to start with the topiramate - if no benefit we can refer to Neurology.     Health Maintenance Due   Topic Date Due    COVID-19 Vaccine (4 - 2023-24 season) 09/01/2023    HPV VACCINES (2 - 3-dose series) 11/07/2023     She got 1st HPV vaccine and flu vaccine today. Will return for next HPV dose.      FOLLOW UP  Return in about 1 month (around 11/10/2023).  Patient was given instructions and counseling regarding her condition or for health maintenance advice. Please see specific information pulled into the AVS if appropriate.       Brad Zamudio MD  10/10/23  15:22 EDT    CURRENT & DISCONTINUED MEDICATIONS  Current Outpatient Medications   Medication Instructions    ibuprofen (ADVIL,MOTRIN) 600 MG tablet TAKE 1 TABLET BY MOUTH EVERY 8 HOURS AS NEEDED FOR MODERATE PAIN    Lybalvi 5-10 MG tablet     tiZANidine (ZANAFLEX) 4 MG tablet TAKE 1 TABLET BY MOUTH EVERY 8 HOURS AS NEEDED FOR MUSCLE SPASMS    topiramate (TOPAMAX) 25 mg, Oral, Daily       Medications Discontinued During This Encounter   Medication Reason    Prenatal Vit-Fe Fumarate-FA (Prenatal 27-1) 27-1 MG tablet tablet *Therapy completed    albuterol sulfate  (90 Base) MCG/ACT inhaler *Therapy completed    Cholecalciferol (Vitamin D3) 50 MCG (2000 UT) tablet *Therapy completed     Ultra Strength 50 MCG (2000 UT) capsule *Therapy completed    ferrous sulfate 325 (65 FE) MG tablet  *Therapy completed

## 2023-10-10 NOTE — PROGRESS NOTES
..  Venipuncture Blood Specimen Collection  Venipuncture performed in left arm  by Luba Crow with good hemostasis. Patient tolerated the procedure well without complications.   10/10/23   Luba Crow

## 2023-10-21 DIAGNOSIS — M54.41 CHRONIC MIDLINE LOW BACK PAIN WITH RIGHT-SIDED SCIATICA: ICD-10-CM

## 2023-10-21 DIAGNOSIS — G89.29 CHRONIC MIDLINE LOW BACK PAIN WITH RIGHT-SIDED SCIATICA: ICD-10-CM

## 2023-10-23 RX ORDER — IBUPROFEN 600 MG/1
TABLET ORAL
Qty: 90 TABLET | Refills: 0 | OUTPATIENT
Start: 2023-10-23

## 2023-11-06 DIAGNOSIS — M54.41 CHRONIC MIDLINE LOW BACK PAIN WITH RIGHT-SIDED SCIATICA: ICD-10-CM

## 2023-11-06 DIAGNOSIS — G89.29 CHRONIC MIDLINE LOW BACK PAIN WITH RIGHT-SIDED SCIATICA: ICD-10-CM

## 2023-11-06 RX ORDER — IBUPROFEN 600 MG/1
TABLET ORAL
Qty: 90 TABLET | Refills: 0 | OUTPATIENT
Start: 2023-11-06

## 2023-11-20 ENCOUNTER — HOSPITAL ENCOUNTER (OUTPATIENT)
Dept: MRI IMAGING | Facility: HOSPITAL | Age: 23
Discharge: HOME | End: 2023-11-20
Admitting: NURSE PRACTITIONER
Payer: MEDICAID

## 2023-11-20 DIAGNOSIS — M54.41 CHRONIC MIDLINE LOW BACK PAIN WITH RIGHT-SIDED SCIATICA: ICD-10-CM

## 2023-11-20 DIAGNOSIS — G89.29 CHRONIC MIDLINE LOW BACK PAIN WITH RIGHT-SIDED SCIATICA: ICD-10-CM

## 2023-11-20 PROCEDURE — 72148 MRI LUMBAR SPINE W/O DYE: CPT

## 2023-11-21 ENCOUNTER — TELEPHONE (OUTPATIENT)
Dept: FAMILY MEDICINE CLINIC | Facility: CLINIC | Age: 23
End: 2023-11-21
Payer: MEDICAID

## 2023-11-21 DIAGNOSIS — M54.41 CHRONIC MIDLINE LOW BACK PAIN WITH RIGHT-SIDED SCIATICA: Primary | ICD-10-CM

## 2023-11-21 DIAGNOSIS — R93.89 ABNORMAL MRI: ICD-10-CM

## 2023-11-21 DIAGNOSIS — G89.29 CHRONIC MIDLINE LOW BACK PAIN WITH RIGHT-SIDED SCIATICA: Primary | ICD-10-CM

## 2023-11-21 NOTE — TELEPHONE ENCOUNTER
"Caller: Sweetie Hernandez \"MG\"    Relationship: Self    Best call back number: [phone]     What is the medical concern/diagnosis: LOWER BACK PAIN    What specialty or service is being requested: NEUROLOGY    Any additional details: PATIENT HAD AN MRI DONE YESTERDAY FOR LOWER BACK PAIN. IN THE NOTES IT STATED TO SEE A NEUROLOGIST OR PHYSICAL THERAPIST. SHE IS REQUESTING A REFERRAL FOR NEUROLOGY.  "

## 2023-12-19 ENCOUNTER — OFFICE VISIT (OUTPATIENT)
Dept: NEUROSURGERY | Facility: CLINIC | Age: 23
End: 2023-12-19
Payer: MEDICAID

## 2023-12-19 ENCOUNTER — PATIENT ROUNDING (BHMG ONLY) (OUTPATIENT)
Dept: NEUROSURGERY | Facility: CLINIC | Age: 23
End: 2023-12-19
Payer: MEDICAID

## 2023-12-19 VITALS
HEIGHT: 63 IN | WEIGHT: 199 LBS | BODY MASS INDEX: 35.26 KG/M2 | DIASTOLIC BLOOD PRESSURE: 78 MMHG | SYSTOLIC BLOOD PRESSURE: 111 MMHG | HEART RATE: 82 BPM

## 2023-12-19 DIAGNOSIS — M54.42 CHRONIC BILATERAL LOW BACK PAIN WITH BILATERAL SCIATICA: Primary | ICD-10-CM

## 2023-12-19 DIAGNOSIS — M51.36 DEGENERATIVE DISC DISEASE, LUMBAR: ICD-10-CM

## 2023-12-19 DIAGNOSIS — G89.29 CHRONIC BILATERAL LOW BACK PAIN WITH BILATERAL SCIATICA: Primary | ICD-10-CM

## 2023-12-19 DIAGNOSIS — M54.41 CHRONIC BILATERAL LOW BACK PAIN WITH BILATERAL SCIATICA: Primary | ICD-10-CM

## 2023-12-19 NOTE — PATIENT INSTRUCTIONS
-Diclofenac 50 mg three times daily, take with food, do not take with Ibuprofen  -Pain management for lumbar epidural injections  -Exercises at home  -Follow up as needed

## 2023-12-19 NOTE — PROGRESS NOTES
"Chief Complaint  Back Pain (Low back pain that radiates into bilateral legs stopping at knees)    Subjective          Sweetie Hernandez who is a 23 y.o. year old female who presents to [facility] for evaluation of lumbar spine.    The patient complains of pain located in the lumbar spine.  Patients states the pain has been present for several years.  The pain came on gradually.  Pain worsened after pregnancy. Pain is primarily in the lower lumbar spine. The pain scale level is 7-9/10.  The pain does radiate. Dermatomes are located bilaterally Lumbar at: typically to the knees, will radiate into the feet..  The pain is waxing/waning and described as sharp, aching, and throbbing.  The pain is worse at nighttime. Patient states prolonged standing, prolonged sitting, prolonged walking, and lifting makes the pain worse.  Patient states rest makes the pain better.    Associated Symptoms Include: Denies numbness and tingling  Conservative Interventions Include: NSAIDs that were initially effective, though no longer as effective as it once was. and Muscle Relaxants that were somewhat effective, though can only take at night as it makes her drowsy.    Was this the result of an injury or accident? : No    History of Previous Spinal Surgery?: No    Nicotine use: vape    BMI: Body mass index is 35.25 kg/m².      Review of Systems   Musculoskeletal:  Positive for arthralgias, back pain and myalgias.   All other systems reviewed and are negative.       Objective   Vital Signs:   /78 (BP Location: Left arm, Patient Position: Sitting, Cuff Size: Large Adult)   Pulse 82   Ht 160 cm (63\")   Wt 90.3 kg (199 lb)   BMI 35.25 kg/m²       Physical Exam  Vitals reviewed.   Constitutional:       Appearance: Normal appearance.   Musculoskeletal:      Lumbar back: No tenderness. Negative right straight leg raise test and negative left straight leg raise test.      Right hip: No tenderness. " Normal range of motion.      Left hip: No tenderness. Normal range of motion.   Neurological:      Mental Status: She is alert and oriented to person, place, and time.      Motor: Motor strength is normal.     Gait: Gait is intact.      Deep Tendon Reflexes:      Reflex Scores:       Patellar reflexes are 2+ on the right side and 2+ on the left side.       Achilles reflexes are 2+ on the right side and 2+ on the left side.       Neurologic Exam     Mental Status   Oriented to person, place, and time.   Level of consciousness: alert    Motor Exam   Muscle bulk: normal  Overall muscle tone: normal    Strength   Strength 5/5 throughout.     Sensory Exam   Light touch normal.     Gait, Coordination, and Reflexes     Gait  Gait: normal    Reflexes   Right patellar: 2+  Left patellar: 2+  Right achilles: 2+  Left achilles: 2+  Right ankle clonus: absent  Left ankle clonus: absent       Result Review :       Data reviewed : Radiologic studies MRI Lumbar Spine on 11/20/23 at [facility] personally reviewed. Mild degenerative changes in the lower lumbar spine, with mild facet arthritis. At L5/S1 there is mild to moderate right and mild left neural foraminal stenosis. At L4/5 there is mild bilateral foraminal stenosis. No spinal stenosis.            Assessment and Plan    Diagnoses and all orders for this visit:    1. Chronic bilateral low back pain with bilateral sciatica (Primary)  -     Ambulatory Referral to Pain Management  -     diclofenac (VOLTAREN) 50 MG EC tablet; Take 1 tablet by mouth 3 (Three) Times a Day.  Dispense: 90 tablet; Refill: 1    2. Degenerative disc disease, lumbar  -     Ambulatory Referral to Pain Management    Pt presenting for evaluation of chronic low back pain with sciatica. We reviewed her MRI Lumbar Spine, demonstrating mild degenerative changes most significant at L5/S1. There is some foraminal narrowing, though no significant spinal stenosis.     We discussed the importance of core strengthening,  avoidance of activities that worsen the pain, nicotine cessation and maintenance of healthy weight. Surgery for patients with multilevel degenerative disc disease is not typically successful with the risks outweighing the benefit.     Ibuprofen is no longer as effective as it once was. Will start Diclofenac 50 mg three times daily. She understands not to take this with her Ibuprofen. Will monitor for swelling and discontinue if this occurs.     I have provided stretches and exercises she can do at home. She is limited with transportation and cannot go to physical therapy at this time.     Will refer to pain management to evaluate for lumbar epidural steroid injections and medication management.     Follow up as needed.    I spent 40 minutes caring for Sweetie on this date of service. This time includes time spent by me in the following activities:preparing for the visit, reviewing tests, obtaining and/or reviewing a separately obtained history, performing a medically appropriate examination and/or evaluation , counseling and educating the patient/family/caregiver, ordering medications, tests, or procedures, referring and communicating with other health care professionals , documenting information in the medical record, and independently interpreting results and communicating that information with the patient/family/caregiver.    Follow Up   Return if symptoms worsen or fail to improve.  Patient was given instructions and counseling regarding her condition or for health maintenance advice.     -Diclofenac 50 mg three times daily, take with food, do not take with Ibuprofen  -Pain management for lumbar epidural injections  -Exercises at home  -Follow up as needed

## 2024-10-16 NOTE — TELEPHONE ENCOUNTER
Message forwarded to appt desk and RN's for orders.     ----- Message from Myra Pierre MD sent at 8/22/2019  8:26 AM EDT -----  Please inform the patient that her iron levels are low.  Please arrange for her to receive 2 doses of IV Injectafer with premeds.    Thank you    
7 (severe pain)

## 2025-02-25 ENCOUNTER — OFFICE VISIT (OUTPATIENT)
Dept: FAMILY MEDICINE CLINIC | Facility: CLINIC | Age: 25
End: 2025-02-25
Payer: MEDICAID

## 2025-02-25 VITALS
OXYGEN SATURATION: 97 % | DIASTOLIC BLOOD PRESSURE: 72 MMHG | TEMPERATURE: 98 F | SYSTOLIC BLOOD PRESSURE: 108 MMHG | HEIGHT: 63 IN | BODY MASS INDEX: 32.78 KG/M2 | WEIGHT: 185 LBS | HEART RATE: 67 BPM

## 2025-02-25 DIAGNOSIS — R68.83 CHILLS: ICD-10-CM

## 2025-02-25 DIAGNOSIS — R11.0 NAUSEA: ICD-10-CM

## 2025-02-25 DIAGNOSIS — B34.9 NONSPECIFIC SYNDROME SUGGESTIVE OF VIRAL ILLNESS: Primary | ICD-10-CM

## 2025-02-25 DIAGNOSIS — R51.9 NONINTRACTABLE HEADACHE, UNSPECIFIED CHRONICITY PATTERN, UNSPECIFIED HEADACHE TYPE: ICD-10-CM

## 2025-02-25 DIAGNOSIS — R11.10 VOMITING, UNSPECIFIED VOMITING TYPE, UNSPECIFIED WHETHER NAUSEA PRESENT: ICD-10-CM

## 2025-02-25 LAB
B PARAPERT DNA SPEC QL NAA+PROBE: NOT DETECTED
B PERT DNA SPEC QL NAA+PROBE: NOT DETECTED
C PNEUM DNA NPH QL NAA+NON-PROBE: NOT DETECTED
EXPIRATION DATE: NORMAL
FLUAV AG UPPER RESP QL IA.RAPID: NOT DETECTED
FLUAV SUBTYP SPEC NAA+PROBE: NOT DETECTED
FLUBV AG UPPER RESP QL IA.RAPID: NOT DETECTED
FLUBV RNA ISLT QL NAA+PROBE: NOT DETECTED
HADV DNA SPEC NAA+PROBE: NOT DETECTED
HCOV 229E RNA SPEC QL NAA+PROBE: NOT DETECTED
HCOV HKU1 RNA SPEC QL NAA+PROBE: NOT DETECTED
HCOV NL63 RNA SPEC QL NAA+PROBE: NOT DETECTED
HCOV OC43 RNA SPEC QL NAA+PROBE: NOT DETECTED
HMPV RNA NPH QL NAA+NON-PROBE: NOT DETECTED
HPIV1 RNA ISLT QL NAA+PROBE: NOT DETECTED
HPIV2 RNA SPEC QL NAA+PROBE: NOT DETECTED
HPIV3 RNA NPH QL NAA+PROBE: NOT DETECTED
HPIV4 P GENE NPH QL NAA+PROBE: NOT DETECTED
INTERNAL CONTROL: NORMAL
Lab: NORMAL
M PNEUMO IGG SER IA-ACNC: NOT DETECTED
RHINOVIRUS RNA SPEC NAA+PROBE: NOT DETECTED
RSV RNA NPH QL NAA+NON-PROBE: NOT DETECTED
SARS-COV-2 AG UPPER RESP QL IA.RAPID: NOT DETECTED
SARS-COV-2 RNA RESP QL NAA+PROBE: NOT DETECTED

## 2025-02-25 PROCEDURE — 87428 SARSCOV & INF VIR A&B AG IA: CPT

## 2025-02-25 PROCEDURE — 1125F AMNT PAIN NOTED PAIN PRSNT: CPT

## 2025-02-25 PROCEDURE — 1159F MED LIST DOCD IN RCRD: CPT

## 2025-02-25 PROCEDURE — 99213 OFFICE O/P EST LOW 20 MIN: CPT

## 2025-02-25 PROCEDURE — 1160F RVW MEDS BY RX/DR IN RCRD: CPT

## 2025-02-25 PROCEDURE — 0202U NFCT DS 22 TRGT SARS-COV-2: CPT

## 2025-02-25 PROCEDURE — 96372 THER/PROPH/DIAG INJ SC/IM: CPT

## 2025-02-25 RX ORDER — GABAPENTIN 300 MG/1
300 CAPSULE ORAL DAILY
COMMUNITY

## 2025-02-25 RX ORDER — ONDANSETRON 8 MG/1
8 TABLET, FILM COATED ORAL EVERY 8 HOURS PRN
Qty: 20 TABLET | Refills: 0 | Status: SHIPPED | OUTPATIENT
Start: 2025-02-25

## 2025-02-25 RX ORDER — OLANZAPINE AND SAMIDORPHAN L-MALATE 10; 10 MG/1; MG/1
1 TABLET, FILM COATED ORAL EVERY EVENING
COMMUNITY
Start: 2025-02-20

## 2025-02-25 RX ORDER — MULTIPLE VITAMINS W/ MINERALS TAB 9MG-400MCG
1 TAB ORAL DAILY
COMMUNITY

## 2025-02-25 RX ORDER — HYDROXYZINE HYDROCHLORIDE 10 MG/1
1 TABLET, FILM COATED ORAL 3 TIMES DAILY
COMMUNITY
Start: 2025-01-16

## 2025-02-25 RX ORDER — ERGOCALCIFEROL 1.25 MG/1
1 CAPSULE ORAL WEEKLY
COMMUNITY
Start: 2024-12-11 | End: 2025-02-25

## 2025-02-25 RX ORDER — KETOROLAC TROMETHAMINE 30 MG/ML
60 INJECTION, SOLUTION INTRAMUSCULAR; INTRAVENOUS ONCE
Status: COMPLETED | OUTPATIENT
Start: 2025-02-25 | End: 2025-02-25

## 2025-02-25 RX ADMIN — KETOROLAC TROMETHAMINE 60 MG: 30 INJECTION, SOLUTION INTRAMUSCULAR; INTRAVENOUS at 13:23

## 2025-02-25 NOTE — PROGRESS NOTES
..  Venipuncture Blood Specimen Collection  Venipuncture performed in left arm by Aida Oconnor with good hemostasis. Patient tolerated the procedure well without complications.   02/25/25   Aida Oconnor

## 2025-02-25 NOTE — PROGRESS NOTES
"Chief Complaint  Nausea (Vomiting, chills, flushed feeling, started yesterday )    Little interest or pleasure in doing things? Not at all   Feeling down, depressed, or hopeless? Not at all   PHQ-2 Total Score 0          History of Present Illness:  Sweetie Hernandez is a 24 y.o. female who presents to [facility] with a past medical history of  Past Medical History:   Diagnosis Date    Anemia     Iron deficiency anemia    Anxiety     Bipolar disorder     Depression     meds prior to pregnancy    Factor V Leiden     Headache     Preeclampsia     Seasonal allergies     Trichimoniasis     Urinary tract infection     Urogenital trichomoniasis         History of Present Illness  The patient is a 24-year-old female who presents today for chills and diarrhea.    She reports potential exposure to influenza at her workplace, with at least two confirmed cases among her colleagues. She began experiencing symptoms yesterday, including nausea, cold chills, hot flushes, and a headache localized to the temple region. She has not had a fever but did experience an episode of vomiting this morning. She also reports mild congestion. She is not currently breastfeeding, is not pregnant, and is menstruating. She was unable to attend work today due to her symptoms. Her medical history includes a tonsillectomy and adenoidectomy, with no subsequent episodes of strep throat.    ALLERGIES  The patient is allergic to NAPROXEN.    MEDICATIONS  Current: Voltaren      Objective   Vital Signs:   Vitals:    02/25/25 1238   BP: 108/72   Pulse: 67   Temp: 98 °F (36.7 °C)   SpO2: 97%   Weight: 83.9 kg (185 lb)   Height: 160 cm (63\")     Body mass index is 32.77 kg/m².    Wt Readings from Last 3 Encounters:   02/25/25 83.9 kg (185 lb)   12/19/23 90.3 kg (199 lb)   10/10/23 82.6 kg (182 lb)     BP Readings from Last 3 Encounters:   02/25/25 108/72   12/19/23 111/78   10/10/23 118/78       Health Maintenance   Topic Date " Due    ANNUAL PHYSICAL  03/16/2024    COVID-19 Vaccine (4 - 2024-25 season) 09/01/2024    PAP SMEAR  01/17/2025    HPV VACCINES (3 - 3-dose series) 01/28/2025    BMI FOLLOWUP  02/25/2026    TDAP/TD VACCINES (5 - Td or Tdap) 06/21/2032    HEPATITIS C SCREENING  Completed    INFLUENZA VACCINE  Completed    Pneumococcal Vaccine 0-49  Aged Out    CHLAMYDIA SCREENING  Discontinued       Review of Systems   Physical Exam  Vitals reviewed.   Constitutional:       Appearance: Normal appearance.   HENT:      Right Ear: Tympanic membrane normal.      Left Ear: Tympanic membrane normal.   Eyes:      Pupils: Pupils are equal, round, and reactive to light.   Cardiovascular:      Rate and Rhythm: Normal rate and regular rhythm.   Pulmonary:      Effort: Pulmonary effort is normal.      Breath sounds: Normal breath sounds.   Skin:     General: Skin is warm and dry.   Neurological:      General: No focal deficit present.      Mental Status: She is alert and oriented to person, place, and time.   Psychiatric:         Mood and Affect: Mood normal.            Result Review :  The following data was reviewed by: LINDA Knapp on 02/25/2025:  Office Visit on 02/25/2025   Component Date Value    SARS Antigen 02/25/2025 Not Detected     Influenza A Antigen [place] 02/25/2025 Not Detected     Influenza B Antigen [place] 02/25/2025 Not Detected     Internal Control 02/25/2025 Passed     Lot Number 02/25/2025 [id number]     Expiration Date 02/25/2025 7-        Results  Laboratory Studies  COVID-19 and influenza tests were negative.      Procedures        Assessment and Plan   Diagnoses and all orders for this visit:    1. Nonspecific syndrome suggestive of viral illness (Primary)    2. Nausea  -     POCT SARS-CoV-2 Antigen [place] + Flu  -     ondansetron (ZOFRAN) 8 MG tablet; Take 1 tablet by mouth Every 8 (Eight) Hours As Needed for Nausea or Vomiting.  Dispense: 20 tablet; Refill: 0    3. Nonintractable headache, unspecified chronicity  pattern, unspecified headache type  -     ketorolac (TORADOL) injection 60 mg    4. Vomiting, unspecified vomiting type, unspecified whether nausea present  -     POCT SARS-CoV-2 Antigen [place] + Flu  -     ondansetron (ZOFRAN) 8 MG tablet; Take 1 tablet by mouth Every 8 (Eight) Hours As Needed for Nausea or Vomiting.  Dispense: 20 tablet; Refill: 0    5. Chills  -     POCT SARS-CoV-2 Antigen [place] + Flu        Assessment & Plan  1. Potential viral infection.  Her symptoms, including chills, diarrhea, nausea, and headache, suggest a possible viral etiology. COVID-19 and influenza tests were negative, but it may be too early for accurate results. A send-out test for COVID-19 and influenza will be conducted, with results expected by this evening. A prescription for Zofran has been provided to manage her nausea. She has been advised to take Tylenol today and to alternate between Tylenol and ibuprofen starting tomorrow. A Toradol injection will be administered to alleviate her headache. She has been instructed to maintain adequate hydration and rest. A work note has been issued for today and tomorrow. If the test results confirm COVID-19 or influenza, the work note will be extended. If the test results are negative but she continues to feel unwell, additional days off may be necessary. If the test results confirm influenza, Tamiflu can be considered as a treatment option.    PROCEDURE  A Toradol injection was administered to alleviate her headache.      BMI is >= 30 and <35. (Class 1 Obesity). The following options were offered after discussion;: referral to primary care         FOLLOW UP  Return if symptoms worsen or fail to improve.    Patient was given instructions and counseling regarding her condition or for health maintenance advice. Please see specific information pulled into the AVS if appropriate.       Sade Denney, LINDA  02/25/25  15:40 EST    CURRENT & DISCONTINUED MEDICATIONS  Current Outpatient Medications    Medication Instructions    gabapentin (NEURONTIN) 300 mg, Daily    hydrOXYzine (ATARAX) 10 MG tablet 1 tablet, 3 times daily    Lybalvi 10-10 MG tablet 1 tablet, Every Evening    multivitamin with minerals tablet tablet 1 tablet, Daily    ondansetron (ZOFRAN) 8 mg, Oral, Every 8 Hours PRN       Medications Discontinued During This Encounter   Medication Reason    Lybalvi 5-10 MG tablet *Therapy completed    ibuprofen (ADVIL,MOTRIN) 600 MG tablet *Therapy completed    diclofenac (VOLTAREN) 50 MG EC tablet *Therapy completed    ergocalciferol (ERGOCALCIFEROL) 1.25 MG (89520 UT) capsule *Therapy completed    tiZANidine (ZANAFLEX) 4 MG tablet *Therapy completed        EMR Dragon/Transcription disclaimer:  Parts of this encounter note are electronic transcription/translation of spoken language to printed text.     Patient or patient representative verbalized consent for the use of Ambient Listening during the visit with  LINDA Knapp for chart documentation. 2/25/2025  13:08 EST

## 2025-03-17 ENCOUNTER — OFFICE VISIT (OUTPATIENT)
Dept: FAMILY MEDICINE CLINIC | Facility: CLINIC | Age: 25
End: 2025-03-17
Payer: MEDICAID

## 2025-03-17 VITALS
BODY MASS INDEX: 33.66 KG/M2 | OXYGEN SATURATION: 98 % | WEIGHT: 190 LBS | DIASTOLIC BLOOD PRESSURE: 84 MMHG | TEMPERATURE: 97.5 F | SYSTOLIC BLOOD PRESSURE: 130 MMHG | HEART RATE: 102 BPM | HEIGHT: 63 IN

## 2025-03-17 DIAGNOSIS — E55.9 VITAMIN D DEFICIENCY: ICD-10-CM

## 2025-03-17 DIAGNOSIS — E66.811 CLASS 1 OBESITY WITHOUT SERIOUS COMORBIDITY WITH BODY MASS INDEX (BMI) OF 33.0 TO 33.9 IN ADULT, UNSPECIFIED OBESITY TYPE: ICD-10-CM

## 2025-03-17 DIAGNOSIS — Z76.89 ENCOUNTER FOR WEIGHT MANAGEMENT: Primary | ICD-10-CM

## 2025-03-17 LAB — 25(OH)D3 SERPL-MCNC: 24.4 NG/ML (ref 30–100)

## 2025-03-17 PROCEDURE — 82306 VITAMIN D 25 HYDROXY: CPT | Performed by: NURSE PRACTITIONER

## 2025-03-17 NOTE — PROGRESS NOTES
Chief Complaint  Vitamin D Deficiency (Wants to check Vitamin D level, was low) and Weight Check (Discuss options for weight loss)      Symptoms are: recurrent.   Onset was 1 to 6 months.   Symptoms occur: daily.  Symptoms include: fatigue and nausea (intermittent, not persistent, maybe 2-3 times per week, in the mornings upon awakening).   Pertinent negative symptoms include no abdominal pain, no anorexia, no joint pain, no change in stool, no chest pain, no chills, no congestion, no cough, no diaphoresis, no fever, no headaches, no joint swelling, no myalgias, no neck pain, no numbness, no rash, no sore throat, no swollen glands, no dysuria, no vertigo, no visual change, no vomiting and no weakness.     Sweetie Hernandez is a 24 y.o. female who presents to [facility] with a past medical history of    Past Medical History:   Diagnosis Date    Anemia     Iron deficiency anemia    Anxiety     Bipolar disorder     Depression     meds prior to pregnancy    Factor V Leiden     Headache     Low back pain     Migraine     Preeclampsia     Seasonal allergies     Trichimoniasis     Urinary tract infection     Urogenital trichomoniasis        History of Present Illness  The patient is a 24-year-old female who presents to the office today to reestablish care. She had been going elsewhere but recently moved back to the area. She is here today for a weight check and vitamin D check.    She was previously under the care of Kayla Aranda, with her most recent consultation in December 2024, during which she was treated for GERD and vitamin D deficiency. She was prescribed omeprazole 40 mg for 4 weeks, followed by a reduction to 20 mg, and then a cessation of the medication. She experienced chronic nausea for a period of 3 to 4 months, which has since resolved. She initially sought medical attention for iron deficiency due to the recurrence of these symptoms, but her iron levels were found to be  "normal, while her vitamin D levels were low. She continues to experience intermittent nausea, approximately 2 to 3 times per week, typically in the mornings upon waking. She does not consume food late at night, with her last meal usually around 6:00 PM. She has been self-medicating with over-the-counter vitamin D supplements.    She occasionally skips meals and primarily consumes lunch and dinner at her workplace, which is a intermediate. Her diet consists of turkey ny pasta with low salt and other processed foods. When working early shifts, she consumes oatmeal for breakfast. She maintains a generally healthy diet and hydration, with the exception of a daily [employer] coffee in the morning, which contains artificial sweeteners. She is uncertain about her insurance coverage for weight loss medications.  She does not engage in routine exercise.    SOCIAL HISTORY  The patient vapes nicotine with flavor daily. She quit smoking in 2023.    MEDICATIONS  Current: omeprazole, vitamin D supplement          Objective   Vital Signs:   Vitals:    03/17/25 1402   BP: 130/84   Pulse: 102   Temp: 97.5 °F (36.4 °C)   SpO2: 98%   Weight: 86.2 kg (190 lb)   Height: 160 cm (63\")     Body mass index is 33.66 kg/m².    Wt Readings from Last 3 Encounters:   03/17/25 86.2 kg (190 lb)   02/25/25 83.9 kg (185 lb)   12/19/23 90.3 kg (199 lb)     BP Readings from Last 3 Encounters:   03/17/25 130/84   02/25/25 108/72   12/19/23 111/78       Health Maintenance   Topic Date Due    ANNUAL PHYSICAL  03/16/2024    PAP SMEAR  01/17/2025    HPV VACCINES (3 - 3-dose series) 01/28/2025    COVID-19 Vaccine (4 - 2024-25 season) 03/17/2026 (Originally 9/1/2024)    BMI FOLLOWUP  02/25/2026    TDAP/TD VACCINES (5 - Td or Tdap) 06/21/2032    HEPATITIS C SCREENING  Completed    INFLUENZA VACCINE  Completed    Pneumococcal Vaccine 0-49  Aged Out    CHLAMYDIA SCREENING  Discontinued       Physical Exam  Vitals reviewed.   Constitutional:       General: She is not " in acute distress.     Appearance: She is well-developed. She is obese. She is not ill-appearing.   HENT:      Head: Normocephalic and atraumatic.   Eyes:      General: No scleral icterus.        Right eye: No discharge.         Left eye: No discharge.      Extraocular Movements: Extraocular movements intact.      Conjunctiva/sclera: Conjunctivae normal.   Neck:      Trachea: Trachea normal.   Cardiovascular:      Rate and Rhythm: Normal rate and regular rhythm.      Pulses: Normal pulses.      Heart sounds: No murmur heard.  Pulmonary:      Effort: Pulmonary effort is normal.      Breath sounds: Normal breath sounds. No wheezing, rhonchi or rales.   Musculoskeletal:         General: Normal range of motion.      Cervical back: Normal range of motion and neck supple. No tenderness.      Right lower leg: No edema.      Left lower leg: No edema.   Lymphadenopathy:      Cervical: No cervical adenopathy.   Skin:     General: Skin is warm and dry.   Neurological:      Mental Status: She is alert and oriented to person, place, and time.   Psychiatric:         Mood and Affect: Mood and affect normal.         Behavior: Behavior normal.         Thought Content: Thought content normal.         Judgment: Judgment normal.          Result Review :  The following data was reviewed by: LINDA Barragan on 03/17/2025:    IRON PROFILE (11/05/2024 13:20)  VITAMIN B12 AND FOLATE (11/05/2024 13:20)  VITAMIN D,25-HYDROXY (11/05/2024 13:20)  COMPREHENSIVE METABOLIC PANEL (11/05/2024 13:20)  CBC AND DIFFERENTIAL (11/05/2024 13:20)  TSH (11/05/2024 13:20)    VITAMIN D,25-HYDROXY (12/10/2024 14:17)     Procedures        Assessment and Plan   Diagnoses and all orders for this visit:    1. Encounter for weight management (Primary)    2. Class 1 obesity without serious comorbidity with body mass index (BMI) of 33.0 to 33.9 in adult, unspecified obesity type    3. Vitamin D deficiency  -     Vitamin D,25-Hydroxy        Assessment &  Plan  1. Weight management.  Her weight has fluctuated over the past few years, with a maximum recorded weight of 199 in December 2023, a current weight of 190, and a minimum weight of 154 in August 2021. She has gained approximately 40 to 50 pounds since May 2021. She has been advised to contact her insurance provider to inquire about coverage for weight loss medications, dietitian services, and gym membership discounts. She has been encouraged to consume three balanced meals daily, with a focus on protein intake of 80 to 100 grams per day, fiber intake of 25 to 30 grams per day, and adequate hydration of at least 80 ounces of water per day. The use of the Zidisha nestor has been suggested for dietary tracking. She has also been advised to incorporate resistance training into her exercise regimen to promote lean muscle mass development. If her insurance covers weight loss medications, a prescription may be considered.    2. Vitamin D deficiency.  Her vitamin D level was previously measured at 21 in December 2024. A vitamin D level test will be ordered today. If the result indicates a deficiency, a prescription for vitamin D supplementation will be provided.                FOLLOW UP  Return for follow up pending outcome of labs.    Patient was given instructions and counseling regarding her condition or for health maintenance advice. Please see specific information pulled into the AVS if appropriate.       Adelina Ortiz, APRN  03/17/25  15:02 EDT    CURRENT & DISCONTINUED MEDICATIONS  Current Outpatient Medications   Medication Instructions    gabapentin (NEURONTIN) 300 mg, Daily    hydrOXYzine (ATARAX) 10 MG tablet 1 tablet, 3 times daily    Lybalvi 10-10 MG tablet 1 tablet, Every Evening    multivitamin with minerals tablet tablet 1 tablet, Daily    ondansetron (ZOFRAN) 8 mg, Oral, Every 8 Hours PRN    tiZANidine (ZANAFLEX) 4 MG tablet 1 tablet, Every 6 Hours       There are no discontinued medications.      Patient or patient representative verbalized consent for the use of Ambient Listening during the visit with  LINDA Barragan for chart documentation. 3/17/2025  14:21 EDT

## 2025-03-17 NOTE — PROGRESS NOTES
Venipuncture Blood Specimen Collection  Venipuncture performed in left arm by Raven Laurent with good hemostasis. Patient tolerated the procedure well without complications.   03/17/25   Raven Laurent

## 2025-03-18 ENCOUNTER — DOCUMENTATION (OUTPATIENT)
Dept: FAMILY MEDICINE CLINIC | Facility: CLINIC | Age: 25
End: 2025-03-18
Payer: MEDICAID

## 2025-03-18 DIAGNOSIS — E55.9 VITAMIN D DEFICIENCY: Primary | ICD-10-CM

## 2025-03-18 RX ORDER — ERGOCALCIFEROL 1.25 MG/1
50000 CAPSULE, LIQUID FILLED ORAL
Qty: 13 CAPSULE | Refills: 0 | Status: SHIPPED | OUTPATIENT
Start: 2025-03-18

## 2025-04-17 ENCOUNTER — OFFICE VISIT (OUTPATIENT)
Dept: FAMILY MEDICINE CLINIC | Facility: CLINIC | Age: 25
End: 2025-04-17
Payer: MEDICAID

## 2025-04-17 VITALS
WEIGHT: 187.1 LBS | HEART RATE: 69 BPM | TEMPERATURE: 98.4 F | OXYGEN SATURATION: 100 % | DIASTOLIC BLOOD PRESSURE: 80 MMHG | BODY MASS INDEX: 33.14 KG/M2 | SYSTOLIC BLOOD PRESSURE: 126 MMHG

## 2025-04-17 DIAGNOSIS — R11.0 NAUSEA: ICD-10-CM

## 2025-04-17 DIAGNOSIS — Z98.51 H/O TUBAL LIGATION: ICD-10-CM

## 2025-04-17 DIAGNOSIS — N91.2 AMENORRHEA: Primary | ICD-10-CM

## 2025-04-17 DIAGNOSIS — K21.9 GASTROESOPHAGEAL REFLUX DISEASE, UNSPECIFIED WHETHER ESOPHAGITIS PRESENT: ICD-10-CM

## 2025-04-17 DIAGNOSIS — D50.8 OTHER IRON DEFICIENCY ANEMIA: ICD-10-CM

## 2025-04-17 DIAGNOSIS — R10.9 ABDOMINAL PAIN, UNSPECIFIED ABDOMINAL LOCATION: ICD-10-CM

## 2025-04-17 LAB
ALBUMIN SERPL-MCNC: 4.1 G/DL (ref 3.5–5.2)
ALBUMIN/GLOB SERPL: 1.2 G/DL
ALP SERPL-CCNC: 78 U/L (ref 39–117)
ALT SERPL W P-5'-P-CCNC: 26 U/L (ref 1–33)
ANION GAP SERPL CALCULATED.3IONS-SCNC: 10 MMOL/L (ref 5–15)
AST SERPL-CCNC: 17 U/L (ref 1–32)
BILIRUB SERPL-MCNC: 0.4 MG/DL (ref 0–1.2)
BUN SERPL-MCNC: 13 MG/DL (ref 6–20)
BUN/CREAT SERPL: 15.3 (ref 7–25)
CALCIUM SPEC-SCNC: 9.9 MG/DL (ref 8.6–10.5)
CANDIDA SPECIES: NEGATIVE
CHLORIDE SERPL-SCNC: 105 MMOL/L (ref 98–107)
CO2 SERPL-SCNC: 25 MMOL/L (ref 22–29)
CREAT SERPL-MCNC: 0.85 MG/DL (ref 0.57–1)
DEPRECATED RDW RBC AUTO: 43 FL (ref 37–54)
EGFRCR SERPLBLD CKD-EPI 2021: 97 ML/MIN/1.73
ERYTHROCYTE [DISTWIDTH] IN BLOOD BY AUTOMATED COUNT: 13.2 % (ref 12.3–15.4)
FSH SERPL-ACNC: 4.98 MIU/ML
GARDNERELLA VAGINALIS: POSITIVE
GLOBULIN UR ELPH-MCNC: 3.3 GM/DL
GLUCOSE SERPL-MCNC: 76 MG/DL (ref 65–99)
HBA1C MFR BLD: 5.3 % (ref 4.8–5.6)
HCT VFR BLD AUTO: 44.1 % (ref 34–46.6)
HGB BLD-MCNC: 14.5 G/DL (ref 12–15.9)
IRON 24H UR-MRATE: 171 MCG/DL (ref 37–145)
IRON SATN MFR SERPL: 32 % (ref 20–50)
MCH RBC QN AUTO: 29.1 PG (ref 26.6–33)
MCHC RBC AUTO-ENTMCNC: 32.9 G/DL (ref 31.5–35.7)
MCV RBC AUTO: 88.4 FL (ref 79–97)
PLATELET # BLD AUTO: 243 10*3/MM3 (ref 140–450)
PMV BLD AUTO: 10.4 FL (ref 6–12)
POTASSIUM SERPL-SCNC: 4.2 MMOL/L (ref 3.5–5.2)
PROLACTIN SERPL-MCNC: 27.1 NG/ML (ref 4.79–23.3)
PROT SERPL-MCNC: 7.4 G/DL (ref 6–8.5)
RBC # BLD AUTO: 4.99 10*6/MM3 (ref 3.77–5.28)
SODIUM SERPL-SCNC: 140 MMOL/L (ref 136–145)
T VAGINALIS DNA VAG QL PROBE+SIG AMP: NEGATIVE
T4 FREE SERPL-MCNC: 1.12 NG/DL (ref 0.92–1.68)
TIBC SERPL-MCNC: 533 MCG/DL (ref 298–536)
TRANSFERRIN SERPL-MCNC: 358 MG/DL (ref 200–360)
TSH SERPL DL<=0.05 MIU/L-ACNC: 1.97 UIU/ML (ref 0.27–4.2)
WBC NRBC COR # BLD AUTO: 10.34 10*3/MM3 (ref 3.4–10.8)

## 2025-04-17 PROCEDURE — 83036 HEMOGLOBIN GLYCOSYLATED A1C: CPT

## 2025-04-17 PROCEDURE — 84146 ASSAY OF PROLACTIN: CPT

## 2025-04-17 PROCEDURE — 84466 ASSAY OF TRANSFERRIN: CPT

## 2025-04-17 PROCEDURE — 87480 CANDIDA DNA DIR PROBE: CPT

## 2025-04-17 PROCEDURE — 80053 COMPREHEN METABOLIC PANEL: CPT

## 2025-04-17 PROCEDURE — 83540 ASSAY OF IRON: CPT

## 2025-04-17 PROCEDURE — 83001 ASSAY OF GONADOTROPIN (FSH): CPT

## 2025-04-17 PROCEDURE — 84443 ASSAY THYROID STIM HORMONE: CPT

## 2025-04-17 PROCEDURE — 85027 COMPLETE CBC AUTOMATED: CPT

## 2025-04-17 PROCEDURE — 84439 ASSAY OF FREE THYROXINE: CPT

## 2025-04-17 PROCEDURE — 86376 MICROSOMAL ANTIBODY EACH: CPT

## 2025-04-17 PROCEDURE — 87510 GARDNER VAG DNA DIR PROBE: CPT

## 2025-04-17 PROCEDURE — 87660 TRICHOMONAS VAGIN DIR PROBE: CPT

## 2025-04-17 NOTE — PROGRESS NOTES
Chief Complaint  Menstrual Problem (Has not had period for x2 weeks, has heartburn and been nauseated ) and Nausea    Little interest or pleasure in doing things? Not at all   Feeling down, depressed, or hopeless? Not at all   PHQ-2 Total Score 0          History of Present Illness:  Sweetie Hernandez is a 24 y.o. female who presents to [facility] with a past medical history of  Past Medical History:   Diagnosis Date    Anemia     Iron deficiency anemia    Anxiety     Bipolar disorder     Depression     meds prior to pregnancy    Factor V Leiden     Headache     Low back pain     Migraine     Preeclampsia     Seasonal allergies     Trichimoniasis     Urinary tract infection     Urogenital trichomoniasis         History of Present Illness  The patient is a 24-year-old female who presents today with complaints of amenorrhea for 2 weeks.    She reports a delay in her menstrual cycle, which is typically irregular but has never been this late. Her last menstrual period was on 03/01/2025. She experiences cramping, similar to her premenstrual symptoms, but without the onset of menstruation. She has not been diagnosed with PCOS and is not currently using any form of birth control. She has no history of endometriosis. She recalls a consultation with Dr. Melgar regarding a potential cervical ablation due to heavy menstrual bleeding, but this procedure was not performed. She is currently under the care of Dr. Macario for obstetrics and gynecology. She reports no nipple discharge or hot flashes. She has no history of diabetes. She is sexually active and has undergone tubal ligation, ruling out the possibility of pregnancy. She reports no vaginal discharge, dysuria, or recent stressors. There have been no changes in her medication regimen or dietary habits. She has not previously been tested for bacterial vaginosis or yeast infections. She reports no acne or abnormal hair growth.    She also reports  experiencing nausea and heartburn. She is currently taking omeprazole for acid reflux, which provides relief. She has Zofran available for nausea as needed. She reports no diarrhea.    She has a history of iron deficiency anemia, which was resolved following iron infusions a few years ago. She has factor V Leiden. She has a history of sciatic nerve damage. She takes vitamin D supplements.    GYNECOLOGICAL HISTORY:  - Last Menstrual Period: 03/01/2025    FAMILY HISTORY  Her mother is borderline diabetic.      Objective   Vital Signs:   Vitals:    04/17/25 1300   BP: 126/80   BP Location: Left arm   Patient Position: Sitting   Cuff Size: Large Adult   Pulse: 69   Temp: 98.4 °F (36.9 °C)   TempSrc: Infrared   SpO2: 100%   Weight: 84.9 kg (187 lb 1.6 oz)     Body mass index is 33.14 kg/m².    Wt Readings from Last 3 Encounters:   04/17/25 84.9 kg (187 lb 1.6 oz)   03/17/25 86.2 kg (190 lb)   02/25/25 83.9 kg (185 lb)     BP Readings from Last 3 Encounters:   04/17/25 126/80   03/17/25 130/84   02/25/25 108/72       Health Maintenance   Topic Date Due    ANNUAL PHYSICAL  03/16/2024    PAP SMEAR  01/17/2025    HPV VACCINES (3 - 3-dose series) 01/28/2025    COVID-19 Vaccine (4 - 2024-25 season) 03/17/2026 (Originally 9/1/2024)    INFLUENZA VACCINE  07/01/2025    TDAP/TD VACCINES (5 - Td or Tdap) 06/21/2032    HEPATITIS C SCREENING  Completed    Pneumococcal Vaccine 0-49  Aged Out    CHLAMYDIA SCREENING  Discontinued       Review of Systems   Physical Exam  Vitals reviewed.   Constitutional:       Appearance: Normal appearance.   Eyes:      Pupils: Pupils are equal, round, and reactive to light.   Cardiovascular:      Rate and Rhythm: Normal rate and regular rhythm.   Pulmonary:      Effort: Pulmonary effort is normal.      Breath sounds: Normal breath sounds.   Skin:     General: Skin is warm and dry.   Neurological:      General: No focal deficit present.      Mental Status: She is alert and oriented to person, place, and  time.   Psychiatric:         Mood and Affect: Mood normal.            Result Review :  The following data was reviewed by: LINDA Knapp on 2025:  Office Visit on 2025   Component Date Value    Prolactin 2025 27.10 (H)     FSH 2025 4.98     WBC 2025 10.34     RBC 2025 4.99     Hemoglobin 2025 14.5     Hematocrit 2025 44.1     MCV 2025 88.4     MCH 2025 29.1     MCHC 2025 32.9     RDW 2025 13.2     RDW-SD 2025 43.0     MPV 2025 10.4     Platelets 2025 243     Free T4 2025 1.12     TSH 2025 1.970     Hemoglobin A1C 2025 5.30     Glucose 2025 76     BUN 2025 13     Creatinine 2025 0.85     Sodium 2025 140     Potassium 2025 4.2     Chloride 2025 105     CO2 2025 25.0     Calcium 2025 9.9     Total Protein 2025 7.4     Albumin 2025 4.1     ALT (SGPT) 2025 26     AST (SGOT) 2025 17     Alkaline Phosphatase 2025 78     Total Bilirubin 2025 0.4     Globulin 2025 3.3     A/G Ratio 2025 1.2     BUN/Creatinine Ratio 2025 15.3     Anion Gap 2025 10.0     eGFR 2025 97.0     Iron 2025 171 (H)     Iron Saturation (TSAT) 2025 32     Transferrin 2025 358     TIBC 2025 533     Thyroid Peroxidase Antib* 2025 15     GARDNERELLA VAGINALIS 2025 Positive (A)     TRICHOMONAS VAGINALIS 2025 Negative     CANDIDA SPECIES 2025 Negative        Results  Labs   - Urine test for hC2025, Negative      Procedures        Assessment and Plan   Diagnoses and all orders for this visit:    1. Amenorrhea (Primary)  -     US Non-ob Transvaginal; Future  -     Prolactin  -     Follicle Stimulating Hormone  -     CBC (No Diff)  -     T4, Free  -     TSH  -     Hemoglobin A1c  -     Comprehensive Metabolic Panel  -     Iron Profile  -     Thyroid Peroxidase Antibody  -      Gardnerella vaginalis, Trichomonas vaginalis, Candida albicans, DNA - Swab, Vagina  -     Ambulatory Referral to Obstetrics / Gynecology    2. Abdominal pain, unspecified abdominal location    3. Gastroesophageal reflux disease, unspecified whether esophagitis present    4. Nausea    5. H/O tubal ligation  -     Ambulatory Referral to Obstetrics / Gynecology    6. Other iron deficiency anemia  -     CBC (No Diff)  -     Iron Profile        Assessment & Plan  1. Secondary amenorrhea.  - The patient's urine test was negative for hCG.  - A vaginal swab will be conducted to rule out bacterial vaginosis or yeast infection.  - Blood work will be ordered to assess hormone levels, thyroid function, and iron levels. A transvaginal ultrasound will be scheduled to check for any uterine abnormalities.  - A referral to Seema Arias NP, will be made for further evaluation. If she experiences severe abdominal pain, abnormal bleeding, or chest pain, she should seek immediate medical attention at the emergency room.    2. Nausea and heartburn.  - The patient reports nausea and heartburn accompanying her amenorrhea.  - She is currently taking omeprazole for acid reflux, which seems to help.  - She has Zofran available if needed for nausea.  - No prescription for omeprazole is needed as she has sufficient over-the-counter supply.        Follow UP  Return for Follow up with PCP.    Patient was given instructions and counseling regarding her condition or for health maintenance advice. Please see specific information pulled into the AVS if appropriate.       LINDA Knapp  04/18/25  14:03 EDT    CURRENT & DISCONTINUED MEDICATIONS  Current Outpatient Medications   Medication Instructions    gabapentin (NEURONTIN) 300 mg, Daily    hydrOXYzine (ATARAX) 10 MG tablet 1 tablet, 3 times daily    Lybalvi 10-10 MG tablet 1 tablet, Every Evening    multivitamin with minerals tablet tablet 1 tablet, Daily    ondansetron (ZOFRAN) 8 mg, Oral,  Every 8 Hours PRN    tiZANidine (ZANAFLEX) 4 MG tablet 1 tablet, Every 6 Hours    vitamin D (ERGOCALCIFEROL) 50,000 Units, Oral, Every 7 Days       There are no discontinued medications.     EMR Dragon/Transcription disclaimer:  Parts of this encounter note are electronic transcription/translation of spoken language to printed text.     Patient or patient representative verbalized consent for the use of Ambient Listening during the visit with  LINDA Knapp for chart documentation. 4/18/2025  13:11 EDT

## 2025-04-18 LAB — THYROPEROXIDASE AB SERPL-ACNC: 15 IU/ML (ref 0–34)

## 2025-04-21 DIAGNOSIS — R79.89 ELEVATED PROLACTIN LEVEL: Primary | ICD-10-CM

## 2025-05-13 ENCOUNTER — HOSPITAL ENCOUNTER (OUTPATIENT)
Dept: MRI IMAGING | Facility: HOSPITAL | Age: 25
Discharge: HOME | End: 2025-05-13
Payer: MEDICAID

## 2025-05-13 ENCOUNTER — HOSPITAL ENCOUNTER (OUTPATIENT)
Dept: ULTRASOUND IMAGING | Facility: HOSPITAL | Age: 25
Discharge: HOME | End: 2025-05-13
Payer: MEDICAID

## 2025-05-13 DIAGNOSIS — N91.2 AMENORRHEA: ICD-10-CM

## 2025-05-13 DIAGNOSIS — R79.89 ELEVATED PROLACTIN LEVEL: ICD-10-CM

## 2025-05-13 PROCEDURE — 25510000001 GADOPICLENOL 0.5 MMOL/ML SOLUTION

## 2025-05-13 PROCEDURE — 76830 TRANSVAGINAL US NON-OB: CPT

## 2025-05-13 PROCEDURE — A9579 GAD-BASE MR CONTRAST NOS,1ML: HCPCS

## 2025-05-13 PROCEDURE — 70553 MRI BRAIN STEM W/O & W/DYE: CPT

## 2025-05-13 RX ADMIN — GADOPICLENOL 10 ML: 485.1 INJECTION INTRAVENOUS at 11:13

## 2025-05-20 ENCOUNTER — OFFICE VISIT (OUTPATIENT)
Dept: ENDOCRINOLOGY | Age: 25
End: 2025-05-20
Payer: MEDICAID

## 2025-05-20 VITALS
HEART RATE: 68 BPM | BODY MASS INDEX: 34.87 KG/M2 | HEIGHT: 63 IN | SYSTOLIC BLOOD PRESSURE: 136 MMHG | DIASTOLIC BLOOD PRESSURE: 72 MMHG | OXYGEN SATURATION: 98 % | WEIGHT: 196.8 LBS

## 2025-05-20 DIAGNOSIS — R79.89 PROLACTIN INCREASED: Primary | ICD-10-CM

## 2025-05-20 RX ORDER — PREDNISONE 20 MG/1
TABLET ORAL
COMMUNITY
Start: 2025-04-21

## 2025-05-20 RX ORDER — METHOCARBAMOL 500 MG/1
TABLET, FILM COATED ORAL
COMMUNITY
Start: 2025-04-21

## 2025-05-20 RX ORDER — OXYCODONE AND ACETAMINOPHEN 5; 325 MG/1; MG/1
1 TABLET ORAL EVERY 6 HOURS
COMMUNITY
Start: 2025-04-21

## 2025-05-20 NOTE — PROGRESS NOTES
Chief complaint   Chief Complaint   Patient presents with    Elevated prolactin leve    Amenorrhea          Subjective     History of Present Illness:      This is a 24 years old female I am seeing for high prolactin   referred by PCP     other medical issues   1- Bipolar   2- Anemia   3- Other   Pt was Dx with high prolactin in 2025   Is not on prolactin lowering medication now    had blood work done by PCP, 4/2025  TSH was 1.9, prolactin was 27   Last MRI of the pit was 4-2025 that showed 3 mm hypoenhancing mass along the left side of the pituitary gland on the first postcontrast sequence suspicious for pituitary microadenoma   pt states that she is on Lybalvi for bipolar has headaches, No visual changes, no breast secretions and periods are irregular and missed her last one and saw her PC and had blood work done and showed a high prolactin levels and no plans for pregnancy and had her tubes tied  , no history head and Neck radiation, No History of pituitary surgery, No family history of pituitary disease, No prior history of pituitary Dysfunction  and has No dysphagia, No dyspnea, No dysphonia, Appetite is OK     Latest Reference Range & Units 04/17/25 13:39   FSH mIU/mL 4.98   Prolactin 4.79 - 23.30 ng/mL 27.10 (H)   Hemoglobin A1C 4.80 - 5.60 % 5.30   TSH Baseline 0.270 - 4.200 uIU/mL 1.970   Free T4 0.92 - 1.68 ng/dL 1.12   Thyroid Peroxidase Antibody 0 - 34 IU/mL 15   (H): Data is abnormally high  Family History   Problem Relation Age of Onset    Diabetes Mother     Hypertension Mother     Heart disease Mother     Thyroid disease Sister     Ovarian cancer Sister     Heart disease Maternal Uncle     Heart disease Maternal Grandmother     Heart disease Maternal Grandfather     Hypertension Maternal Grandfather     Cancer Maternal Grandfather     Colon cancer Cousin     Mental illness Father      Social History     Socioeconomic History    Marital status:      Spouse name: Meliton    Years of education:  High school   Tobacco Use    Smoking status: Former     Current packs/day: 0.00     Average packs/day: 0.5 packs/day for 2.0 years (1.0 ttl pk-yrs)     Types: Cigarettes     Start date: 2022     Quit date: 2023     Years since quittin.1     Passive exposure: Past    Smokeless tobacco: Never    Tobacco comments:     Chantix didnt work, was messing with my mental health. I more or less quit cold turkey   Vaping Use    Vaping status: Every Day    Start date: 2023    Substances: Nicotine, Flavoring    Devices: Disposable   Substance and Sexual Activity    Alcohol use: Yes     Alcohol/week: 3.0 standard drinks of alcohol    Drug use: No    Sexual activity: Yes     Partners: Male     Birth control/protection: None, Tubal ligation     Past Medical History:   Diagnosis Date    Anemia     Iron deficiency anemia    Anxiety     Bipolar disorder     Depression     meds prior to pregnancy    Factor V Leiden     Headache     Low back pain     Migraine     Preeclampsia     Seasonal allergies     Trichimoniasis     Urinary tract infection     Urogenital trichomoniasis      Past Surgical History:   Procedure Laterality Date    ADENOIDECTOMY       SECTION N/A 2018    Procedure:  SECTION PRIMARY;  Surgeon: Radha Noel MD;  Location: [facility] LABOR DELIVERY;  Service: Obstetrics/Gynecology    COLONOSCOPY  approx     normal per pt     EYE SURGERY      TONSILLECTOMY      UPPER GASTROINTESTINAL ENDOSCOPY  approx 2010    reflux and scaring per pt        Current Outpatient Medications:     gabapentin (NEURONTIN) 300 MG capsule, Take 1 capsule by mouth Daily., Disp: , Rfl:     hydrOXYzine (ATARAX) 10 MG tablet, Take 1 tablet by mouth 3 times a day., Disp: , Rfl:     Lybalvi 10-10 MG tablet, Take 1 tablet by mouth Every Evening., Disp: , Rfl:     methocarbamol (ROBAXIN) 500 MG tablet, TAKE 1 TABLET BY MOUTH THREE TIMES DAILY FOR 7 DAYS AS NEEDED FOR PAIN, Disp: , Rfl:     multivitamin with  minerals tablet tablet, Take 1 tablet by mouth Daily., Disp: , Rfl:     oxyCODONE-acetaminophen (PERCOCET) 5-325 MG per tablet, Take 1 tablet by mouth Every 6 (Six) Hours., Disp: , Rfl:     predniSONE (DELTASONE) 20 MG tablet, TAKE 3 TABLETS BY MOUTH EVERY DAY ON DAYS 1-4 THEN 2 TABLETS EVERY DAY ON DAYS 5-8 THEN 1 TABLET EVERY DAY ON DAYS 9-12, Disp: , Rfl:     ondansetron (ZOFRAN) 8 MG tablet, Take 1 tablet by mouth Every 8 (Eight) Hours As Needed for Nausea or Vomiting. (Patient not taking: Reported on 5/20/2025), Disp: 20 tablet, Rfl: 0    tiZANidine (ZANAFLEX) 4 MG tablet, Take 1 tablet by mouth Every 6 (Six) Hours. (Patient not taking: Reported on 5/20/2025), Disp: , Rfl:   Hydrocodone and Naproxen    Objective   Vitals:    05/20/25 1312   BP: 136/72   Pulse: 68   SpO2: 98%          Physical Exam  Neurological:      General: No focal deficit present.      Mental Status: She is alert and oriented to person, place, and time.               Diagnoses and all orders for this visit:    1. Prolactin increased (Primary)  -     Prolactin  -     Prolactin  -     Insulin-like Growth Factor-1 (IGF-1) With Z Score         Assessment:   This is a 24 years old female I am seeing for high prolactin, referred by PCP     Is not on prolactin lowering medication now    Pt was Dx with high prolactin after she had blood work done by PCP, 4/2025    TSH was 1.9, prolactin was 27  Had MRI of the pit was 4-2025 that showed 3 mm hypoenhancing mass along the left side of the pituitary gland on the first postcontrast sequence suspicious for pituitary microadenoma and she is on Lybalvi for bipolar that can also raise prolactin levels     Plan:    1. No treatment is needed now as levels are only slightly high  Will consider Cabergoline if levels are higher   2. Labs today : Prolactin and Pregnancy test and IFG-1   3. We talked about pregnancy and high prolactin   4. Return in 3  months,   5. Was informed that she needs to see NS and will  repeat MRI in 6 months   But no suregry or radation is needed as it is showing a microadenoma   6. Labs reviewed with the Patient : TSH   7- I reviewed the notes from PCP   8- Needs to go back on PCP for all of the other sx she is having      I discussed with the patient/legal representative the risks and the benefits associated with the medications.  The patient/legal representative has been given the opportunity to ask questions.  Alternatives to the proposed treatment (s) were discussed, including the likely results of no treatment.  The patient/legal representative wishes to proceed.       Ed Zamudio MD   05/20/25  13:34 EDT

## 2025-05-23 LAB
IGF-I SERPL-MCNC: 230 NG/ML (ref 91–308)
IGF-I Z-SCORE SERPL: 0.7 S.D.
PROLACTIN SERPL-MCNC: 19.6 NG/ML (ref 4.8–33.4)

## (undated) DEVICE — KENDALL SCD EXPRESS SLEEVES, KNEE LENGTH, MEDIUM: Brand: KENDALL SCD

## (undated) DEVICE — SOL IRR H2O BTL 1000ML STRL

## (undated) DEVICE — SUT VIC 3/0 CTI 36IN J944H

## (undated) DEVICE — 3M™ STERI-STRIP™ REINFORCED ADHESIVE SKIN CLOSURES, R1547, 1/2 IN X 4 IN (12 MM X 100 MM), 6 STRIPS/ENVELOPE: Brand: 3M™ STERI-STRIP™

## (undated) DEVICE — GLV SURG SENSICARE MICRO PF LF 6.5 STRL

## (undated) DEVICE — GLV SURG SENSICARE MICRO PF LF 6 STRL

## (undated) DEVICE — SUT VIC 0 CT 36IN J958H

## (undated) DEVICE — SUT MNCRYL PLS ANTIB UD 4/0 PS2 18IN

## (undated) DEVICE — 3M™ STERI-STRIP™ COMPOUND BENZOIN TINCTURE 40 BAGS/CARTON 4 CARTONS/CASE C1544: Brand: 3M™ STERI-STRIP™

## (undated) DEVICE — 3M(TM) TEGADERM(TM) TRANSPARENT FILM DRESSING FRAME STYLE 1627: Brand: 3M™ TEGADERM™